# Patient Record
Sex: MALE | Race: WHITE | NOT HISPANIC OR LATINO | Employment: UNEMPLOYED | URBAN - METROPOLITAN AREA
[De-identification: names, ages, dates, MRNs, and addresses within clinical notes are randomized per-mention and may not be internally consistent; named-entity substitution may affect disease eponyms.]

---

## 2017-01-20 ENCOUNTER — ONCOLOGY VISIT (OUTPATIENT)
Dept: TRANSPLANT | Facility: CLINIC | Age: 37
End: 2017-01-20
Attending: INTERNAL MEDICINE

## 2017-01-20 ENCOUNTER — APPOINTMENT (OUTPATIENT)
Dept: LAB | Facility: CLINIC | Age: 37
End: 2017-01-20
Attending: INTERNAL MEDICINE

## 2017-01-20 VITALS
DIASTOLIC BLOOD PRESSURE: 77 MMHG | SYSTOLIC BLOOD PRESSURE: 141 MMHG | OXYGEN SATURATION: 97 % | TEMPERATURE: 98.3 F | HEART RATE: 89 BPM | BODY MASS INDEX: 33.21 KG/M2 | WEIGHT: 212.1 LBS

## 2017-01-20 DIAGNOSIS — N52.9 ERECTILE DYSFUNCTION, UNSPECIFIED ERECTILE DYSFUNCTION TYPE: ICD-10-CM

## 2017-01-20 DIAGNOSIS — R05.9 COUGH: ICD-10-CM

## 2017-01-20 DIAGNOSIS — J43.1 PANLOBULAR EMPHYSEMA (H): ICD-10-CM

## 2017-01-20 DIAGNOSIS — C92.10 CML (CHRONIC MYELOCYTIC LEUKEMIA) (H): ICD-10-CM

## 2017-01-20 DIAGNOSIS — R68.82 DIMINISHED LIBIDO: ICD-10-CM

## 2017-01-20 DIAGNOSIS — D89.813 GRAFT-VERSUS-HOST DISEASE, UNSPECIFIED (H): ICD-10-CM

## 2017-01-20 DIAGNOSIS — F41.9 ANXIETY: ICD-10-CM

## 2017-01-20 DIAGNOSIS — R12 HEARTBURN: ICD-10-CM

## 2017-01-20 DIAGNOSIS — Z94.81 STATUS POST ALLOGENEIC BONE MARROW TRANSPLANT (H): ICD-10-CM

## 2017-01-20 LAB
ALBUMIN SERPL-MCNC: 3.8 G/DL (ref 3.4–5)
ALP SERPL-CCNC: 156 U/L (ref 40–150)
ALT SERPL W P-5'-P-CCNC: 93 U/L (ref 0–70)
ANION GAP SERPL CALCULATED.3IONS-SCNC: 6 MMOL/L (ref 3–14)
AST SERPL W P-5'-P-CCNC: 52 U/L (ref 0–45)
BASOPHILS # BLD AUTO: 0 10E9/L (ref 0–0.2)
BASOPHILS NFR BLD AUTO: 0.5 %
BILIRUB SERPL-MCNC: 0.5 MG/DL (ref 0.2–1.3)
BUN SERPL-MCNC: 12 MG/DL (ref 7–30)
CALCIUM SERPL-MCNC: 8.9 MG/DL (ref 8.5–10.1)
CHLORIDE SERPL-SCNC: 108 MMOL/L (ref 94–109)
CO2 SERPL-SCNC: 25 MMOL/L (ref 20–32)
CREAT SERPL-MCNC: 0.98 MG/DL (ref 0.66–1.25)
DIFFERENTIAL METHOD BLD: NORMAL
EOSINOPHIL # BLD AUTO: 0.1 10E9/L (ref 0–0.7)
EOSINOPHIL NFR BLD AUTO: 1.4 %
ERYTHROCYTE [DISTWIDTH] IN BLOOD BY AUTOMATED COUNT: 12.9 % (ref 10–15)
GFR SERPL CREATININE-BSD FRML MDRD: 86 ML/MIN/1.7M2
GLUCOSE SERPL-MCNC: 109 MG/DL (ref 70–99)
HCT VFR BLD AUTO: 44.5 % (ref 40–53)
HGB BLD-MCNC: 14.9 G/DL (ref 13.3–17.7)
IMM GRANULOCYTES # BLD: 0 10E9/L (ref 0–0.4)
IMM GRANULOCYTES NFR BLD: 0.2 %
LYMPHOCYTES # BLD AUTO: 3.3 10E9/L (ref 0.8–5.3)
LYMPHOCYTES NFR BLD AUTO: 38.2 %
MCH RBC QN AUTO: 30 PG (ref 26.5–33)
MCHC RBC AUTO-ENTMCNC: 33.5 G/DL (ref 31.5–36.5)
MCV RBC AUTO: 90 FL (ref 78–100)
MONOCYTES # BLD AUTO: 0.9 10E9/L (ref 0–1.3)
MONOCYTES NFR BLD AUTO: 10 %
NEUTROPHILS # BLD AUTO: 4.3 10E9/L (ref 1.6–8.3)
NEUTROPHILS NFR BLD AUTO: 49.7 %
NRBC # BLD AUTO: 0 10*3/UL
NRBC BLD AUTO-RTO: 0 /100
PLATELET # BLD AUTO: 188 10E9/L (ref 150–450)
POTASSIUM SERPL-SCNC: 4.3 MMOL/L (ref 3.4–5.3)
PROT SERPL-MCNC: 7.1 G/DL (ref 6.8–8.8)
RBC # BLD AUTO: 4.96 10E12/L (ref 4.4–5.9)
SODIUM SERPL-SCNC: 139 MMOL/L (ref 133–144)
WBC # BLD AUTO: 8.6 10E9/L (ref 4–11)

## 2017-01-20 PROCEDURE — 80053 COMPREHEN METABOLIC PANEL: CPT | Performed by: INTERNAL MEDICINE

## 2017-01-20 PROCEDURE — 36415 COLL VENOUS BLD VENIPUNCTURE: CPT

## 2017-01-20 PROCEDURE — 81206 BCR/ABL1 GENE MAJOR BP: CPT | Performed by: INTERNAL MEDICINE

## 2017-01-20 PROCEDURE — 85025 COMPLETE CBC W/AUTO DIFF WBC: CPT | Performed by: INTERNAL MEDICINE

## 2017-01-20 PROCEDURE — 99212 OFFICE O/P EST SF 10 MIN: CPT | Mod: ZF

## 2017-01-20 NOTE — PROGRESS NOTES
BMT CLINIC PROGRESS NOTE   Caesar Richardson is a 34 year old male with CML status post allo HCT in distant past, and DLI in 6/13/2012.  CC:  Here for scheduled routine f/u.    HPI:  Came with parents but they stayed in waiting area. Doing well, but did not loose weight. Knees had improved with steroid injection but are progressively getting worse again. Unable to afford his medication. Working with  to try to re-establish his disability. Anxiety was well controlled but current legal and financial issues have been taking a toll. His brother and donor is now in a psychiatric institution for treatment or his psychiatric issues. No GI issues. Short of breath on exertion, from COPD and overweight. Still smoking. Unable to use inhalers as he cannot afford. Otherwise, no GI or  issues.   ROS:  10 point ROS negative except as noted above   Physical exam:/77 mmHg  Pulse 89  Temp(Src) 98.3  F (36.8  C) (Oral)  Wt 96.208 kg (212 lb 1.6 oz)  SpO2 97%  Gen:  Pleasant, engages in conversation and answers all questions directly.  A few areas of skin erythema, but no skin plaques of lichenoid changes.  HEENT: OP moist, no upper teeth.  No mucositis or lichenoid changes.  Pulm: lungs with coarse sounds on both bases and wheezing upper fields.   CVS: RRR, no m/r/g   ABD +BS, soft, nontender, obese  EXT: no edema, no rashes, no edema.   SKIN: No rash.  Decorative tattoo on back.new forehead firm lesions.   MSK:  Crepitation on R forearm when moving his thumb.   Neuro/Psych:  Usual affect, conversant, oriented, somewhat anxious     LABS:    Lab Results   Component Value Date    WBC 8.6 01/20/2017    ANEU 4.3 01/20/2017    HGB 14.9 01/20/2017    HCT 44.5 01/20/2017     01/20/2017     01/20/2017    POTASSIUM 4.3 01/20/2017    CHLORIDE 108 01/20/2017    CO2 25 01/20/2017    * 01/20/2017    BUN 12 01/20/2017    CR 0.98 01/20/2017    MAG 2.0 03/12/2014    INR 0.87 03/10/2014   AST       52    1/20/2017  ALT       93   1/20/2017  BILICONJ      0.0   3/10/2014   BILITOTAL      0.5   1/20/2017  ALBUMIN      3.8   1/20/2017  PROTTOTAL      7.1   1/20/2017   ALKPHOS      156   1/20/2017    Bcr-Abl pending    ASSESSMENT AND PLAN:   1. Hematology/Chronic myelogenous leukemia:  Blood counts stable; morphologic remission last Bcr-Abl 3 months ago was undetectable. Today's pending.   - S/P DLI on 6/13/12 for CML relapse. Achieved remission.    2. ID: afebrile.     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 Arthritis maybe GVHD but may do better with local steroid injection to avoid systemic effects, in particular emotional lability. Monitor liver tests each visit.     4. Anxiety:  anxiety partly controlled. Continues to deal with social security and insurance coverage issues. Working on his appeal.    5. FEN:  Still overweight. Reinforced that he needs to loose weight for his breathing, cholesterol, and joint issues.     6. GI: No c/o. Elevated LFTs    7. Pulmonary: shortness of breath on exertion worse since not able to buy medications.  Life-long smoker, and still smoking. Recommended stop smoking again. Encouraged to use inhalers whenever possible.    8. Skin: no rash today. No fibrotic changes.    9. Lipids: Cholesterol mildly elevated but triglycerides were high (400's) last visit. Encouraged to change his diet and minimize sugars and flour (i.e. past). Encouraged to loose weight as above    RTC in  3 months; sooner if needed.  Work on diet to loose weight and exercise.   We would hope he could loose weight 10-15 LBs in next 3 months again.  Stop smoking

## 2017-01-20 NOTE — NURSING NOTE
BMT Heme Malignancy Rooming Note    Caesar Richardson - 1/20/2017 11:30 AM     Chief Complaint   Patient presents with     Blood Draw     vs/labs by cma     RECHECK     Return: CML        /77 mmHg  Pulse 89  Temp(Src) 98.3  F (36.8  C) (Oral)  Wt 96.208 kg (212 lb 1.6 oz)  SpO2 97%     Medications reviewed: Yes    Labs drawn: No    Dressing changed: Not applicable     Medications given: No    Staff time:6    Additional information if applicable: n/a    TOMASZ PADRON CMA

## 2017-01-20 NOTE — NURSING NOTE
Chief Complaint   Patient presents with     Blood Draw     vs/labs by cma   See doc flowsheets for details.  YENNIFER Murrieta,ELISE

## 2017-01-20 NOTE — MR AVS SNAPSHOT
After Visit Summary   1/20/2017    Caesar Richardson    MRN: [id number]           Patient Information     Date Of Birth          1980        Visit Information        Provider Department      1/20/2017 11:30 AM Sebas Loen MD [facility] Blood and Marrow Transplant        Today's Diagnoses     Status post allogeneic bone marrow transplant (H)         CML (chronic myelocytic leukemia) (H)         Cleaf-araedq-nmfb disease, unspecified (H)         Diminished libido         Erectile dysfunction, unspecified erectile dysfunction type         Panlobular emphysema (H)         Anxiety         Cough         Heartburn               [facility] ([facility])  [address] [place] [address]  Phone: [phone]  Clinic Hours:   Monday-Friday:    8am to 4:30pm   Weekends and holidays:    8am to noon (in general)  If your fever is 100.5  or greater,   call the clinic.  After hours call the   hospital at [phone] or   [phone]. Ask for the BMT   fellow for pediatric or adult patients           Care Instructions    4/12/17 @ 10:30a        Follow-ups after your visit        Your next 10 appointments already scheduled     Apr 12, 2017 11:00 AM   Return with Sebas Leon MD   [facility] Blood and Marrow Transplant ([facility])    [address] [place] [id number] [phone]              Future tests that were ordered for you today     Open Standing Orders        Priority Remaining Interval Expires Ordered    Comprehensive metabolic panel Routine 1/1 6/20/2017 1/20/2017    CBC with platelets differential Routine 1/1 6/20/2017 1/20/2017    Lipid panel reflex to direct LDL Routine 1/1 6/20/2017 1/20/2017            Who to contact     If you have questions or need follow up information about today's clinic visit or your schedule please contact [facility] BLOOD AND MARROW TRANSPLANT directly at [phone].  Normal  or non-critical lab and imaging results will be communicated to you by [employer], letter or phone within 4 business days after the clinic has received the results. If you do not hear from us within 7 days, please contact the clinic through [employer] or phone. If you have a critical or abnormal lab result, we will notify you by phone as soon as possible.  Submit refill requests through [employer] or call your pharmacy and they will forward the refill request to us. Please allow 3 business days for your refill to be completed.          Additional Information About Your Visit        [employer] Information     [employer] gives you secure access to your electronic health record. If you see a primary care provider, you can also send messages to your care team and make appointments. If you have questions, please call your primary care clinic.  If you do not have a primary care provider, please call [phone] and they will assist you.        Care EveryWhere ID     This is your Care EveryWhere ID. This could be used by other organizations to access your [place] medical records  [id number]        Your Vitals Were     Pulse Temperature Pulse Oximetry             89 98.3  F (36.8  C) (Oral) 97%          Blood Pressure from Last 3 Encounters:   01/20/17 141/77   06/08/16 111/64   03/09/16 118/72    Weight from Last 3 Encounters:   01/20/17 96.208 kg (212 lb 1.6 oz)   09/14/16 96.163 kg (212 lb)   06/08/16 97.2 kg (214 lb 4.6 oz)              We Performed the Following     BCR ABL1 Major Breakpoint Quant p210     CBC with platelets differential     Comprehensive metabolic panel        Recent Review Flowsheet Data     BMT Recent Results Latest Ref Rng 6/3/2015 9/9/2015 12/9/2015 3/9/2016 6/8/2016 9/14/2016 1/20/2017    WBC 4.0 - 11.0 10e9/L 9.7 7.9 7.4 8.5 9.1 8.8 8.6    Hemoglobin 13.3 - 17.7 g/dL 14.9 13.4 13.2(L) 14.9 14.0 14.5 14.9    Platelet Count 150 - 450 10e9/L 208 229 200 240 219 225 188    Neutrophils (Absolute) 1.6 - 8.3  10e9/L 5.7 4.2 3.9 4.4 4.8 4.7 4.3    Sodium 133 - 144 mmol/L 136 141 143 139 139 140 139    Potassium 3.4 - 5.3 mmol/L 3.9 3.9 3.5 4.4 3.8 4.2 4.3    Chloride 94 - 109 mmol/L 106 109 110(H) 106 109 109 108    Glucose 70 - 99 mg/dL 87 90 76 83 81 88 109(H)    Urea Nitrogen 7 - 30 mg/dL 22 11 14 13 17 15 12    Creatinine 0.66 - 1.25 mg/dL 0.93 0.80 0.93 1.00 1.00 1.13 0.98    Calcium (Total) 8.5 - 10.1 mg/dL 9.0 8.8 8.1(L) 9.0 9.1 9.2 8.9    Protein (Total) 6.8 - 8.8 g/dL 7.5 6.8 6.4(L) 7.5 7.5 7.6 7.1    Albumin 3.4 - 5.0 g/dL 4.0 3.5 3.5 3.8 4.0 4.0 3.8    Alkaline Phosphatase 40 - 150 U/L 196(H) 179(H) 169(H) 174(H) 151(H) 181(H) 156(H)    AST 0 - 45 U/L 97(H) 59(H) 43 82(H) 49(H) 55(H) 52(H)    ALT 0 - 70 U/L 146(H) 129(H) 90(H) 178(H) 94(H) 93(H) 93(H)    MCV 78 - 100 fl 88 89 89 89 89 89 90               Primary Care Provider    None Specified       No primary provider on file.        Thank you!     Thank you for choosing [facility] BLOOD AND MARROW TRANSPLANT  for your care. Our goal is always to provide you with excellent care. Hearing back from our patients is one way we can continue to improve our services. Please take a few minutes to complete the written survey that you may receive in the mail after your visit with us. Thank you!             Your Updated Medication List - Protect others around you: Learn how to safely use, store and throw away your medicines at www.disposemymeds.org.          This list is accurate as of: 1/20/17 12:02 PM.  Always use your most recent med list.                   Brand Name Dispense Instructions for use    diclofenac 1 % Gel topical gel    VOLTAREN    2 Tube    Apply topically 4 times daily       fluticasone-salmeterol 250-50 MCG/DOSE diskus inhaler    ADVAIR    1 Inhaler    Inhale 1 puff into the lungs 2 times daily       LORazepam 1 MG tablet    ATIVAN    60 tablet    Take 1 tablet (1 mg) by mouth every 4 hours as needed for other (nausea, vomiting, anxiety)        omeprazole 40 MG capsule    priLOSEC    30 capsule    Take 1 capsule (40 mg) by mouth daily       tiotropium 18 MCG capsule    SPIRIVA HANDIHALER    30 capsule    Inhale contents of one capsule daily.       traZODone 150 MG tablet    DESYREL    90 tablet    Take 1 tablet (150 mg) by mouth At Bedtime

## 2017-01-23 LAB — COPATH REPORT: NORMAL

## 2017-04-12 ENCOUNTER — APPOINTMENT (OUTPATIENT)
Dept: LAB | Facility: CLINIC | Age: 37
End: 2017-04-12
Attending: INTERNAL MEDICINE

## 2017-04-12 ENCOUNTER — ONCOLOGY VISIT (OUTPATIENT)
Dept: TRANSPLANT | Facility: CLINIC | Age: 37
End: 2017-04-12
Attending: INTERNAL MEDICINE

## 2017-04-12 ENCOUNTER — TELEPHONE (OUTPATIENT)
Dept: TRANSPLANT | Facility: CLINIC | Age: 37
End: 2017-04-12

## 2017-04-12 VITALS
DIASTOLIC BLOOD PRESSURE: 78 MMHG | BODY MASS INDEX: 33.66 KG/M2 | WEIGHT: 214.9 LBS | RESPIRATION RATE: 20 BRPM | TEMPERATURE: 98.7 F | HEART RATE: 94 BPM | SYSTOLIC BLOOD PRESSURE: 130 MMHG

## 2017-04-12 DIAGNOSIS — J43.1 PANLOBULAR EMPHYSEMA (H): ICD-10-CM

## 2017-04-12 DIAGNOSIS — R05.9 COUGH: ICD-10-CM

## 2017-04-12 DIAGNOSIS — R68.82 DIMINISHED LIBIDO: ICD-10-CM

## 2017-04-12 DIAGNOSIS — D89.813 GRAFT-VERSUS-HOST DISEASE, UNSPECIFIED (H): ICD-10-CM

## 2017-04-12 DIAGNOSIS — R12 HEARTBURN: ICD-10-CM

## 2017-04-12 DIAGNOSIS — F41.9 ANXIETY: ICD-10-CM

## 2017-04-12 DIAGNOSIS — N52.9 ERECTILE DYSFUNCTION, UNSPECIFIED ERECTILE DYSFUNCTION TYPE: ICD-10-CM

## 2017-04-12 DIAGNOSIS — C92.10 CML (CHRONIC MYELOCYTIC LEUKEMIA) (H): ICD-10-CM

## 2017-04-12 DIAGNOSIS — Z94.81 STATUS POST ALLOGENEIC BONE MARROW TRANSPLANT (H): ICD-10-CM

## 2017-04-12 LAB
ALBUMIN SERPL-MCNC: 3.9 G/DL (ref 3.4–5)
ALP SERPL-CCNC: 140 U/L (ref 40–150)
ALT SERPL W P-5'-P-CCNC: 104 U/L (ref 0–70)
ANION GAP SERPL CALCULATED.3IONS-SCNC: 6 MMOL/L (ref 3–14)
AST SERPL W P-5'-P-CCNC: 56 U/L (ref 0–45)
BASOPHILS # BLD AUTO: 0 10E9/L (ref 0–0.2)
BASOPHILS NFR BLD AUTO: 0.5 %
BILIRUB SERPL-MCNC: 0.5 MG/DL (ref 0.2–1.3)
BUN SERPL-MCNC: 17 MG/DL (ref 7–30)
CALCIUM SERPL-MCNC: 8.8 MG/DL (ref 8.5–10.1)
CHLORIDE SERPL-SCNC: 108 MMOL/L (ref 94–109)
CHOLEST SERPL-MCNC: 210 MG/DL
CO2 SERPL-SCNC: 24 MMOL/L (ref 20–32)
CREAT SERPL-MCNC: 1.13 MG/DL (ref 0.66–1.25)
DIFFERENTIAL METHOD BLD: NORMAL
EOSINOPHIL # BLD AUTO: 0.1 10E9/L (ref 0–0.7)
EOSINOPHIL NFR BLD AUTO: 1.6 %
ERYTHROCYTE [DISTWIDTH] IN BLOOD BY AUTOMATED COUNT: 13.5 % (ref 10–15)
GFR SERPL CREATININE-BSD FRML MDRD: 73 ML/MIN/1.7M2
GLUCOSE SERPL-MCNC: 82 MG/DL (ref 70–99)
HCT VFR BLD AUTO: 43.2 % (ref 40–53)
HDLC SERPL-MCNC: 35 MG/DL
HGB BLD-MCNC: 14.2 G/DL (ref 13.3–17.7)
IMM GRANULOCYTES # BLD: 0 10E9/L (ref 0–0.4)
IMM GRANULOCYTES NFR BLD: 0.5 %
LDLC SERPL CALC-MCNC: 146 MG/DL
LYMPHOCYTES # BLD AUTO: 3.4 10E9/L (ref 0.8–5.3)
LYMPHOCYTES NFR BLD AUTO: 38.9 %
MCH RBC QN AUTO: 29.5 PG (ref 26.5–33)
MCHC RBC AUTO-ENTMCNC: 32.9 G/DL (ref 31.5–36.5)
MCV RBC AUTO: 90 FL (ref 78–100)
MONOCYTES # BLD AUTO: 0.9 10E9/L (ref 0–1.3)
MONOCYTES NFR BLD AUTO: 10.6 %
NEUTROPHILS # BLD AUTO: 4.2 10E9/L (ref 1.6–8.3)
NEUTROPHILS NFR BLD AUTO: 47.9 %
NONHDLC SERPL-MCNC: 175 MG/DL
NRBC # BLD AUTO: 0 10*3/UL
NRBC BLD AUTO-RTO: 0 /100
PLATELET # BLD AUTO: 209 10E9/L (ref 150–450)
POTASSIUM SERPL-SCNC: 4.3 MMOL/L (ref 3.4–5.3)
PROT SERPL-MCNC: 7.2 G/DL (ref 6.8–8.8)
RBC # BLD AUTO: 4.82 10E12/L (ref 4.4–5.9)
SODIUM SERPL-SCNC: 138 MMOL/L (ref 133–144)
TRIGL SERPL-MCNC: 144 MG/DL
WBC # BLD AUTO: 8.7 10E9/L (ref 4–11)

## 2017-04-12 PROCEDURE — 36415 COLL VENOUS BLD VENIPUNCTURE: CPT

## 2017-04-12 PROCEDURE — 85025 COMPLETE CBC W/AUTO DIFF WBC: CPT | Performed by: INTERNAL MEDICINE

## 2017-04-12 PROCEDURE — 80061 LIPID PANEL: CPT | Performed by: INTERNAL MEDICINE

## 2017-04-12 PROCEDURE — 99212 OFFICE O/P EST SF 10 MIN: CPT | Mod: ZF

## 2017-04-12 PROCEDURE — 80053 COMPREHEN METABOLIC PANEL: CPT | Performed by: INTERNAL MEDICINE

## 2017-04-12 NOTE — PATIENT INSTRUCTIONS
R/NICOLETTE Leon on 7/26/17 with [employer] [facility]

## 2017-04-12 NOTE — TELEPHONE ENCOUNTER
Clinical   Blood and Marrow Transplant Service    Received request from provider to speak with patient about his insurance situation - he currently has no health insurance and has not taken prescribed medications for 1-2 months. LOU also has been screened by [employer] as being functionally able to work and has a hearing in June re: this. He also needs to have an appointment with a GI MD about his liver functions and will require insurance for this appointment. Dr. Leon would like assistance with getting coverage for 3 medications - Cleocin Cream, Advair, and Spiriva. Will investigate these options.    Phone call to number listed for his cell - this is his Aunt's home in [place] (where he lives) and she indicated writer should call the number listed for his father. When LOU is in [place] for MD appointments he stays with his dad. Phone call to father's number and message left.  LOU does not have a cell phone - made this change in Demographics.    Will await return call from patient.     Will continue to follow for supportive counseling and assist with resources.     Neena GREY LICSW  4/12/2017

## 2017-04-12 NOTE — MR AVS SNAPSHOT
After Visit Summary   4/12/2017    Caesar Richardosn    MRN: [id number]           Patient Information     Date Of Birth          1980        Visit Information        Provider Department      4/12/2017 11:00 AM Sebas Leon MD [facility] Blood and Marrow Transplant        Today's Diagnoses     Status post allogeneic bone marrow transplant (H)        CML (chronic myelocytic leukemia) (H)        Axqsq-fshgha-xtgr disease, unspecified (H)              [facility] ([facility])  [address] [place] [address]  Phone: [phone]  Clinic Hours:   Monday-Friday:    8am to 4:30pm   Weekends and holidays:    8am to noon (in general)  If your fever is 100.5  or greater,   call the clinic.  After hours call the   hospital at [phone] or   [phone]. Ask for the BMT   fellow for pediatric or adult patients           Care Instructions    R/NICOLETTE Leon on 7/26/17 with labs @ 11a          Follow-ups after your visit        Future tests that were ordered for you today     Open Future Orders        Priority Expected Expires Ordered    CBC with platelets differential Routine 7/26/2017 7/26/2017 4/12/2017    Comprehensive metabolic panel Routine 7/26/2017 7/26/2017 4/12/2017    Lipid panel reflex to direct LDL Routine 7/26/2017 7/26/2017 4/12/2017    BCR ABL1 Major Breakpoint Quant p210 Routine 7/26/2017 7/26/2017 4/12/2017            Who to contact     If you have questions or need follow up information about today's clinic visit or your schedule please contact [facility] BLOOD AND MARROW TRANSPLANT directly at [phone].  Normal or non-critical lab and imaging results will be communicated to you by MyChart, letter or phone within 4 business days after the clinic has received the results. If you do not hear from us within 7 days, please contact the clinic through MyChart or phone. If you have a critical or abnormal lab result, we will notify you by phone as soon as  possible.  Submit refill requests through [employer] or call your pharmacy and they will forward the refill request to us. Please allow 3 business days for your refill to be completed.          Additional Information About Your Visit        [employer] Information     [employer] gives you secure access to your electronic health record. If you see a primary care provider, you can also send messages to your care team and make appointments. If you have questions, please call your primary care clinic.  If you do not have a primary care provider, please call [phone] and they will assist you.        Care EveryWhere ID     This is your Care EveryWhere ID. This could be used by other organizations to access your [place] medical records  [id number]        Your Vitals Were     Pulse Temperature Respirations BMI (Body Mass Index)          94 98.7  F (37.1  C) (Oral) 20 33.66 kg/m2         Blood Pressure from Last 3 Encounters:   04/12/17 130/78   01/20/17 141/77   06/08/16 111/64    Weight from Last 3 Encounters:   04/12/17 97.5 kg (214 lb 14.4 oz)   01/20/17 96.2 kg (212 lb 1.6 oz)   09/14/16 96.2 kg (212 lb)              We Performed the Following     CBC with platelets differential     Comprehensive metabolic panel     Lipid panel reflex to direct LDL        Recent Review Flowsheet Data     BMT Recent Results Latest Ref Rng & Units 9/9/2015 12/9/2015 3/9/2016 6/8/2016 9/14/2016 1/20/2017 4/12/2017    WBC 4.0 - 11.0 10e9/L 7.9 7.4 8.5 9.1 8.8 8.6 8.7    Hemoglobin 13.3 - 17.7 g/dL 13.4 13.2(L) 14.9 14.0 14.5 14.9 14.2    Platelet Count 150 - 450 10e9/L 229 200 240 219 225 188 209    Neutrophils (Absolute) 1.6 - 8.3 10e9/L 4.2 3.9 4.4 4.8 4.7 4.3 4.2    Sodium 133 - 144 mmol/L 141 143 139 139 140 139 -    Potassium 3.4 - 5.3 mmol/L 3.9 3.5 4.4 3.8 4.2 4.3 -    Chloride 94 - 109 mmol/L 109 110(H) 106 109 109 108 -    Glucose 70 - 99 mg/dL 90 76 83 81 88 109(H) -    Urea Nitrogen 7 - 30 mg/dL 11 14 13 17 15 12 -    Creatinine  0.66 - 1.25 mg/dL 0.80 0.93 1.00 1.00 1.13 0.98 -    Calcium (Total) 8.5 - 10.1 mg/dL 8.8 8.1(L) 9.0 9.1 9.2 8.9 -    Protein (Total) 6.8 - 8.8 g/dL 6.8 6.4(L) 7.5 7.5 7.6 7.1 -    Albumin 3.4 - 5.0 g/dL 3.5 3.5 3.8 4.0 4.0 3.8 -    Alkaline Phosphatase 40 - 150 U/L 179(H) 169(H) 174(H) 151(H) 181(H) 156(H) -    AST 0 - 45 U/L 59(H) 43 82(H) 49(H) 55(H) 52(H) -    ALT 0 - 70 U/L 129(H) 90(H) 178(H) 94(H) 93(H) 93(H) -    MCV 78 - 100 fl 89 89 89 89 89 90 90               Primary Care Provider    None Specified       No primary provider on file.        Thank you!     Thank you for choosing [facility] BLOOD AND MARROW TRANSPLANT  for your care. Our goal is always to provide you with excellent care. Hearing back from our patients is one way we can continue to improve our services. Please take a few minutes to complete the written survey that you may receive in the mail after your visit with us. Thank you!             Your Updated Medication List - Protect others around you: Learn how to safely use, store and throw away your medicines at www.disposemymeds.org.          This list is accurate as of: 4/12/17 11:21 AM.  Always use your most recent med list.                   Brand Name Dispense Instructions for use    diclofenac 1 % Gel topical gel    VOLTAREN    2 Tube    Apply topically 4 times daily       fluticasone-salmeterol 250-50 MCG/DOSE diskus inhaler    ADVAIR    1 Inhaler    Inhale 1 puff into the lungs 2 times daily       LORazepam 1 MG tablet    ATIVAN    60 tablet    Take 1 tablet (1 mg) by mouth every 4 hours as needed for other (nausea, vomiting, anxiety)       omeprazole 40 MG capsule    priLOSEC    30 capsule    Take 1 capsule (40 mg) by mouth daily       tiotropium 18 MCG capsule    SPIRIVA HANDIHALER    30 capsule    Inhale contents of one capsule daily.       traZODone 150 MG tablet    DESYREL    90 tablet    Take 1 tablet (150 mg) by mouth At Bedtime

## 2017-04-12 NOTE — NURSING NOTE
Chief Complaint   Patient presents with     Blood Draw     Pt with CML--here for labs and to see MD Lucie Van, MA

## 2017-04-12 NOTE — PROGRESS NOTES
BMT CLINIC PROGRESS NOTE   Caesar Richardson is a 34 year old male with CML status post allo HCT in distant past, and DLI in 6/13/2012.  CC:  Here for scheduled routine f/u.    HPI:  Came with parents. Very depressed with his psycho-social condition. No suicidal ideas or plan. Knees hurt. Still smokes some. Not taking any meds for over 4 weeks as he has no money. Relatives provide food where he lives. Knees had improved with steroid injection but are progressively getting worse again and he has no money to go back to Ortho. Working with  to try to re-establish his disability but unlikely to succeed. No new GI issues. Acneiform rash on his forehead. Short of breath on exertion, from COPD and overweight. Still smoking. Unable to use inhalers as he cannot afford. Otherwise, no GI or  issues.   ROS:  10 point ROS negative except as noted above   Physical exam:/78 (BP Location: Left arm, Patient Position: Left side, Cuff Size: Adult Regular)  Pulse 94  Temp 98.7  F (37.1  C) (Oral)  Resp 20  Wt 97.5 kg (214 lb 14.4 oz)  BMI 33.66 kg/m2  Gen:  A few areas of skin erythema, but no skin plaques of lichenoid changes. Has acneiform rash on his forehead. Spiders angiomas on his upper chest and back.   HEENT: OP moist, no upper teeth.  No mucositis or lichenoid changes.  Pulm: lungs with coarse sounds on both bases and wheezing upper fields.   CVS: RRR, no m/r/g   ABD +BS, soft, nontender, obese  EXT: no edema, no rashes, no edema.   SKIN: No rash.  Decorative tattoo on back.new forehead firm lesions.   MSK:  Crepitation on R forearm when moving his thumb.   Neuro/Psych:  Depressed affect, conversant, oriented, somewhat anxious     LABS:    Lab Results   Component Value Date    WBC 8.7 04/12/2017    ANEU 4.2 04/12/2017    HGB 14.2 04/12/2017    HCT 43.2 04/12/2017     04/12/2017     04/12/2017    POTASSIUM 4.3 04/12/2017    CHLORIDE 108 04/12/2017    CO2 24 04/12/2017    GLC 82 04/12/2017    BUN  17 04/12/2017    CR 1.13 04/12/2017    MAG 2.0 03/12/2014    INR 0.87 03/10/2014     Lab Results   Component Value Date    AST 56 04/12/2017     Lab Results   Component Value Date     04/12/2017     Lab Results   Component Value Date    BILICONJ 0.0 03/10/2014      Lab Results   Component Value Date    BILITOTAL 0.5 04/12/2017     Lab Results   Component Value Date    ALBUMIN 3.9 04/12/2017     Lab Results   Component Value Date    PROTTOTAL 7.2 04/12/2017      Lab Results   Component Value Date    ALKPHOS 140 04/12/2017       Bcr-Abl next visit    ASSESSMENT AND PLAN:   1. Hematology/Chronic myelogenous leukemia:  Blood counts stable; morphologic remission last Bcr-Abl 3 months ago was undetectable. Repeat in the summer  - S/P DLI on 6/13/12 for CML relapse. Achieved remission.    2. ID: afebrile.     3. 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 Spider angiomas could be liver disease but not enough enzyme change to support. Arthritis maybe GVHD but may do better with local steroid injection to avoid systemic effects, in particular emotional lability.     4. Anxiety/depression:  Worse depression and anxiety due to ongoing psycho-social issues. Continues to deal with social security and insurance coverage issues. Working on his appeal.     5. FEN:  Still overweight. Reinforced that he needs to loose weight for his breathing, cholesterol, and joint issues.     6. GI: No c/o. Mildly elevated LFTs    7. Pulmonary: shortness of breath on exertion worse since not able to buy medications.  Life-long smoker, and still smoking. Recommended stop smoking again. Encouraged to use inhalers whenever possible.    8. Skin: two new rashes today. Unable to afford creon for face rash. No fibrotic changes on huis skin. Spider angiomas on his chest and back (?)    9. Lipids: Cholesterol mildly elevated and triglycerides are now down. Encouraged to change his diet and  minimize sugars and flour (i.e. past). Encouraged to loose weight as above    RTC in  3-4 months; sooner if needed.  Work on diet to loose weight and exercise.   We would hope he could loose weight 10-15 LBs until next visit  Stop smoking

## 2017-04-12 NOTE — NURSING NOTE
Chief Complaint   Patient presents with     Blood Draw     Pt with CML--here for labs and to see MD     RECHECK     Post BMT for CML here for labs and provider       Nisha Cano,CMA April 12, 2017 10:56 AM  Medications and allergies reviewed. Face to face time 5 minutes.

## 2017-04-14 RX ORDER — TIOTROPIUM BROMIDE 18 UG/1
CAPSULE ORAL; RESPIRATORY (INHALATION)
Qty: 30 CAPSULE | Refills: 12 | Status: SHIPPED | OUTPATIENT
Start: 2017-04-14 | End: 2021-02-19

## 2017-04-14 RX ORDER — CLINDAMYCIN PHOSPHATE 10 UG/ML
LOTION TOPICAL 2 TIMES DAILY
Qty: 60 ML | Refills: 12 | Status: SHIPPED | OUTPATIENT
Start: 2017-04-14 | End: 2017-10-25

## 2017-06-13 ENCOUNTER — OFFICE VISIT (OUTPATIENT)
Dept: TRANSPLANT | Facility: CLINIC | Age: 37
End: 2017-06-13

## 2017-06-13 ENCOUNTER — DOCUMENTATION ONLY (OUTPATIENT)
Dept: TRANSPLANT | Facility: CLINIC | Age: 37
End: 2017-06-13

## 2017-06-13 DIAGNOSIS — Z94.81 STATUS POST ALLOGENEIC BONE MARROW TRANSPLANT (H): ICD-10-CM

## 2017-06-13 DIAGNOSIS — C92.10 CML (CHRONIC MYELOCYTIC LEUKEMIA) (H): Primary | ICD-10-CM

## 2017-06-13 DIAGNOSIS — D89.813 GRAFT-VERSUS-HOST DISEASE, UNSPECIFIED (H): ICD-10-CM

## 2017-06-13 NOTE — MR AVS SNAPSHOT
After Visit Summary   6/13/2017    Caesar Richardson    MRN: [id number]           Patient Information     Date Of Birth          1980        Visit Information        Provider Department      6/13/2017 10:43 AM Sebas Leon MD Blood and Marrow Transplant        Today's Diagnoses     CML (chronic myelocytic leukemia) (H)    -  1    Status post allogeneic bone marrow transplant (H)        Hteaz-zjovda-teox disease, unspecified (H)              [facility] ([facility] [place])  [address] [place] [id number]  Phone: [phone]  Clinic Hours:   Monday-Thursday:7am to 7pm   Friday: 7am to 5pm   Weekends and holidays:    8am to noon (in general)  If your fever is 100.5  or greater,   call the clinic.  After hours call the   hospital at [phone] or   [phone]. Ask for the BMT   fellow on-call            Follow-ups after your visit        Your next 10 appointments already scheduled     Jul 26, 2017  2:00 PM CDT   [facility] Draw with  [facility]   [facility] Draw ([facility])    [address] [place] [id number] [phone]            Jul 26, 2017  2:30 PM CDT   Return with Sebas Leon MD   [facility] Blood and Marrow Transplant ([facility])    [address] [place] [id number] [phone]              Who to contact     Please call your clinic at [phone] to:    Ask questions about your health    Make or cancel appointments    Discuss your medicines    Learn about your test results    Speak to your doctor   If you have compliments or concerns about an experience at your clinic, or if you wish to file a complaint, please contact [facility] Patient Relations at [phone] or email us at [email or web address]         Additional Information About Your Visit        MyChart Information     MyChart  gives you secure access to your electronic health record. If you see a primary care provider, you can also send messages to your care team and make appointments. If you have questions, please call your primary care clinic.  If you do not have a primary care provider, please call [phone] and they will assist you.      [employer] is an electronic gateway that provides easy, online access to your medical records. With [employer], you can request a clinic appointment, read your test results, renew a prescription or communicate with your care team.     To access your existing account, please contact your [facility] or call [phone] for assistance.        Care EveryWhere ID     This is your Care EveryWhere ID. This could be used by other organizations to access your [facility] medical records  [id number]         Blood Pressure from Last 3 Encounters:   04/12/17 130/78   01/20/17 141/77   06/08/16 111/64    Weight from Last 3 Encounters:   04/12/17 97.5 kg (214 lb 14.4 oz)   01/20/17 96.2 kg (212 lb 1.6 oz)   09/14/16 96.2 kg (212 lb)              Today, you had the following     No orders found for display       Recent Review Flowsheet Data     BMT Recent Results Latest Ref Rng & Units 9/9/2015 12/9/2015 3/9/2016 6/8/2016 9/14/2016 1/20/2017 4/12/2017    WBC 4.0 - 11.0 10e9/L 7.9 7.4 8.5 9.1 8.8 8.6 8.7    Hemoglobin 13.3 - 17.7 g/dL 13.4 13.2(L) 14.9 14.0 14.5 14.9 14.2    Platelet Count 150 - 450 10e9/L 229 200 240 219 225 188 209    Neutrophils (Absolute) 1.6 - 8.3 10e9/L 4.2 3.9 4.4 4.8 4.7 4.3 4.2    Sodium 133 - 144 mmol/L 141 143 139 139 140 139 138    Potassium 3.4 - 5.3 mmol/L 3.9 3.5 4.4 3.8 4.2 4.3 4.3    Chloride 94 - 109 mmol/L 109 110(H) 106 109 109 108 108    Glucose 70 - 99 mg/dL 90 76 83 81 88 109(H) 82    Urea Nitrogen 7 - 30 mg/dL 11 14 13 17 15 12 17    Creatinine 0.66 - 1.25 mg/dL 0.80 0.93 1.00 1.00 1.13 0.98 1.13    Calcium (Total) 8.5 - 10.1 mg/dL 8.8 8.1(L) 9.0  9.1 9.2 8.9 8.8    Protein (Total) 6.8 - 8.8 g/dL 6.8 6.4(L) 7.5 7.5 7.6 7.1 7.2    Albumin 3.4 - 5.0 g/dL 3.5 3.5 3.8 4.0 4.0 3.8 3.9    Alkaline Phosphatase 40 - 150 U/L 179(H) 169(H) 174(H) 151(H) 181(H) 156(H) 140    AST 0 - 45 U/L 59(H) 43 82(H) 49(H) 55(H) 52(H) 56(H)    ALT 0 - 70 U/L 129(H) 90(H) 178(H) 94(H) 93(H) 93(H) 104(H)    MCV 78 - 100 fl 89 89 89 89 89 90 90               Primary Care Provider    None Specified       No primary provider on file.        Thank you!     Thank you for choosing BLOOD AND MARROW TRANSPLANT  for your care. Our goal is always to provide you with excellent care. Hearing back from our patients is one way we can continue to improve our services. Please take a few minutes to complete the written survey that you may receive in the mail after your visit with us. Thank you!             Your Updated Medication List - Protect others around you: Learn how to safely use, store and throw away your medicines at www.disposemymeds.org.          This list is accurate as of: 6/13/17 11:59 PM.  Always use your most recent med list.                   Brand Name Dispense Instructions for use    clindamycin 1 % lotion    CLEOCIN-T    60 mL    Apply topically 2 times daily To forehead and affected areas       diclofenac 1 % Gel topical gel    VOLTAREN    2 Tube    Apply topically 4 times daily       fluticasone-salmeterol 250-50 MCG/DOSE diskus inhaler    ADVAIR    1 Inhaler    Inhale 1 puff into the lungs 2 times daily       LORazepam 1 MG tablet    ATIVAN    60 tablet    Take 1 tablet (1 mg) by mouth every 4 hours as needed for other (nausea, vomiting, anxiety)       omeprazole 40 MG capsule    priLOSEC    30 capsule    Take 1 capsule (40 mg) by mouth daily       tiotropium 18 MCG capsule    SPIRIVA HANDIHALER    30 capsule    Inhale contents of one capsule daily.       traZODone 150 MG tablet    DESYREL    90 tablet    Take 1 tablet (150 mg) by mouth At Bedtime

## 2017-06-13 NOTE — PROGRESS NOTES
June 13, 2017            RE: Caesar Richardson       To Whom It May Concern:      This is a letter on patient, Caesar Richardson, that was requested by the patient through his  for his Social Security process.  There are 8 questions that I am asked to address.   1.  I have known Mr. Richardson since his original bone marrow transplant in 2006.  At that time, the patient had a myeloablative allogeneic peripheral blood stem cell transplant for his disease.  As complications of that allogeneic transplant, the patient had infections in his blood stream, intestines, and also his lungs.  He also developed chronic rejxt-kafgru-eqpe disease.  Eventually after several months, the patient slowly recovered from all this, and was able to taper off most of his transplant-related medications.  The patient did develop some late effects related to his transplant.  In particular, he had some persistent bone pains and degenerative joint changes in his knees and other joints.  The patient had significant psychosocial issues throughout the process; some of them related to anxiety and depression, and sometimes showing a somewhat aggressive behavior.  However, Mr. Richardson was always respectful towards the medical and support teams.  In 2012, the patient had a relapse of his CML.  At that time he was started on therapy, and was then given donor lymphocyte infusions that cleared his leukemia.  He has remained in remission since.  Despite being in remission, Mr. Richardson has some well-described late effects of allogeneic transplantation, which include joint pains, degenerative bone changes, lung injury with emphysema (this has been contributed by the patient's tobacco use).  The patient continues to have significant psychosocial issues with anxiety.  In particular, during the periods that he require treatment for his rhrhl-frxtom-pqnj disease with steroids, the patient was very irritable.  He also needed medications to calm him down throughout  the process because of the emotional side effects of prednisone.  For a long period of time, the patient required powerful pain medications for the control of his pain, but he was eventually able and very driven to taper off those medications, and today has been using anti-inflammatory and local medications.  The best moment for his joint issues were after some injections that he received in the Sports Medicine Clinic here at [facility].   2.  The patient's medical condition and course of treatment is summarized in the above answer.  The patient's prognosis from a leukemia perspective is good.  Despite his previous relapse, he may continue to be in remission long-term.  He is, of course, dealing with late effects from his allogeneic transplantation. As I previously stated, I am most concerned about his psycho-social condition.  He has been unable to have continued medical coverage, and that has impacted on his ability to afford and take his medications and follow up with, Sports Medicine/ Orthopedics and Psychologist or Psychiatrist.    3.  Mr. Richardson has been largely with the treatments prescribed.  His most recent issues have been related to a lack of insurance coverage in which he is unable to afford his medications; therefore, not able to take them.  Otherwise, he has been always honest with the team.     4.  Regarding the concern as to whether or not the patient is able to work an 8-hour day 5 times a day, it is hard to describe.  It really would depend on what kind of work, and what would be asked of Mr. Richardson.  He clearly has a limited ability for physical work because of his joint problems.  The best assessment of that would actually have to be performed by an appropriate physical therapy or sports medicine professional.  In that type of assessment, he would be able to assess endurance, as well as strength for the patient to better determine his overall physical condition.     5.  Regarding  whether or not the patient would need to take frequent unscheduled breaks of 10-15 minutes per day to relieve his pain and fatigue, I would say that the appropriate evaluation would have to be performed by a physical therapy or sports medicine specialist.  I have been seeing the patient every 4-6 months in our clinic for health maintenance issues related to pain, his breathing issues ands monitoring his leukemia for evidence of relapse.  It is possible, and, depending on the type of work, that because of his knee and joint pains he may require some breaks.  However,  I do not feel that I am the most qualified person to provide quantitation of those needs.   6.  Mr. Richardson is clearly likely to have some bad days with worse joint pain and respiratory problems.  Otherwise, it is not possible to estimate how many days the patient would be absent from work per month.   7.  As pointed out in your question, the complaint of pain is subjective.  I am unaware of objective testing to determine presence or intensity of pain.  Based on imaging and my knowledge of the patient, I do believe that he has chronic pain.  He did have moments of subjective improvement when he was able to take the prescribed treatment and had injections that helped him temporarily for his knees.    8.  I think if the Social Security Administration reviews the extensive medical records on this patient, they will find them to be fairly consistent with all the above.  His medical records will have documentation from multiple other health care professionals, social workers and psychiatrists, that will corroborate the Mr. Richardson's clinical course.  While he is more stable now, his psychosocial situation continues to be very frail, and he has clear difficulties dealing with stress.      Please feel free to contact me with any additional questions at the [facility].

## 2017-07-26 ENCOUNTER — APPOINTMENT (OUTPATIENT)
Dept: LAB | Facility: CLINIC | Age: 37
End: 2017-07-26
Attending: INTERNAL MEDICINE
Payer: MEDICARE

## 2017-07-26 ENCOUNTER — ONCOLOGY VISIT (OUTPATIENT)
Dept: TRANSPLANT | Facility: CLINIC | Age: 37
End: 2017-07-26
Attending: INTERNAL MEDICINE
Payer: MEDICARE

## 2017-07-26 VITALS
BODY MASS INDEX: 32.81 KG/M2 | SYSTOLIC BLOOD PRESSURE: 140 MMHG | RESPIRATION RATE: 16 BRPM | DIASTOLIC BLOOD PRESSURE: 88 MMHG | HEART RATE: 96 BPM | OXYGEN SATURATION: 99 % | WEIGHT: 209.5 LBS | TEMPERATURE: 98.4 F

## 2017-07-26 DIAGNOSIS — Z94.81 STATUS POST ALLOGENEIC BONE MARROW TRANSPLANT (H): ICD-10-CM

## 2017-07-26 DIAGNOSIS — D89.813 GRAFT-VERSUS-HOST DISEASE, UNSPECIFIED (H): ICD-10-CM

## 2017-07-26 DIAGNOSIS — C92.10 CML (CHRONIC MYELOCYTIC LEUKEMIA) (H): ICD-10-CM

## 2017-07-26 LAB
ALBUMIN SERPL-MCNC: 3.8 G/DL (ref 3.4–5)
ALP SERPL-CCNC: 170 U/L (ref 40–150)
ALT SERPL W P-5'-P-CCNC: 114 U/L (ref 0–70)
ANION GAP SERPL CALCULATED.3IONS-SCNC: 6 MMOL/L (ref 3–14)
AST SERPL W P-5'-P-CCNC: 58 U/L (ref 0–45)
BASOPHILS # BLD AUTO: 0 10E9/L (ref 0–0.2)
BASOPHILS NFR BLD AUTO: 0.3 %
BILIRUB SERPL-MCNC: 0.5 MG/DL (ref 0.2–1.3)
BUN SERPL-MCNC: 16 MG/DL (ref 7–30)
CALCIUM SERPL-MCNC: 9.4 MG/DL (ref 8.5–10.1)
CHLORIDE SERPL-SCNC: 107 MMOL/L (ref 94–109)
CHOLEST SERPL-MCNC: 220 MG/DL
CO2 SERPL-SCNC: 26 MMOL/L (ref 20–32)
CREAT SERPL-MCNC: 1.24 MG/DL (ref 0.66–1.25)
DIFFERENTIAL METHOD BLD: NORMAL
EOSINOPHIL # BLD AUTO: 0.1 10E9/L (ref 0–0.7)
EOSINOPHIL NFR BLD AUTO: 1.2 %
ERYTHROCYTE [DISTWIDTH] IN BLOOD BY AUTOMATED COUNT: 13.3 % (ref 10–15)
GFR SERPL CREATININE-BSD FRML MDRD: 66 ML/MIN/1.7M2
GLUCOSE SERPL-MCNC: 83 MG/DL (ref 70–99)
HCT VFR BLD AUTO: 45.6 % (ref 40–53)
HDLC SERPL-MCNC: 28 MG/DL
HGB BLD-MCNC: 14.9 G/DL (ref 13.3–17.7)
IMM GRANULOCYTES # BLD: 0 10E9/L (ref 0–0.4)
IMM GRANULOCYTES NFR BLD: 0.3 %
LDLC SERPL CALC-MCNC: ABNORMAL MG/DL
LDLC SERPL DIRECT ASSAY-MCNC: 154 MG/DL
LYMPHOCYTES # BLD AUTO: 3.1 10E9/L (ref 0.8–5.3)
LYMPHOCYTES NFR BLD AUTO: 33.7 %
MCH RBC QN AUTO: 29.8 PG (ref 26.5–33)
MCHC RBC AUTO-ENTMCNC: 32.7 G/DL (ref 31.5–36.5)
MCV RBC AUTO: 91 FL (ref 78–100)
MONOCYTES # BLD AUTO: 0.8 10E9/L (ref 0–1.3)
MONOCYTES NFR BLD AUTO: 8.7 %
NEUTROPHILS # BLD AUTO: 5.1 10E9/L (ref 1.6–8.3)
NEUTROPHILS NFR BLD AUTO: 55.8 %
NONHDLC SERPL-MCNC: 193 MG/DL
NRBC # BLD AUTO: 0 10*3/UL
NRBC BLD AUTO-RTO: 0 /100
PLATELET # BLD AUTO: 218 10E9/L (ref 150–450)
POTASSIUM SERPL-SCNC: 4.1 MMOL/L (ref 3.4–5.3)
PROT SERPL-MCNC: 7.5 G/DL (ref 6.8–8.8)
RBC # BLD AUTO: 5 10E12/L (ref 4.4–5.9)
SODIUM SERPL-SCNC: 139 MMOL/L (ref 133–144)
TRIGL SERPL-MCNC: 635 MG/DL
WBC # BLD AUTO: 9.1 10E9/L (ref 4–11)

## 2017-07-26 PROCEDURE — 85025 COMPLETE CBC W/AUTO DIFF WBC: CPT | Performed by: INTERNAL MEDICINE

## 2017-07-26 PROCEDURE — 80053 COMPREHEN METABOLIC PANEL: CPT | Performed by: INTERNAL MEDICINE

## 2017-07-26 PROCEDURE — 81206 BCR/ABL1 GENE MAJOR BP: CPT | Performed by: INTERNAL MEDICINE

## 2017-07-26 PROCEDURE — 99212 OFFICE O/P EST SF 10 MIN: CPT

## 2017-07-26 PROCEDURE — 83721 ASSAY OF BLOOD LIPOPROTEIN: CPT | Performed by: INTERNAL MEDICINE

## 2017-07-26 PROCEDURE — 80061 LIPID PANEL: CPT | Performed by: INTERNAL MEDICINE

## 2017-07-26 PROCEDURE — 36415 COLL VENOUS BLD VENIPUNCTURE: CPT

## 2017-07-26 ASSESSMENT — PAIN SCALES - GENERAL: PAINLEVEL: SEVERE PAIN (6)

## 2017-07-26 NOTE — NURSING NOTE
Chief Complaint   Patient presents with     Blood Draw     venipuncture     Vitals done and labs drawn, see flow sheets.  TOMASZ PADRON, CMA

## 2017-07-26 NOTE — PATIENT INSTRUCTIONS
RTC in  10/25 with labs; sooner if needed.  Continue to work on diet to loose weight and exercise.   We would hope he could loose weight 10-15 LBs until next visit  Stop smoking  Fast for next visit to measure lipid   He will call for knee appoitntment

## 2017-07-26 NOTE — NURSING NOTE
"Oncology Rooming Note    July 26, 2017 2:34 PM   Caesar Richardson is a 36 year old male who presents for:    Chief Complaint   Patient presents with     Blood Draw     venipuncture     RECHECK     Pt with CML--here for MD visit     Initial Vitals: /88  Pulse 96  Temp 98.4  F (36.9  C) (Oral)  Resp 16  Wt 95 kg (209 lb 8 oz)  SpO2 99%  BMI 32.81 kg/m2 Estimated body mass index is 32.81 kg/(m^2) as calculated from the following:    Height as of 9/14/16: 1.702 m (5' 7\").    Weight as of this encounter: 95 kg (209 lb 8 oz). Body surface area is 2.12 meters squared.  Severe Pain (6) Comment: bilateral knees   No LMP for male patient.  Allergies reviewed: Yes  Medications reviewed: Yes    Medications: Medication refills not needed today.  Pharmacy name entered into EPIC:    [facility] [place], [place] - [address] [facility]  [facility] [place], [place] - [address]    Clinical concerns: none     6 minutes for nursing intake (face to face time)     Lucie Van MA              "

## 2017-08-01 LAB — COPATH REPORT: NORMAL

## 2017-09-11 DIAGNOSIS — M25.569 ARTHRALGIA OF LOWER LEG, UNSPECIFIED LATERALITY: ICD-10-CM

## 2017-09-11 DIAGNOSIS — Z94.81 STATUS POST ALLOGENEIC BONE MARROW TRANSPLANT (H): ICD-10-CM

## 2017-09-11 DIAGNOSIS — C92.10 CHRONIC MYELOID LEUKEMIA (H): Primary | ICD-10-CM

## 2017-09-14 ENCOUNTER — OFFICE VISIT (OUTPATIENT)
Dept: ORTHOPEDICS | Facility: CLINIC | Age: 37
End: 2017-09-14

## 2017-09-14 VITALS — WEIGHT: 180 LBS | RESPIRATION RATE: 16 BRPM | HEIGHT: 67 IN | BODY MASS INDEX: 28.25 KG/M2

## 2017-09-14 DIAGNOSIS — M17.0 PRIMARY OSTEOARTHRITIS OF BOTH KNEES: Primary | ICD-10-CM

## 2017-09-14 RX ORDER — TRIAMCINOLONE ACETONIDE 40 MG/ML
80 INJECTION, SUSPENSION INTRA-ARTICULAR; INTRAMUSCULAR ONCE
Qty: 2 ML | Refills: 0 | OUTPATIENT
Start: 2017-09-14 | End: 2017-09-14

## 2017-09-14 NOTE — PROGRESS NOTES
" Subjective:   Caesar Richardson is a 37 year old male who is here complaining of bilateral knee pain. He is here requesting injections. Pt requests surgical consult.  Cortisone injections worked in the past.  Gaining some weight, knee pops and is painful.  Might have gotten 8 weeks out of last injection but lost insurance and had to go to court to get coverage back.  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 He also has no past medical history of Atypical fibroxanthoma; Basal cell carcinoma; Cutaneous T-cell lymphoma (H); Malignant melanoma (H); Other specified malignant neoplasm of skin of trunk, except scrotum; or Squamous cell carcinoma (H).  He  has a past surgical history that includes orthopedic surgery and transplant (2006).  His family history includes Allergies in his brother and mother; Arthritis in his father; Asthma in his brother; DIABETES in his father and paternal grandmother; HEART DISEASE in his father; Respiratory in his father and mother.  He reports that he has been smoking Cigarettes.  He has been smoking about 0.50 packs per day. He has never used smokeless tobacco. He reports that he does not drink alcohol or use illicit drugs.      ALLERGIES: He is allergic to no known allergies.    CURRENT MEDICATIONS: He has a current medication list which includes the following prescription(s): tiotropium, fluticasone-salmeterol, diclofenac, lorazepam, trazodone, clindamycin, and omeprazole.     REVIEW OF SYSTEMS: 10 point review of systems is negative except as noted above.     Exam:   Resp 16  Ht 5' 7\" (1.702 m)  Wt " 180 lb (81.6 kg)  BMI 28.19 kg/m2           CONSTITUTIONAL: healthy, alert, no distress and cooperative  HEAD: Normocephalic. No masses, lesions, tenderness or abnormalities  SKIN: no suspicious lesions or rashes  GAIT: antalgic and bowlegged  NEUROLOGIC: Non-focal  PSYCHIATRIC: affect normal/bright and mentation appears normal.    MUSCULOSKELETAL: bilateral knee pain      Inspection:       AP/lateral alignment normal  Tender: medial joint line      Non-tender: patellar facets, MCL, LCL, lateral joint line, IT band, posterior knee   Active Range of Motion: all normal  Strength: quad  5-/5, Hamstrings  5-/5, Gastroc  5/5, Tibialis anterior  5/5, Peroneals  5/5 and core strength  5/5 hip abductors and other core muscles   Special tests: normal Valgus stress test, normal Varus, negative Lachman's test, negative Anita's, no apprehension with lateral stress of the patella.    Procedure: risks and benefits, consent signed, 1 cc 40mg kenalog, 4 cc 1% lidocaine, lateral approach, band aid- procedure done both knees       Assessment/Plan:   Pt is a 36 yo white male with PMHx of RA, CML, Bone marrow transplant- chemotherapy and total body radiation presenting with bilateral knee pain  1. Bilateral knee pain- OA present, bilateral cortisone injections delivered, discussion regarding pool therapy and PT, he's swimming but would benefit from strengthening.  Pt requesting surgery opinion.  Pt in remission CML, underwent BMT with chemotherapy and radiations treatments.  Weight management suggested  RTC 12 weeks    X-RAY INTERPRETATION:   X-Ray of the Right Knee: 3-view, Mccann, lateral, sunrise   ordered and interpreted in the office today was positive for medial OA    X-Ray of the Left Knee: 3-view, Mccann, lateral, sunrise   ordered and interpreted in the office today was positive for medial OA

## 2017-09-14 NOTE — MR AVS SNAPSHOT
After Visit Summary   9/14/2017    Caesar Richardson    MRN: [id number]           Patient Information     Date Of Birth          1980        Visit Information        Provider Department      9/14/2017 11:45 AM Tiny Perez MD [facility] Sports Medicine        Today's Diagnoses     Primary osteoarthritis of both knees    -  1       Follow-ups after your visit        Additional Services     ORTHOPEDICS ADULT REFERRAL       Your provider has referred you to: PREFERRED PROVIDERS: Dr. Jimenez or Yue    Please be aware that coverage of these services is subject to the terms and limitations of your health insurance plan.  Call member services at your health plan with any benefit or coverage questions.      Please bring the following to your appointment:    >>   Any x-rays, CTs or MRIs which have been performed.  Contact the facility where they were done to arrange for  prior to your scheduled appointment.    >>   List of current medications   >>   This referral request   >>   Any documents/labs given to you for this referral                  Follow-up notes from your care team     Return in about 3 months (around 12/14/2017), or if symptoms worsen or fail to improve.      Your next 10 appointments already scheduled     Oct 25, 2017 10:00 AM CDT   [facility] with  [facility]   [facility] ([facility])    [address] [place] [address]  [address] [facility] [address] [phone]            Oct 25, 2017 10:30 AM CDT   Return with Sebas Leon MD   [facility] Blood and Marrow Transplant ([facility])    [address] [place] [address]  [address] [facility] [id number] [phone]            Oct 26, 2017  1:00 PM CDT   (Arrive by 12:45 PM)   NEW KNEE with Pradeep Turner MD   [facility] Orthopaedic Clinic ([facility])    [address] [place] [address]  4th Floor  [facility] [address]  "  [phone]              Who to contact     Please call your clinic at [phone] to:    Ask questions about your health    Make or cancel appointments    Discuss your medicines    Learn about your test results    Speak to your doctor   If you have compliments or concerns about an experience at your clinic, or if you wish to file a complaint, please contact [facility] Patient Relations at [phone] or email us at [email or web address]         Additional Information About Your Visit        FTRAN[employer]ar[employer]velo Information     [employer] gives you secure access to your electronic health record. If you see a primary care provider, you can also send messages to your care team and make appointments. If you have questions, please call your primary care clinic.  If you do not have a primary care provider, please call [phone] and they will assist you.      [employer] is an electronic gateway that provides easy, online access to your medical records. With [employer], you can request a clinic appointment, read your test results, renew a prescription or communicate with your care team.     To access your existing account, please contact your [facility] or call [phone] for assistance.        Care EveryWhere ID     This is your Care EveryWhere ID. This could be used by other organizations to access your [place] medical records  [id number]        Your Vitals Were     Respirations Height BMI (Body Mass Index)             16 5' 7\" (1.702 m) 28.19 kg/m2          Blood Pressure from Last 3 Encounters:   07/26/17 140/88   04/12/17 130/78   01/20/17 141/77    Weight from Last 3 Encounters:   09/14/17 180 lb (81.6 kg)   07/26/17 209 lb 8 oz (95 kg)   04/12/17 214 lb 14.4 oz (97.5 kg)              We Performed the Following     Large Joint/Bursa injection and/or drainage - Bilateral (Shoulder, Knee) [27360] [50 Mod]     ORTHOPEDICS ADULT REFERRAL     TRIAMCINOLONE ACET " INJ NOS          Today's Medication Changes          These changes are accurate as of: 9/14/17 12:22 PM.  If you have any questions, ask your nurse or doctor.               Start taking these medicines.        Dose/Directions    triamcinolone acetonide 40 MG/ML injection   Commonly known as:  KENALOG   Used for:  Primary osteoarthritis of both knees        Dose:  80 mg   2 mLs (80 mg) by INTRA-ARTICULAR route once for 1 dose   Quantity:  2 mL   Refills:  0            Where to get your medicines      Some of these will need a paper prescription and others can be bought over the counter.  Ask your nurse if you have questions.     You don't need a prescription for these medications     triamcinolone acetonide 40 MG/ML injection                Primary Care Provider    None Specified       No primary provider on file.        Equal Access to Services     ADALID CLEMENS : Kait Hui, jonathan nam, kaity powell, rosalee vicente. So [facility] [phone].    ATENCIÓN: Si habla español, tiene a st disposición servicios gratuitos de asistencia lingüística. Llame al [phone].    We comply with applicable federal civil rights laws and [place] laws. We do not discriminate on the basis of race, color, national origin, age, disability sex, sexual orientation or gender identity.            Thank you!     Thank you for choosing [facility]  for your care. Our goal is always to provide you with excellent care. Hearing back from our patients is one way we can continue to improve our services. Please take a few minutes to complete the written survey that you may receive in the mail after your visit with us. Thank you!             Your Updated Medication List - Protect others around you: Learn how to safely use, store and throw away your medicines at www.disposemymeds.org.          This list is accurate as of: 9/14/17 12:22 PM.  Always use your most recent med  list.                   Brand Name Dispense Instructions for use Diagnosis    clindamycin 1 % lotion    CLEOCIN-T    60 mL    Apply topically 2 times daily To forehead and affected areas    Status post allogeneic bone marrow transplant (H), CML (chronic myelocytic leukemia) (H), Bzhgn-slldpu-qacw disease, unspecified (H), Panlobular emphysema (H), Anxiety, Cough, Diminished libido, Erectile dysfunction, unspecified erectile dysfunction type, Heartburn       diclofenac 1 % Gel topical gel    VOLTAREN    2 Tube    Apply topically 4 times daily    CML (chronic myelocytic leukemia) (H), Panlobular emphysema (H), Anxiety, Cough, Status post allogeneic bone marrow transplant (H), Nvhpy-ldmdhw-blzj disease, unspecified (H), Diminished libido, Erectile dysfunction, unspecified erectile dysfunction type, Heartburn       fluticasone-salmeterol 250-50 MCG/DOSE diskus inhaler    ADVAIR    1 Inhaler    Inhale 1 puff into the lungs 2 times daily    Panlobular emphysema (H), CML (chronic myelocytic leukemia) (H), Anxiety, Cough, Status post allogeneic bone marrow transplant (H), Phvqb-qnnjfg-khye disease, unspecified (H), Diminished libido, Erectile dysfunction, unspecified erectile dysfunction type, Heartburn       LORazepam 1 MG tablet    ATIVAN    60 tablet    Take 1 tablet (1 mg) by mouth every 4 hours as needed for other (nausea, vomiting, anxiety)    CML (chronic myelocytic leukemia) (H), Anxiety, Panlobular emphysema (H), Cough, Status post allogeneic bone marrow transplant (H), Zopfh-azyjal-yirb disease, unspecified (H), Diminished libido, Erectile dysfunction, unspecified erectile dysfunction type, Heartburn       omeprazole 40 MG capsule    priLOSEC    30 capsule    Take 1 capsule (40 mg) by mouth daily    Status post allogeneic bone marrow transplant (H), CML (chronic myelocytic leukemia) (H), Ebvmq-cwolre-fybh disease, unspecified (H), Diminished libido, Erectile dysfunction, unspecified erectile dysfunction type,  Panlobular emphysema (H), Anxiety, Cough, Heartburn       tiotropium 18 MCG capsule    SPIRIVA HANDIHALER    30 capsule    Inhale contents of one capsule daily.    Panlobular emphysema (H), CML (chronic myelocytic leukemia) (H), Anxiety, Cough, Status post allogeneic bone marrow transplant (H), Caegz-zxwaxg-jfrj disease, unspecified (H), Diminished libido, Erectile dysfunction, unspecified erectile dysfunction type, Heartburn       traZODone 150 MG tablet    DESYREL    90 tablet    Take 1 tablet (150 mg) by mouth At Bedtime    Anxiety, CML (chronic myelocytic leukemia) (H), Zsegt-zrsvha-fluh disease, unspecified (H), Panlobular emphysema (H), Heartburn, Cough, Erectile dysfunction, unspecified erectile dysfunction type, Status post allogeneic bone marrow transplant (H), Diminished libido       triamcinolone acetonide 40 MG/ML injection    KENALOG    2 mL    2 mLs (80 mg) by INTRA-ARTICULAR route once for 1 dose    Primary osteoarthritis of both knees

## 2017-09-14 NOTE — NURSING NOTE
[address] [address]  Dept: [phone]  ______________________________________________________________________________    Patient: Caesar Richardson   : 1980   MRN: [id number]   2017    INVASIVE PROCEDURE SAFETY CHECKLIST    Date: 2017   Procedure:Bilateral kenalog injection  Patient Name: Caesar Richardson  MRN: [id number]  YOB: 1980    Action: Complete sections as appropriate. Any discrepancy results in a HARD COPY until resolved.     PRE PROCEDURE:  Patient ID verified with 2 identifiers (name and  or MRN): Yes  Procedure and site verified with patient/designee (when able): Yes  Accurate consent documentation in medical record: Yes  H&P (or appropriate assessment) documented in medical record: Yes  H&P must be up to 20 days prior to procedure and updates within 24 hours of procedure as applicable: NA  Relevant diagnostic and radiology test results appropriately labeled and displayed as applicable: Yes  Procedure site(s) marked with provider initials: NA    TIMEOUT:  Time-Out performed immediately prior to starting procedure, including verbal and active participation of all team members addressing the following:Yes  * Correct patient identify  * Confirmed that the correct side and site are marked  * An accurate procedure consent form  * Agreement on the procedure to be done  * Correct patient position  * Relevant images and results are properly labeled and appropriately displayed  * The need to administer antibiotics or fluids for irrigation purposes during the procedure as applicable   * Safety precautions based on patient history or medication use    DURING PROCEDURE: Verification of correct person, site, and procedures any time the responsibility for care of the patient is transferred to another member of the care team.     The following medication was given:     MEDICATION:  Kenalog 40 mg; 4 ml of 1%  lidocaine (in each syringe)  ROUTE: Intra-Articular  SITE: Bilateral knees  DOSE: Kenalog 40 mg; 4 ml of 1% lidocaine (in each syringe)  LOT #: Kenalog: [id number]; Lidocaine: [id number]  :Kenalog: [employer]; Lidocaine: [employer]  EXPIRATION DATE: Ken[place]/19; Lidocaine: 3/21  NDC#: [id number]; Lidocaine: 13897-019-49   Was there drug waste? Yes  Amount of drug waste (mL): 12.  Reason for waste:  As per MD Jo Tanner, ATC  2017

## 2017-09-14 NOTE — LETTER
9/14/2017      RE: Caesar Richardson  [address] [place] [id number]        Subjective:   Caesar Richardson is a 37 year old male who is here complaining of bilateral knee pain. He is here requesting injections. Pt requests surgical consult.  Cortisone injections worked in the past.  Gaining some weight, knee pops and is painful.  Might have gotten 8 weeks out of last injection but lost insurance and had to go to court to get coverage back.  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 He also has no past medical history of Atypical fibroxanthoma; Basal cell carcinoma; Cutaneous T-cell lymphoma (H); Malignant melanoma (H); Other specified malignant neoplasm of skin of trunk, except scrotum; or Squamous cell carcinoma (H).  He  has a past surgical history that includes orthopedic surgery and transplant (2006).  His family history includes Allergies in his brother and mother; Arthritis in his father; Asthma in his brother; DIABETES in his father and paternal grandmother; HEART DISEASE in his father; Respiratory in his father and mother.  He reports that he has been smoking Cigarettes.  He has been smoking about 0.50 packs per day. He has never used smokeless tobacco. He reports that he does not drink alcohol or use illicit drugs.      ALLERGIES: He is allergic to no known allergies.    CURRENT MEDICATIONS: He has a current medication list which includes the following prescription(s): tiotropium, fluticasone-salmeterol, diclofenac, lorazepam, trazodone, clindamycin, and omeprazole.     REVIEW OF SYSTEMS: 10 point review of systems  "is negative except as noted above.     Exam:   Resp 16  Ht 5' 7\" (1.702 m)  Wt 180 lb (81.6 kg)  BMI 28.19 kg/m2           CONSTITUTIONAL: healthy, alert, no distress and cooperative  HEAD: Normocephalic. No masses, lesions, tenderness or abnormalities  SKIN: no suspicious lesions or rashes  GAIT: antalgic and bowlegged  NEUROLOGIC: Non-focal  PSYCHIATRIC: affect normal/bright and mentation appears normal.    MUSCULOSKELETAL: bilateral knee pain      Inspection:       AP/lateral alignment normal  Tender: medial joint line      Non-tender: patellar facets, MCL, LCL, lateral joint line, IT band, posterior knee   Active Range of Motion: all normal  Strength: quad  5-/5, Hamstrings  5-/5, Gastroc  5/5, Tibialis anterior  5/5, Peroneals  5/5 and core strength  5/5 hip abductors and other core muscles   Special tests: normal Valgus stress test, normal Varus, negative Lachman's test, negative Anita's, no apprehension with lateral stress of the patella.    Procedure: risks and benefits, consent signed, 1 cc 40mg kenalog, 4 cc 1% lidocaine, lateral approach, band aid- procedure done both knees       Assessment/Plan:   Pt is a 36 yo white male with PMHx of RA, CML, Bone marrow transplant- chemotherapy and total body radiation presenting with bilateral knee pain  1. Bilateral knee pain- OA present, bilateral cortisone injections delivered, discussion regarding pool therapy and PT, he's swimming but would benefit from strengthening.  Pt requesting surgery opinion.  Pt in remission CML, underwent BMT with chemotherapy and radiations treatments.  Weight management suggested  RTC 12 weeks    X-RAY INTERPRETATION:   X-Ray of the Right Knee: 3-view, Mccann, lateral, sunrise   ordered and interpreted in the office today was positive for medial OA    X-Ray of the Left Knee: 3-view, Mccann, lateral, sunrise   ordered and interpreted in the office today was positive for medial OA    Tiny Perez MD    "

## 2017-10-13 NOTE — TELEPHONE ENCOUNTER
APPT INFORMATION    Date & Time: 10/26/17 1pm   Reason for Appt: Primary Osteoarthritis Bilat Knee   Referring Name/Clinic: Dr. Perez    Yes / No COMMENT / NOTES DATE & ACTION   Patient Contacted? no Internal referral        RECORDS CLINIC NAME  (use N/A if no records ) DATE & ACTION RECEIVED RECS & IMG? Y/N   (may include other helpful notes)   External Clinics: n/a           Internal Clinics: [facility]  Records & imaging in [facility]/pacs    [facility]  Records in [facility] with Dr. Leon

## 2017-10-17 DIAGNOSIS — M17.0 PRIMARY OSTEOARTHRITIS OF BOTH KNEES: Primary | ICD-10-CM

## 2017-10-25 ENCOUNTER — ONCOLOGY VISIT (OUTPATIENT)
Dept: TRANSPLANT | Facility: CLINIC | Age: 37
End: 2017-10-25
Attending: INTERNAL MEDICINE
Payer: MEDICARE

## 2017-10-25 ENCOUNTER — APPOINTMENT (OUTPATIENT)
Dept: LAB | Facility: CLINIC | Age: 37
End: 2017-10-25
Attending: INTERNAL MEDICINE
Payer: MEDICARE

## 2017-10-25 VITALS
BODY MASS INDEX: 31.96 KG/M2 | WEIGHT: 203.6 LBS | HEART RATE: 70 BPM | OXYGEN SATURATION: 98 % | DIASTOLIC BLOOD PRESSURE: 70 MMHG | RESPIRATION RATE: 18 BRPM | TEMPERATURE: 97.9 F | SYSTOLIC BLOOD PRESSURE: 113 MMHG | HEIGHT: 67 IN

## 2017-10-25 DIAGNOSIS — Z94.81 STATUS POST ALLOGENEIC BONE MARROW TRANSPLANT (H): ICD-10-CM

## 2017-10-25 DIAGNOSIS — E78.00 HYPERCHOLESTEREMIA: ICD-10-CM

## 2017-10-25 DIAGNOSIS — C92.10 CHRONIC MYELOID LEUKEMIA (H): Primary | ICD-10-CM

## 2017-10-25 LAB
ALBUMIN SERPL-MCNC: 3.7 G/DL (ref 3.4–5)
ALP SERPL-CCNC: 158 U/L (ref 40–150)
ALT SERPL W P-5'-P-CCNC: 68 U/L (ref 0–70)
ANION GAP SERPL CALCULATED.3IONS-SCNC: 6 MMOL/L (ref 3–14)
AST SERPL W P-5'-P-CCNC: 35 U/L (ref 0–45)
BASOPHILS # BLD AUTO: 0.1 10E9/L (ref 0–0.2)
BASOPHILS NFR BLD AUTO: 1 %
BILIRUB SERPL-MCNC: 0.4 MG/DL (ref 0.2–1.3)
BUN SERPL-MCNC: 16 MG/DL (ref 7–30)
CALCIUM SERPL-MCNC: 8.7 MG/DL (ref 8.5–10.1)
CHLORIDE SERPL-SCNC: 104 MMOL/L (ref 94–109)
CHOLEST SERPL-MCNC: 176 MG/DL
CO2 SERPL-SCNC: 26 MMOL/L (ref 20–32)
CREAT SERPL-MCNC: 1.04 MG/DL (ref 0.66–1.25)
DIFFERENTIAL METHOD BLD: ABNORMAL
EOSINOPHIL # BLD AUTO: 0.1 10E9/L (ref 0–0.7)
EOSINOPHIL NFR BLD AUTO: 1 %
ERYTHROCYTE [DISTWIDTH] IN BLOOD BY AUTOMATED COUNT: 13.4 % (ref 10–15)
GFR SERPL CREATININE-BSD FRML MDRD: 80 ML/MIN/1.7M2
GLUCOSE SERPL-MCNC: 73 MG/DL (ref 70–99)
HCT VFR BLD AUTO: 44.3 % (ref 40–53)
HDLC SERPL-MCNC: 33 MG/DL
HGB BLD-MCNC: 13.9 G/DL (ref 13.3–17.7)
LDLC SERPL CALC-MCNC: 109 MG/DL
LYMPHOCYTES # BLD AUTO: 2.7 10E9/L (ref 0.8–5.3)
LYMPHOCYTES NFR BLD AUTO: 23 %
MCH RBC QN AUTO: 28.9 PG (ref 26.5–33)
MCHC RBC AUTO-ENTMCNC: 31.4 G/DL (ref 31.5–36.5)
MCV RBC AUTO: 92 FL (ref 78–100)
MONOCYTES # BLD AUTO: 1 10E9/L (ref 0–1.3)
MONOCYTES NFR BLD AUTO: 8 %
NEUTROPHILS # BLD AUTO: 8 10E9/L (ref 1.6–8.3)
NEUTROPHILS NFR BLD AUTO: 67 %
NONHDLC SERPL-MCNC: 143 MG/DL
PLATELET # BLD AUTO: 298 10E9/L (ref 150–450)
POTASSIUM SERPL-SCNC: 4.1 MMOL/L (ref 3.4–5.3)
PROT SERPL-MCNC: 7.6 G/DL (ref 6.8–8.8)
RBC # BLD AUTO: 4.81 10E12/L (ref 4.4–5.9)
SODIUM SERPL-SCNC: 136 MMOL/L (ref 133–144)
TRIGL SERPL-MCNC: 172 MG/DL
TSH SERPL DL<=0.005 MIU/L-ACNC: 1.77 MU/L (ref 0.4–4)
WBC # BLD AUTO: 11.9 10E9/L (ref 4–11)

## 2017-10-25 PROCEDURE — 99211 OFF/OP EST MAY X REQ PHY/QHP: CPT | Mod: ZF

## 2017-10-25 PROCEDURE — 80061 LIPID PANEL: CPT | Performed by: INTERNAL MEDICINE

## 2017-10-25 PROCEDURE — 84478 ASSAY OF TRIGLYCERIDES: CPT | Performed by: INTERNAL MEDICINE

## 2017-10-25 PROCEDURE — 80053 COMPREHEN METABOLIC PANEL: CPT | Performed by: INTERNAL MEDICINE

## 2017-10-25 PROCEDURE — 84443 ASSAY THYROID STIM HORMONE: CPT | Performed by: INTERNAL MEDICINE

## 2017-10-25 PROCEDURE — 99212 OFFICE O/P EST SF 10 MIN: CPT

## 2017-10-25 PROCEDURE — 85025 COMPLETE CBC W/AUTO DIFF WBC: CPT | Performed by: INTERNAL MEDICINE

## 2017-10-25 ASSESSMENT — PAIN SCALES - GENERAL: PAINLEVEL: MODERATE PAIN (5)

## 2017-10-25 NOTE — MR AVS SNAPSHOT
After Visit Summary   10/25/2017    Caesar Richardson    MRN: [id number]           Patient Information     Date Of Birth          1980        Visit Information        Provider Department      10/25/2017 10:30 AM Sebas Leon MD [facility] Blood and Marrow Transplant        Today's Diagnoses     Chronic myeloid leukemia (H)    -  1    Status post allogeneic bone marrow transplant (H)        Hypercholesteremia              [facility] [place] [place]  [address] [place] [address]  Phone: [phone]  Clinic Hours:   Monday-Thursday:7am to 7pm   Friday: 7am to 5pm   Weekends and holidays:    8am to noon (in general)  If your fever is 100.5  or greater,   call the clinic.  After hours call the   hospital at [phone] or   [phone]. Ask for the BMT   fellow on-call           Care Instructions    RTC in  4/11/18 with labs; sooner if needed. 1030am arrival   Continue to work on diet to loose weight and exercise.   We would hope he could loose weight 10 LBs until next visit  Stop smoking  Fast for next visit to measure lipid   He will call for knee appoitntment          Follow-ups after your visit        Your next 10 appointments already scheduled     Oct 26, 2017 12:40 PM CDT   XR SIX FOOT STANDING EXTREMITIES with UCXR4   [facility] Imaging Center Xray ([facility])    [address] [place] [address] [phone]           Please bring a list of your current medicines to your exam. (Include vitamins, minerals and over-thecounter medicines.) Leave your valuables at home.  Tell your doctor if there is a chance you may be pregnant.  You do not need to do anything special for this exam.            Oct 26, 2017 12:50 PM CDT   XR KNEE BILATERAL 1/2 VIEWS with UCORTHXR2   [facility] Orthopaedics XRay ([facility])    [address] [place] [address]  4th [facility] [address] [phone]            Please bring a list of your current medicines to your exam. (Include vitamins, minerals and over-thecounter medicines.) Leave your valuables at home.  Tell your doctor if there is a chance you may be pregnant.  You do not need to do anything special for this exam.            Oct 26, 2017  1:00 PM CDT   (Arrive by 12:45 PM)   NEW KNEE with Pradeep Turner MD   [facility] ([facility])    [address] [place] [address]  4th Floor  [facility] [address] [phone]            Apr 11, 2018 10:30 AM CDT   Masonic Lab Draw with  MASONIC LAB DRAW   [facility] Masonic Lab Draw ([facility])    [address] [facility]  2nd Floor  [facility] [id number] [phone]            Apr 11, 2018 11:00 AM CDT   Return with Sebas Leon MD   [facility] Blood and Marrow Transplant ([facility])    [address] [place] [address]  2nd [facility] [id number] [phone]              Future tests that were ordered for you today     Open Future Orders        Priority Expected Expires Ordered    CBC with platelets differential Routine  4/23/2018 10/25/2017    Comprehensive metabolic panel Routine  4/23/2018 10/25/2017            Who to contact     If you have questions or need follow up information about today's clinic visit or your schedule please contact [facility]ARROW [facility]PL[facility] directly at [phone].  Normal or non-critical lab and imaging results will be communicated to you by MyChart, letter or phone within 4 business days after the clinic has received the results. If you do not hear from us within 7 days, please contact the clinic through MyChart or phone. If you have a critical or abnormal lab result, we will notify you by phone as soon as possible.  Submit refill requests through [employer] or call your pharmacy and they will forward the refill request to us. Please allow 3 business days for your refill to be  "completed.          Additional Information About Your Visit        [employer] Information     [employer] gives you secure access to your electronic health record. If you see a primary care provider, you can also send messages to your care team and make appointments. If you have questions, please call your primary care clinic.  If you do not have a primary care provider, please call [phone] and they will assist you.        Care EveryWhere ID     This is your Care EveryWhere ID. This could be used by other organizations to access your [place] medical records  [id number]        Your Vitals Were     Pulse Temperature Respirations Height Pulse Oximetry BMI (Body Mass Index)    70 97.9  F (36.6  C) (Oral) 18 1.702 m (5' 7.01\") 98% 31.88 kg/m2       Blood Pressure from Last 3 Encounters:   10/25/17 113/70   07/26/17 140/88   04/12/17 130/78    Weight from Last 3 Encounters:   10/25/17 92.4 kg (203 lb 9.6 oz)   09/14/17 81.6 kg (180 lb)   07/26/17 95 kg (209 lb 8 oz)              We Performed the Following     Lipid panel reflex to direct LDL Fasting     TSH with free T4 reflex          Today's Medication Changes          These changes are accurate as of: 10/25/17 11:10 AM.  If you have any questions, ask your nurse or doctor.               Stop taking these medicines if you haven't already. Please contact your care team if you have questions.     clindamycin 1 % lotion   Commonly known as:  CLEOCIN-T   Stopped by:  Sebas Leon MD           LORazepam 1 MG tablet   Commonly known as:  ATIVAN   Stopped by:  Sebas Leon MD                    Recent Review Flowsheet Data     BMT Recent Results Latest Ref Rng & Units 12/9/2015 3/9/2016 6/8/2016 9/14/2016 1/20/2017 4/12/2017 7/26/2017    WBC 4.0 - 11.0 10e9/L 7.4 8.5 9.1 8.8 8.6 8.7 9.1    Hemoglobin 13.3 - 17.7 g/dL 13.2(L) 14.9 14.0 14.5 14.9 14.2 14.9    Platelet Count 150 - 450 10e9/L 200 240 219 225 188 209 218    Neutrophils (Absolute) 1.6 - " 8.3 10e9/L 3.9 4.4 4.8 4.7 4.3 4.2 5.1    INR 0.86 - 1.14 - - - - - - -    Sodium 133 - 144 mmol/L 143 139 139 140 139 138 139    Potassium 3.4 - 5.3 mmol/L 3.5 4.4 3.8 4.2 4.3 4.3 4.1    Chloride 94 - 109 mmol/L 110(H) 106 109 109 108 108 107    Glucose 70 - 99 mg/dL 76 83 81 88 109(H) 82 83    Urea Nitrogen 7 - 30 mg/dL 14 13 17 15 12 17 16    Creatinine 0.66 - 1.25 mg/dL 0.93 1.00 1.00 1.13 0.98 1.13 1.24    Calcium (Total) 8.5 - 10.1 mg/dL 8.1(L) 9.0 9.1 9.2 8.9 8.8 9.4    Protein (Total) 6.8 - 8.8 g/dL 6.4(L) 7.5 7.5 7.6 7.1 7.2 7.5    Albumin 3.4 - 5.0 g/dL 3.5 3.8 4.0 4.0 3.8 3.9 3.8    Alkaline Phosphatase 40 - 150 U/L 169(H) 174(H) 151(H) 181(H) 156(H) 140 170(H)    AST 0 - 45 U/L 43 82(H) 49(H) 55(H) 52(H) 56(H) 58(H)    ALT 0 - 70 U/L 90(H) 178(H) 94(H) 93(H) 93(H) 104(H) 114(H)    MCV 78 - 100 fl 89 89 89 89 90 90 91               Primary Care Provider Office Phone # Fax #    Sebas Leon [phone]       [address] [place] [address]        Equal Access to Services     ADALID CLEMENS AH: Kait Hui, waaxda luqadaha, qaybta kaalmada andre, rosalee gutierrez [facility] [phone].    ATENCIÓN: Si habla monse, tiene a st disposición servicios gratuitos de asistencia lingüística. Zakia al [phone].    We comply with applicable federal civil rights laws and [place] laws. We do not discriminate on the basis of race, color, national origin, age, disability, sex, sexual orientation, or gender identity.            Thank you!     Thank you for choosing [facility]ARROW [facility]PL[facility]  for your care. Our goal is always to provide you with excellent care. Hearing back from our patients is one way we can continue to improve our services. Please take a few minutes to complete the written survey that you may receive in the mail after your visit with us. Thank you!             Your Updated Medication List - Protect  others around you: Learn how to safely use, store and throw away your medicines at www.disposemymeds.org.          This list is accurate as of: 10/25/17 11:10 AM.  Always use your most recent med list.                   Brand Name Dispense Instructions for use Diagnosis    diclofenac 1 % Gel topical gel    VOLTAREN    2 Tube    Apply topically 4 times daily    CML (chronic myelocytic leukemia) (H), Panlobular emphysema (H), Anxiety, Cough, Status post allogeneic bone marrow transplant (H), Jqyer-kcuxkj-dwhi disease, unspecified, Diminished libido, Erectile dysfunction, unspecified erectile dysfunction type, Heartburn       fluticasone-salmeterol 250-50 MCG/DOSE diskus inhaler    ADVAIR    1 Inhaler    Inhale 1 puff into the lungs 2 times daily    Panlobular emphysema (H), CML (chronic myelocytic leukemia) (H), Anxiety, Cough, Status post allogeneic bone marrow transplant (H), Msbzn-hfbvsa-mezx disease, unspecified, Diminished libido, Erectile dysfunction, unspecified erectile dysfunction type, Heartburn       omeprazole 40 MG capsule    priLOSEC    30 capsule    Take 1 capsule (40 mg) by mouth daily    Status post allogeneic bone marrow transplant (H), CML (chronic myelocytic leukemia) (H), Tlqhs-qtcpxa-bjuu disease, unspecified, Diminished libido, Erectile dysfunction, unspecified erectile dysfunction type, Panlobular emphysema (H), Anxiety, Cough, Heartburn       tiotropium 18 MCG capsule    SPIRIVA HANDIHALER    30 capsule    Inhale contents of one capsule daily.    Panlobular emphysema (H), CML (chronic myelocytic leukemia) (H), Anxiety, Cough, Status post allogeneic bone marrow transplant (H), Guubx-zvjkbv-grgd disease, unspecified, Diminished libido, Erectile dysfunction, unspecified erectile dysfunction type, Heartburn       traZODone 150 MG tablet    DESYREL    90 tablet    Take 1 tablet (150 mg) by mouth At Bedtime    Anxiety, CML (chronic myelocytic leukemia) (H), Jfylz-cbbxxn-mgvf disease, unspecified,  Panlobular emphysema (H), Heartburn, Cough, Erectile dysfunction, unspecified erectile dysfunction type, Status post allogeneic bone marrow transplant (H), Diminished libido

## 2017-10-25 NOTE — NURSING NOTE
"Oncology Rooming Note    October 25, 2017 10:36 AM   Caesar Richardson is a 37 year old male who presents for:    Chief Complaint   Patient presents with     Blood Draw     No lab orders. VS taken.     RECHECK     CML (chronic myelocytic leukemia)      Initial Vitals: /70 (BP Location: Right arm, Patient Position: Sitting, Cuff Size: Adult Regular)  Pulse 70  Temp 97.9  F (36.6  C) (Oral)  Resp 18  Ht 1.702 m (5' 7.01\")  Wt 92.4 kg (203 lb 9.6 oz)  SpO2 98%  BMI 31.88 kg/m2 Estimated body mass index is 31.88 kg/(m^2) as calculated from the following:    Height as of this encounter: 1.702 m (5' 7.01\").    Weight as of this encounter: 92.4 kg (203 lb 9.6 oz). Body surface area is 2.09 meters squared.  Moderate Pain (5) Comment: knees   No LMP for male patient.  Allergies reviewed: Yes  Medications reviewed: Yes    Medications: Medication refills not needed today.  Pharmacy name entered into EPIC:    [facility]  [place] [facility] [place], [place] - [address] [facility] [phone]    Clinical concerns:  No new concerns  Provider was notified.    5 minutes for nursing intake (face to face time)     Sandy Newman MA              "

## 2017-10-25 NOTE — PROGRESS NOTES
"BMT CLINIC PROGRESS NOTE   Caesar Richardson is a 34 year old male with CML status post allo HCT in distant past, and DLI in 6/13/2012.  CC:  Here for scheduled routine f/u.    HPI:  Mr. Richardson comes today feeling reasonably well. He had a few days off but got sick to her history. His knees started hurting and he could barely walk. His respiratory symptoms are now back to his baseline. He continues to smoke but less. He stopped drinking sodas and has lost 14 pounds according to all records. He has no new rashes. He is using his inhalers as directed. Came fasting as required for us to measure his lipid panel and triglycerides. Otherwise, no GI or  issues.   ROS:  10 point ROS negative except as noted above   Physical exam:/70 (BP Location: Right arm, Patient Position: Sitting, Cuff Size: Adult Regular)  Pulse 70  Temp 97.9  F (36.6  C) (Oral)  Resp 18  Ht 1.702 m (5' 7.01\")  Wt 92.4 kg (203 lb 9.6 oz)  SpO2 98%  BMI 31.88 kg/m2  Gen:  A few areas of skin erythema, but no skin plaques of lichenoid changes. Has acneiform rash on his forehead. Spiders angiomas on his upper chest and back.   HEENT: OP moist, no upper teeth.  No mucositis or lichenoid changes.  Pulm: lungs with NO coarse sounds today.   CVS: RRR, no m/r/g   ABD +BS, soft, nontender, obese  EXT: no edema, no rashes, no edema.   SKIN: No rash.  Decorative tattoo on back.new forehead firm lesions.   MSK:  Crepitation on R forearm when moving his thumb.   Neuro/Psych:  Depressed affect, conversant, oriented, somewhat anxious     LABS:  Lab Results   Component Value Date    WBC 11.9 (H) 10/25/2017    ANEU 8.0 10/25/2017    HGB 13.9 10/25/2017    HCT 44.3 10/25/2017     10/25/2017     10/25/2017    POTASSIUM 4.1 10/25/2017    CHLORIDE 104 10/25/2017    CO2 26 10/25/2017    GLC 73 10/25/2017    BUN 16 10/25/2017    CR 1.04 10/25/2017    MAG 2.0 03/12/2014    INR 0.87 03/10/2014    BILITOTAL 0.4 10/25/2017    AST 35 10/25/2017    ALT 68 " 10/25/2017    ALKPHOS 158 (H) 10/25/2017    PROTTOTAL 7.6 10/25/2017    ALBUMIN 3.7 10/25/2017       Recent Labs   Lab Test  07/26/17   1355  04/12/17   1044   CHOL  220*  210*   HDL  28*  35*   LDL  Cannot estimate LDL when triglyceride exceeds 400 mg/dL  154*  146*   TRIG  635*  144     ASSESSMENT AND PLAN:   1. Hematology/Chronic myelogenous leukemia:  Blood counts stable;   - S/P DLI on 6/13/12 for CML relapse. Achieved remission.    2. ID: afebrile. No active infection.    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 Spider angiomas could be liver disease but not enough enzyme change to support. Arthritis maybe GVHD but may do better with local steroid injection to avoid systemic effects, in particular emotional lability.     4. Anxiety/depression:  His emotional symtoms are well controlled today. his psycho-social issues are much improved now that he has support from social security disability.    5. FEN:  lost 14 lbs. I praised him for that and encouraged to lose another 10 lbs.     6. GI: No c/o. Mildly elevated LFTs    7. Pulmonary: shortness of breath under control now that he has coverage to use his inhalers. Recommended stop smoking again. he now has insurance coverage again and encouraged to use inhalers.     8. Skin: no rashes today. No fibrotic changes on huis skin. Spider angiomas on his chest and back (?)    9. Lipids: improved lipid profile with weight loss. Encouraged to lose more weight. Repeating cholesterol and triglycerides next visit in 6 months.       RTC in  4/11/18 with labs; sooner if needed.  Continue to work on diet to loose weight and exercise.   We would hope he could loose weight 10 LBs until next visit  Stop smoking  Fast for next visit to measure lipid   He will see knee doctor tomorrow

## 2017-10-25 NOTE — NURSING NOTE
Chief Complaint   Patient presents with     Blood Draw     No lab orders. VS taken.     Madie Woodson RN

## 2017-10-25 NOTE — PATIENT INSTRUCTIONS
RTC in  4/11/18 with labs; sooner if needed. 1030am arrival  (scheduled w/pt)  Continue to work on diet to loose weight and exercise.   We would hope he could loose weight 10 LBs until next visit  Stop smoking  Fast for next visit to measure lipid   He will call for knee appoitntment

## 2017-10-26 ENCOUNTER — PRE VISIT (OUTPATIENT)
Dept: ORTHOPEDICS | Facility: CLINIC | Age: 37
End: 2017-10-26

## 2017-10-26 ENCOUNTER — OFFICE VISIT (OUTPATIENT)
Dept: ORTHOPEDICS | Facility: CLINIC | Age: 37
End: 2017-10-26

## 2017-10-26 DIAGNOSIS — M17.2 POST-TRAUMATIC OSTEOARTHRITIS OF BOTH KNEES: Primary | ICD-10-CM

## 2017-10-26 DIAGNOSIS — M17.4 OTHER SECONDARY OSTEOARTHRITIS OF BOTH KNEES: ICD-10-CM

## 2017-10-26 ASSESSMENT — ENCOUNTER SYMPTOMS
SMELL DISTURBANCE: 0
MYALGIAS: 1
TACHYCARDIA: 0
HALLUCINATIONS: 0
HYPOTENSION: 0
ALTERED TEMPERATURE REGULATION: 1
EXERCISE INTOLERANCE: 1
LEG PAIN: 1
DIZZINESS: 0
SLEEP DISTURBANCES DUE TO BREATHING: 0
MEMORY LOSS: 0
SHORTNESS OF BREATH: 1
DISTURBANCES IN COORDINATION: 0
MUSCLE WEAKNESS: 0
NECK MASS: 0
HYPERTENSION: 0
POSTURAL DYSPNEA: 0
TROUBLE SWALLOWING: 0
DECREASED APPETITE: 0
TREMORS: 0
TINGLING: 0
HOARSE VOICE: 0
JOINT SWELLING: 1
BACK PAIN: 0
PARALYSIS: 0
NECK PAIN: 0
LOSS OF CONSCIOUSNESS: 0
DYSURIA: 0
LIGHT-HEADEDNESS: 0
POLYDIPSIA: 0
RESPIRATORY PAIN: 0
CLAUDICATION: 0
CHILLS: 0
POLYPHAGIA: 0
STIFFNESS: 1
MUSCLE CRAMPS: 1
FEVER: 0
DYSPNEA ON EXERTION: 1
SNORES LOUDLY: 0
NUMBNESS: 0
NIGHT SWEATS: 1
SPEECH CHANGE: 0
DIFFICULTY URINATING: 0
SORE THROAT: 0
PALPITATIONS: 0
WEIGHT LOSS: 1
WHEEZING: 1
FLANK PAIN: 0
WEAKNESS: 1
TASTE DISTURBANCE: 0
HEADACHES: 0
ORTHOPNEA: 0
LEG SWELLING: 1
WEIGHT GAIN: 0
SYNCOPE: 0
SINUS CONGESTION: 1
ARTHRALGIAS: 1
HEMATURIA: 0
INCREASED ENERGY: 0
SEIZURES: 0
FATIGUE: 1
SPUTUM PRODUCTION: 1
COUGH DISTURBING SLEEP: 0
COUGH: 1
SINUS PAIN: 1
HEMOPTYSIS: 0

## 2017-10-26 ASSESSMENT — KOOS JR
HOW SEVERE IS YOUR KNEE STIFFNESS AFTER FIRST WAKING IN MORNING: 1
HOW SEVERE IS YOUR KNEE STIFFNESS AFTER FIRST WAKING IN MORNING: SEVERE

## 2017-10-26 ASSESSMENT — ACTIVITIES OF DAILY LIVING (ADL): FUNCTION,_DAILY_LIVING_SCORE: 11.76

## 2017-10-26 NOTE — MR AVS SNAPSHOT
After Visit Summary   10/26/2017    Caesar Richardson    MRN: [id number]           Patient Information     Date Of Birth          1980        Visit Information        Provider Department      10/26/2017 1:00 PM Pradeep Turner MD [facility]         Follow-ups after your visit        Your next 10 appointments already scheduled     Vamsi 15, 2018 10:45 AM CST   (Arrive by 10:30 AM)   RETURN KNEE with Pradeep Turner MD   [facility]opa[facility] ([facility])    [address] [place] [address]  4th Floor  [facility] [id number] [phone]            Apr 11, 2018 10:30 AM CDT   Masonic Lab Draw with  MASONIC LAB DRAW   [facility] Masonic Lab Draw ([facility])    [address] [place] [address]  2nd [facility] [id number] [phone]            Apr 11, 2018 11:00 AM CDT   Return with Sebas Leon MD   [facility] Blood and Marrow Transplant ([facility])    [address] [place] [address]  2nd [facility] [id number] [phone]              Future tests that were ordered for you today     Open Future Orders        Priority Expected Expires Ordered    CBC with platelets differential Routine  4/23/2018 10/25/2017    Comprehensive metabolic panel Routine  4/23/2018 10/25/2017            Who to contact     Please call your clinic at [phone] to:    Ask questions about your health    Make or cancel appointments    Discuss your medicines    Learn about your test results    Speak to your doctor   If you have compliments or concerns about an experience at your clinic, or if you wish to file a complaint, please contact [facility] Patient Relations at [phone] or email us at [email or web address]         Additional Information About Your Visit        MyChart Information     [employer] gives you secure access to your electronic health record. If you see a primary care  provider, you can also send messages to your care team and make appointments. If you have questions, please call your primary care clinic.  If you do not have a primary care provider, please call [phone] and they will assist you.      [email or web address] is an electronic gateway that provides easy, online access to your medical records. With [email or web address], you can request a clinic appointment, read your test results, renew a prescription or communicate with your care team.     To access your existing account, please contact your [facility] or call [phone] for assistance.        Care EveryWhere ID     This is your Care EveryWhere ID. This could be used by other organizations to access your [place] medical records  [id number]         Blood Pressure from Last 3 Encounters:   10/25/17 113/70   07/26/17 140/88   04/12/17 130/78    Weight from Last 3 Encounters:   10/25/17 92.4 kg (203 lb 9.6 oz)   09/14/17 81.6 kg (180 lb)   07/26/17 95 kg (209 lb 8 oz)              Today, you had the following     No orders found for display       Primary Care Provider Office Phone # Fax #    Sebas Leon [phone]       [address] [facility] [address]  [facility] [address]        Equal Access to Services     ADALID CLEMENS : Hadii aad ku hadasho Soomaali, waaxda luqadaha, qaybta kaalmada adeegyada, waxay dorothy vicente. So [facility] [phone].    ATENCIÓN: Si habla español, tiene a st disposición servicios gratuitos de asistencia lingüística. Llame al [phone].    We comply with applicable federal civil rights laws and [place] laws. We do not discriminate on the basis of race, color, national origin, age, disability, sex, sexual orientation, or gender identity.            Thank you!     Thank you for choosing [facility]  for your care. Our goal is always to provide you with excellent care. Hearing back from our patients is one way we can continue  to improve our services. Please take a few minutes to complete the written survey that you may receive in the mail after your visit with us. Thank you!             Your Updated Medication List - Protect others around you: Learn how to safely use, store and throw away your medicines at www.disposemymeds.org.          This list is accurate as of: 10/26/17  2:40 PM.  Always use your most recent med list.                   Brand Name Dispense Instructions for use Diagnosis    diclofenac 1 % Gel topical gel    VOLTAREN    2 Tube    Apply topically 4 times daily    CML (chronic myelocytic leukemia) (H), Panlobular emphysema (H), Anxiety, Cough, Status post allogeneic bone marrow transplant (H), Wszuf-lnqawk-dnlc disease, unspecified, Diminished libido, Erectile dysfunction, unspecified erectile dysfunction type, Heartburn       fluticasone-salmeterol 250-50 MCG/DOSE diskus inhaler    ADVAIR    1 Inhaler    Inhale 1 puff into the lungs 2 times daily    Panlobular emphysema (H), CML (chronic myelocytic leukemia) (H), Anxiety, Cough, Status post allogeneic bone marrow transplant (H), Wfmbv-ykytyp-yhed disease, unspecified, Diminished libido, Erectile dysfunction, unspecified erectile dysfunction type, Heartburn       omeprazole 40 MG capsule    priLOSEC    30 capsule    Take 1 capsule (40 mg) by mouth daily    Status post allogeneic bone marrow transplant (H), CML (chronic myelocytic leukemia) (H), Yrlub-rifojn-wutc disease, unspecified, Diminished libido, Erectile dysfunction, unspecified erectile dysfunction type, Panlobular emphysema (H), Anxiety, Cough, Heartburn       tiotropium 18 MCG capsule    SPIRIVA HANDIHALER    30 capsule    Inhale contents of one capsule daily.    Panlobular emphysema (H), CML (chronic myelocytic leukemia) (H), Anxiety, Cough, Status post allogeneic bone marrow transplant (H), Nraxj-vryqqu-yvsg disease, unspecified, Diminished libido, Erectile dysfunction, unspecified erectile dysfunction type,  Heartburn       traZODone 150 MG tablet    DESYREL    90 tablet    Take 1 tablet (150 mg) by mouth At Bedtime    Anxiety, CML (chronic myelocytic leukemia) (H), Bbwve-bjdiwf-ihdq disease, unspecified, Panlobular emphysema (H), Heartburn, Cough, Erectile dysfunction, unspecified erectile dysfunction type, Status post allogeneic bone marrow transplant (H), Diminished libido

## 2017-10-26 NOTE — NURSING NOTE
Caesar is planning bilateral uni knee replacements.  Dr Turner asked that he stop smoking and take care of his dental issues before surgery.  He has hx of RA and cancer treatments.   He lives in [place] about [place] hours from [facility], underwent steroid injections both knees in September, he lives with his aunt and cousin and states they would be able to help out after surgery.  He will plan to return to clinic in February to see Dr Turner and have PAC visit, with surgery in early March.  We will cover the preop teaching at his next visit.

## 2017-10-26 NOTE — PROGRESS NOTES
"CC: bilateral knee pain L>R    HPI: LOU is seen in consultation from Dr. Perez for knee pain. 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 He localizes his pain as \"almost all on the inside\".  Denies mechanical symptoms but does have activity related swelling. Has night pain on a regular basis. Has done injections of synvisc and cortisone \"but they don't work well anymore\". Rates his daily pain a 6 or 7. Has tried bracing and therapy.    PMH: + CML, smoking    ROS: Reviewed    FH/SH: Reviewed    PE: Pleasant and cooperative male alert and oriented x3. Walks with bilateral varus deformity. Tenderness to palpate medial joint line. Nontender to lateral compartment. Minimal patellafemoral crepitus without hesitation or apprehension. Skin intact. L knee ROM -. Strength symmetrical. No neuro deficits. No joint swelling. No erythema. R knee ROM -6-110. Ligaments are stable on exam without laxity. Negative lachmans.     Xrays show bilateral varus deformity L/R -8/-6 with medial end stage bone on bone arthritis. Significant osteophytes noted. Lateral and PF compartments appear well preserved with minimal arthritic changes.    Dx:  1. Remission CML  2. 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 Smoking habituation    Plan:  1. Natural progress and plan of care was discussed. At this time patient has exhausted all conversative treatment options. Will recommend bilateral knee medial uniarthroplasties when he has stopped smoking for 3 months and regains his insurance.  Will do a follow up nicotine check prior to surgery.  Answers for HPI/ROS submitted by the patient on 10/26/2017   General Symptoms: Yes  Skin Symptoms: No  HENT Symptoms: Yes  EYE SYMPTOMS: No  HEART SYMPTOMS: Yes  LUNG SYMPTOMS: Yes  INTESTINAL SYMPTOMS: No  URINARY SYMPTOMS: Yes  REPRODUCTIVE SYMPTOMS: No  SKELETAL SYMPTOMS: Yes  BLOOD SYMPTOMS: No  NERVOUS SYSTEM SYMPTOMS: Yes  MENTAL " HEALTH SYMPTOMS: No  Fever: No  Loss of appetite: No  Weight loss: Yes  Weight gain: No  Fatigue: Yes  Night sweats: Yes  Chills: No  Increased stress: No  Excessive hunger: No  Excessive thirst: No  Feeling hot or cold when others believe the temperature is normal: Yes  Loss of height: No  Post-operative complications: No  Surgical site pain: No  Hallucinations: No  Change in or Loss of Energy: No  Hyperactivity: No  Confusion: No  Ear pain: No  Ear discharge: No  Hearing loss: No  Tinnitus: No  Nosebleeds: No  Congestion: Yes  Sinus pain: Yes  Trouble swallowing: No   Voice hoarseness: No  Mouth sores: No  Sore throat: No  Tooth pain: No  Gum tenderness: No  Bleeding gums: No  Change in taste: No  Change in sense of smell: No  Dry mouth: Yes  Hearing aid used: No  Neck lump: No  Cough: Yes  Sputum or phlegm: Yes  Coughing up blood: No  Difficulty breating or shortness of breath: Yes  Snoring: No  Wheezing: Yes  Difficulty breathing on exertion: Yes  Respiratory pain: No  Nighttime Cough: No  Difficulty breathing when lying flat: No  Chest pain or pressure: No  Fast or irregular heartbeat: No  Pain in legs with walking: Yes  Swelling in feet or ankles: Yes  Trouble breathing while lying down: No  Fingers or Toes appear blue: No  High blood pressure: No  Low blood pressure: No  Fainting: No  Murmurs: No  Chest pain on exertion: No  Chest pain at rest: No  Cramping pain in leg during exercise: No  Pacemaker: No  Varicose veins: No  Edema or swelling: No  Fast heart beat: No  Wake up at night with shortness of breath: No  Heart flutters: No  Light-headedness: No  Exercise intolerance: Yes  Trouble holding urine or incontinence: No  Pain or burning: No  Trouble starting or stopping: No  Increased frequency of urination: No  Blood in urine: No  Decreased frequency of urination: No  Frequent nighttime urination: No  Flank pain: No  Difficulty emptying bladder: No  Back pain: No  Muscle aches: Yes  Neck pain: No  Swollen  joints: Yes  Joint pain: Yes  Bone pain: Yes  Muscle cramps: Yes  Muscle weakness: No  Joint stiffness: Yes  Bone fracture: No  Trouble with coordination: No  Dizziness or trouble with balance: No  Fainting or black-out spells: No  Memory loss: No  Headache: No  Seizures: No  Speech problems: No  Tingling: No  Tremor: No  Weakness: Yes  Difficulty walking: Yes  Paralysis: No  Numbness: No    I, Dr. Pradeep Turner, have seen and examined this patient with JASON Rees, CNP.

## 2017-10-26 NOTE — LETTER
"10/26/2017       RE: Caesar Richardson  [address] [place] [id number]     Dear Colleague,    Thank you for referring your patient, Caesar Richardson, to the [facility] at [facility]. Please see a copy of my visit note below.    CC: bilateral knee pain L>R    HPI: LOU is seen in consultation from Dr. Perez for knee pain. 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 He localizes his pain as \"almost all on the inside\".  Denies mechanical symptoms but does have activity related swelling. Has night pain on a regular basis. Has done injections of synvisc and cortisone \"but they don't work well anymore\". Rates his daily pain a 6 or 7. Has tried bracing and therapy.    PMH: + CML, smoking    ROS: Reviewed    FH/SH: Reviewed    PE: Pleasant and cooperative male alert and oriented x3. Walks with bilateral varus deformity. Tenderness to palpate medial joint line. Nontender to lateral compartment. Minimal patellafemoral crepitus without hesitation or apprehension. Skin intact. L knee ROM -. Strength symmetrical. No neuro deficits. No joint swelling. No erythema. R knee ROM -6-110. Ligaments are stable on exam without laxity. Negative lachmans.     Xrays show bilateral varus deformity L/R -8/-6 with medial end stage bone on bone arthritis. Significant osteophytes noted. Lateral and PF compartments appear well preserved with minimal arthritic changes.    Dx:  1. Remission CML  2. 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 Smoking habituation    Plan:  1. Natural progress and plan of care was discussed. At this time patient has exhausted all conversative treatment options. Will recommend bilateral knee medial uniarthroplasties when he has stopped smoking for 3 months and regains his insurance.  Will do a follow up nicotine check prior to surgery.  Answers for HPI/ROS submitted by the patient on " 10/26/2017   General Symptoms: Yes  Skin Symptoms: No  HENT Symptoms: Yes  EYE SYMPTOMS: No  HEART SYMPTOMS: Yes  LUNG SYMPTOMS: Yes  INTESTINAL SYMPTOMS: No  URINARY SYMPTOMS: Yes  REPRODUCTIVE SYMPTOMS: No  SKELETAL SYMPTOMS: Yes  BLOOD SYMPTOMS: No  NERVOUS SYSTEM SYMPTOMS: Yes  MENTAL HEALTH SYMPTOMS: No  Fever: No  Loss of appetite: No  Weight loss: Yes  Weight gain: No  Fatigue: Yes  Night sweats: Yes  Chills: No  Increased stress: No  Excessive hunger: No  Excessive thirst: No  Feeling hot or cold when others believe the temperature is normal: Yes  Loss of height: No  Post-operative complications: No  Surgical site pain: No  Hallucinations: No  Change in or Loss of Energy: No  Hyperactivity: No  Confusion: No  Ear pain: No  Ear discharge: No  Hearing loss: No  Tinnitus: No  Nosebleeds: No  Congestion: Yes  Sinus pain: Yes  Trouble swallowing: No   Voice hoarseness: No  Mouth sores: No  Sore throat: No  Tooth pain: No  Gum tenderness: No  Bleeding gums: No  Change in taste: No  Change in sense of smell: No  Dry mouth: Yes  Hearing aid used: No  Neck lump: No  Cough: Yes  Sputum or phlegm: Yes  Coughing up blood: No  Difficulty breating or shortness of breath: Yes  Snoring: No  Wheezing: Yes  Difficulty breathing on exertion: Yes  Respiratory pain: No  Nighttime Cough: No  Difficulty breathing when lying flat: No  Chest pain or pressure: No  Fast or irregular heartbeat: No  Pain in legs with walking: Yes  Swelling in feet or ankles: Yes  Trouble breathing while lying down: No  Fingers or Toes appear blue: No  High blood pressure: No  Low blood pressure: No  Fainting: No  Murmurs: No  Chest pain on exertion: No  Chest pain at rest: No  Cramping pain in leg during exercise: No  Pacemaker: No  Varicose veins: No  Edema or swelling: No  Fast heart beat: No  Wake up at night with shortness of breath: No  Heart flutters: No  Light-headedness: No  Exercise intolerance: Yes  Trouble holding urine or incontinence:  No  Pain or burning: No  Trouble starting or stopping: No  Increased frequency of urination: No  Blood in urine: No  Decreased frequency of urination: No  Frequent nighttime urination: No  Flank pain: No  Difficulty emptying bladder: No  Back pain: No  Muscle aches: Yes  Neck pain: No  Swollen joints: Yes  Joint pain: Yes  Bone pain: Yes  Muscle cramps: Yes  Muscle weakness: No  Joint stiffness: Yes  Bone fracture: No  Trouble with coordination: No  Dizziness or trouble with balance: No  Fainting or black-out spells: No  Memory loss: No  Headache: No  Seizures: No  Speech problems: No  Tingling: No  Tremor: No  Weakness: Yes  Difficulty walking: Yes  Paralysis: No  Numbness: No    I, Dr. Pradeep Turner, have seen and examined this patient with JASON Rees, CNP.      Again, thank you for allowing me to participate in the care of your patient.      Sincerely,    Pradeep Turner MD

## 2017-10-27 ENCOUNTER — TELEPHONE (OUTPATIENT)
Dept: ORTHOPEDICS | Facility: CLINIC | Age: 37
End: 2017-10-27

## 2017-10-27 NOTE — TELEPHONE ENCOUNTER
Called patient to coordinate his return to clinic visit with Dr. Turner and PAC appt in preparation for surgery. Anticipated surgery date is March 1, 2018, with return visits on February 5th. Patient reiterated that he understands he must take care of his dental work and work on smoking cessation prior to these appointments.

## 2018-02-05 ENCOUNTER — RADIANT APPOINTMENT (OUTPATIENT)
Dept: GENERAL RADIOLOGY | Facility: CLINIC | Age: 38
End: 2018-02-05
Attending: NURSE PRACTITIONER
Payer: MEDICARE

## 2018-02-05 ENCOUNTER — ALLIED HEALTH/NURSE VISIT (OUTPATIENT)
Dept: SURGERY | Facility: CLINIC | Age: 38
End: 2018-02-05
Payer: MEDICARE

## 2018-02-05 ENCOUNTER — OFFICE VISIT (OUTPATIENT)
Dept: SURGERY | Facility: CLINIC | Age: 38
End: 2018-02-05
Payer: MEDICARE

## 2018-02-05 ENCOUNTER — ANESTHESIA EVENT (OUTPATIENT)
Dept: SURGERY | Facility: CLINIC | Age: 38
End: 2018-02-05

## 2018-02-05 ENCOUNTER — OFFICE VISIT (OUTPATIENT)
Dept: ORTHOPEDICS | Facility: CLINIC | Age: 38
End: 2018-02-05
Payer: MEDICARE

## 2018-02-05 ENCOUNTER — APPOINTMENT (OUTPATIENT)
Dept: SURGERY | Facility: CLINIC | Age: 38
End: 2018-02-05
Payer: MEDICARE

## 2018-02-05 VITALS
TEMPERATURE: 97.9 F | HEART RATE: 76 BPM | RESPIRATION RATE: 20 BRPM | SYSTOLIC BLOOD PRESSURE: 133 MMHG | HEIGHT: 67 IN | BODY MASS INDEX: 32.51 KG/M2 | OXYGEN SATURATION: 98 % | WEIGHT: 207.1 LBS | DIASTOLIC BLOOD PRESSURE: 87 MMHG

## 2018-02-05 DIAGNOSIS — M17.0 PRIMARY OSTEOARTHRITIS OF BOTH KNEES: ICD-10-CM

## 2018-02-05 DIAGNOSIS — Z01.818 PREOP EXAMINATION: Primary | ICD-10-CM

## 2018-02-05 DIAGNOSIS — M17.0 PRIMARY OSTEOARTHRITIS OF BOTH KNEES: Primary | ICD-10-CM

## 2018-02-05 DIAGNOSIS — M17.2 BILATERAL POST-TRAUMATIC OSTEOARTHRITIS OF KNEE: ICD-10-CM

## 2018-02-05 ASSESSMENT — ENCOUNTER SYMPTOMS
VOMITING: 0
NIGHT SWEATS: 0
JAUNDICE: 0
MEMORY LOSS: 0
POLYPHAGIA: 0
DECREASED APPETITE: 0
COUGH: 0
MUSCLE WEAKNESS: 0
SPUTUM PRODUCTION: 0
CONSTIPATION: 0
ARTHRALGIAS: 1
ABDOMINAL PAIN: 0
SEIZURES: 0
SNORES LOUDLY: 0
DIARRHEA: 0
JOINT SWELLING: 0
BLOATING: 0
POLYDIPSIA: 0
HEARTBURN: 1
DISTURBANCES IN COORDINATION: 0
STIFFNESS: 1
PARALYSIS: 0
BACK PAIN: 0
WEIGHT GAIN: 1
HALLUCINATIONS: 0
SPEECH CHANGE: 0
TREMORS: 0
HEADACHES: 0
DIZZINESS: 0
MYALGIAS: 1
NAUSEA: 0
WEAKNESS: 1
INCREASED ENERGY: 0
POSTURAL DYSPNEA: 0
TINGLING: 1
DYSPNEA ON EXERTION: 1
SHORTNESS OF BREATH: 1
BOWEL INCONTINENCE: 0
FEVER: 0
BLOOD IN STOOL: 0
WEIGHT LOSS: 0
NUMBNESS: 1
HEMOPTYSIS: 0
RECTAL PAIN: 0
MUSCLE CRAMPS: 1
FATIGUE: 1
COUGH DISTURBING SLEEP: 0
WHEEZING: 0
ALTERED TEMPERATURE REGULATION: 0
NECK PAIN: 0
CHILLS: 0
LOSS OF CONSCIOUSNESS: 0

## 2018-02-05 ASSESSMENT — COPD QUESTIONNAIRES: COPD: 1

## 2018-02-05 ASSESSMENT — LIFESTYLE VARIABLES: TOBACCO_USE: 1

## 2018-02-05 NOTE — PROGRESS NOTES
"Cc: bilateral knee pain    HPI:  LOU is seen in follow up for repeat consultation for his upcoming bilateral medial uniarthroplasties. He reports he has stopped smoking and has \"finished all his dental procedures needed and was given the ok from his dentist.\". Continues to report his pain as medial to both knees with \"every step\". Has night pain. Pain with stairs. Taking advil daily. Feels instability that seems to be associated \"with the pain\".  Daily pain is an \"8\".    PMH: Reviewed    ROS: Reviewed    PE: Unchaged from visit 10/26/2017    Xrays reviewed and stress views taken today show minimal collapse or change to bilateral lateral compartments.    Dx: bilateral medial uniarthroplasties    Plan:  1. Ok to proceed with bilateral medial uniarthroplasties. Risks, benefits, prognosis and post op complications were discussed including blood clots, stiffness, nerve palsy, infection and continued pain. Informed consent obtained.  Answers for HPI/ROS submitted by the patient on 2/5/2018   General Symptoms: Yes  Skin Symptoms: No  HENT Symptoms: No  EYE SYMPTOMS: No  HEART SYMPTOMS: No  LUNG SYMPTOMS: Yes  INTESTINAL SYMPTOMS: Yes  URINARY SYMPTOMS: No  REPRODUCTIVE SYMPTOMS: No  SKELETAL SYMPTOMS: Yes  BLOOD SYMPTOMS: No  NERVOUS SYSTEM SYMPTOMS: Yes  MENTAL HEALTH SYMPTOMS: No  Fever: No  Loss of appetite: No  Weight loss: No  Weight gain: Yes  Fatigue: Yes  Night sweats: No  Chills: No  Increased stress: No  Excessive hunger: No  Excessive thirst: No  Feeling hot or cold when others believe the temperature is normal: No  Loss of height: No  Post-operative complications: No  Surgical site pain: No  Hallucinations: No  Change in or Loss of Energy: No  Hyperactivity: No  Confusion: No  Cough: No  Sputum or phlegm: No  Coughing up blood: No  Difficulty breating or shortness of breath: Yes  Snoring: No  Wheezing: No  Difficulty breathing on exertion: Yes  Nighttime Cough: No  Difficulty breathing when lying flat: No  Heart " burn or indigestion: Yes  Nausea: No  Vomiting: No  Abdominal pain: No  Bloating: No  Constipation: No  Diarrhea: No  Blood in stool: No  Black stools: No  Rectal or Anal pain: No  Fecal incontinence: No  Yellowing of skin or eyes: No  Vomit with blood: No  Change in stools: No  Back pain: No  Muscle aches: Yes  Neck pain: No  Swollen joints: No  Joint pain: Yes  Bone pain: Yes  Muscle cramps: Yes  Muscle weakness: No  Joint stiffness: Yes  Bone fracture: No  Trouble with coordination: No  Dizziness or trouble with balance: No  Fainting or black-out spells: No  Memory loss: No  Headache: No  Seizures: No  Speech problems: No  Tingling: Yes  Tremor: No  Weakness: Yes  Difficulty walking: Yes  Paralysis: No  Numbness: Yes    I, Dr. Pradeep Turner, have seen and examined this patient with JASON Rees, CNP.

## 2018-02-05 NOTE — LETTER
"2/5/2018       RE: Caesar Richardson  [address] [place] [id number]     Dear Colleague,    Thank you for referring your patient, Caesar Richardson, to the [facility] at [facility]. Please see a copy of my visit note below.    Cc: bilateral knee pain    HPI:  MJ is seen in follow up for repeat consultation for his upcoming bilateral medial uniarthroplasties. He reports he has stopped smoking and has \"finished all his dental procedures needed and was given the ok from his dentist.\". Continues to report his pain as medial to both knees with \"every step\". Has night pain. Pain with stairs. Taking advil daily. Feels instability that seems to be associated \"with the pain\".  Daily pain is an \"8\".    PMH: Reviewed    ROS: Reviewed    PE: Unchaged from visit 10/26/2017    Xrays reviewed and stress views taken today show minimal collapse or change to bilateral lateral compartments.    Dx: bilateral medial uniarthroplasties    Plan:  1. Ok to proceed with bilateral medial uniarthroplasties. Risks, benefits, prognosis and post op complications were discussed including blood clots, stiffness, nerve palsy, infection and continued pain. Informed consent obtained.    I, Dr. Pradeep Turner, have seen and examined this patient with JASON Rees, CNP.      Again, thank you for allowing me to participate in the care of your patient.      Sincerely,    Pradeep Turner MD      "

## 2018-02-05 NOTE — MR AVS SNAPSHOT
After Visit Summary   2/5/2018    Caesar Richardson    MRN: [id number]           Patient Information     Date Of Birth          1980        Visit Information        Provider Department      2/5/2018 10:30 AM Pradeep Turner MD [facility]        Today's Diagnoses     Primary osteoarthritis of both knees    -  1       Follow-ups after your visit        Your next 10 appointments already scheduled     Feb 05, 2018  1:00 PM CST   (Arrive by 12:45 PM)   PAC Anesthesia Consult with  Pac Anesthesiologist   [facility] ([facility])    [address] [place] [id number]   [phone]            Feb 05, 2018  1:15 PM CST   LAB with  LAB   [facility] ([facility])    [address] [place] [id number] [phone]           Please do not eat 10-12 hours before your appointment if you are coming in fasting for labs on lipids, cholesterol, or glucose (sugar). This does not apply to pregnant women. Water, hot tea and black coffee (with nothing added) are okay. Do not drink other fluids, diet soda or chew gum.            Mar 01, 2018   Procedure with Pradeep Turner MD   [facility] [place], Same Day Surgery (--)    [address]  [facility] [place] [address]   [phone]            Mar 15, 2018 10:15 AM CDT   (Arrive by 10:00 AM)   Post-Op with JASON Swanson CNP   [facility] ([facility])    [address] [place] [id number] [phone]            Apr 09, 2018 11:15 AM CDT   (Arrive by 11:00 AM)   RETURN KNEE with Pradeep Turner MD   [facility] ([facility])    [address] [place] [id number] [phone]            Apr 11, 2018 10:30 AM CDT   [facility] Draw with  [facility] DRAW   [facility] ([facility]  [facility] [place])    [address]  [address]  [facility] [id number] [phone]            Apr 11, 2018 11:00 AM CDT   Return with  BMT DOM   [facility] ([facility])    [address]  [address]  [facility] [id number] [phone]              Who to contact     Please call your clinic at [phone] to:    Ask questions about your health    Make or cancel appointments    Discuss your medicines    Learn about your test results    Speak to your doctor   If you have compliments or concerns about an experience at your clinic, or if you wish to file a complaint, please contact [facility] Patient Relations at [phone] or email us at [email or web address]         Additional Information About Your Visit        MyChart Information     [employer] gives you secure access to your electronic health record. If you see a primary care provider, you can also send messages to your care team and make appointments. If you have questions, please call your primary care clinic.  If you do not have a primary care provider, please call [phone] and they will assist you.      [employer] is an electronic gateway that provides easy, online access to your medical records. With [employer], you can request a clinic appointment, read your test results, renew a prescription or communicate with your care team.     To access your existing account, please contact your [facility] Clinic or call [phone] for assistance.        Care EveryWhere ID     This is your Care EveryWhere ID. This could be used by other organizations to access your [facility] medical records  [id number]         Blood Pressure from Last 3 Encounters:   02/05/18 133/87   10/25/17 113/70   07/26/17 140/88    Weight from Last 3 Encounters:   02/05/18 93.9 kg (207 lb 1.6 oz)   10/25/17 92.4 kg (203 lb 9.6 oz)   09/14/17 81.6 kg (180  lb)                 Today's Medication Changes          These changes are accurate as of 2/5/18 12:58 PM.  If you have any questions, ask your nurse or doctor.               These medicines have changed or have updated prescriptions.        Dose/Directions    traZODone 150 MG tablet   Commonly known as:  DESYREL   This may have changed:    - how much to take  - when to take this   Used for:  Anxiety, CML (chronic myelocytic leukemia) (H), Hjyhu-uufiyg-paac disease, unspecified, Panlobular emphysema (H), Heartburn, Cough, Erectile dysfunction, unspecified erectile dysfunction type, Status post allogeneic bone marrow transplant (H), Diminished libido        Dose:  150 mg   Take 1 tablet (150 mg) by mouth At Bedtime   Quantity:  90 tablet   Refills:  6                Primary Care Provider Office Phone # Fax #    Sebas Leon [phone]       [address] [place] [address]        Equal Access to Services     [facility] CampusGEOVANNY : Hadii alison smith Soneyda, waaxda luqadaha, qaybta kaalmada ademalvinyaanoop, rosalee luque . So [facility] [phone].    ATENCIÓN: Si habla español, tiene a st disposición servicios gratuitos de asistencia lingüística. Zakia al [phone].    We comply with applicable federal civil rights laws and [place] laws. We do not discriminate on the basis of race, color, national origin, age, disability, sex, sexual orientation, or gender identity.            Thank you!     Thank you for choosing [facility]  for your care. Our goal is always to provide you with excellent care. Hearing back from our patients is one way we can continue to improve our services. Please take a few minutes to complete the written survey that you may receive in the mail after your visit with us. Thank you!             Your Updated Medication List - Protect others around you: Learn how to safely use, store and throw away your medicines at  www.disposemymeds.org.          This list is accurate as of 2/5/18 12:58 PM.  Always use your most recent med list.                   Brand Name Dispense Instructions for use Diagnosis    diclofenac 1 % Gel topical gel    VOLTAREN    2 Tube    Apply topically 4 times daily    CML (chronic myelocytic leukemia) (H), Panlobular emphysema (H), Anxiety, Cough, Status post allogeneic bone marrow transplant (H), Hipct-qouvwc-lcjg disease, unspecified, Diminished libido, Erectile dysfunction, unspecified erectile dysfunction type, Heartburn       fluticasone-salmeterol 250-50 MCG/DOSE diskus inhaler    ADVAIR    1 Inhaler    Inhale 1 puff into the lungs 2 times daily    Panlobular emphysema (H), CML (chronic myelocytic leukemia) (H), Anxiety, Cough, Status post allogeneic bone marrow transplant (H), Yxeth-tgbfye-ipnx disease, unspecified, Diminished libido, Erectile dysfunction, unspecified erectile dysfunction type, Heartburn       tiotropium 18 MCG capsule    SPIRIVA HANDIHALER    30 capsule    Inhale contents of one capsule daily.    Panlobular emphysema (H), CML (chronic myelocytic leukemia) (H), Anxiety, Cough, Status post allogeneic bone marrow transplant (H), Calvw-tbsbeb-dtvy disease, unspecified, Diminished libido, Erectile dysfunction, unspecified erectile dysfunction type, Heartburn       traZODone 150 MG tablet    DESYREL    90 tablet    Take 1 tablet (150 mg) by mouth At Bedtime    Anxiety, CML (chronic myelocytic leukemia) (H), Kkwfy-ypszpu-dksk disease, unspecified, Panlobular emphysema (H), Heartburn, Cough, Erectile dysfunction, unspecified erectile dysfunction type, Status post allogeneic bone marrow transplant (H), Diminished libido

## 2018-02-05 NOTE — PATIENT INSTRUCTIONS
Preparing for Your Surgery      Name:  Caesar Richardson   MRN:  [id number]   :  1980   Today's Date:  2018     Arriving for surgery:  Bilateral Knee Arthroplasty   Surgery date: 2018   Arrival time:  8:30 am  Please come to:     [facility] [address]  [address] [place]  [address]    - parking is available in front of [facility] from 5:15AM to 8:00PM. If you prefer, park your car in the Green Lot.    -Proceed to the 3rd floor, check in at the Adult Surgery Waiting Lounge. [phone]    If an escort is needed stop at the Information Desk in the lobby. Inform the information person that you are here for surgery. An escort to the Adult Surgery Waiting Lounge will be provided.        What can I eat or drink?  -  You may have solid food or milk products until 8 hours prior to your surgery.  Nothing after 3 am   -  You may have water, apple juice or 7up/Sprite until 2 hours prior to your surgery.  Until 8:30 am arrival time     Which medicines can I take?  -  Do NOT take these medications in the morning, the day of surgery:      Hold aspirin, vitamins and supplements for 1 week before surgery      Hold Ibuprofen for 24 hours before surgery       -  Please take these medications the day of surgery:       Inhalers as usual and bring to hospital       How do I prepare myself?  -  Take two showers: one the night before surgery; and one the morning of surgery.         Use Scrubcare or Hibiclens to wash from neck down.  You may use your own shampoo and conditioner. No other hair products.   -  Do NOT use lotion, powder, deodorant, or antiperspirant the day of your surgery.  -  Do NOT wear any makeup, fingernail polish or jewelry.  -  Begin using Incentive Spirometer 1 week prior to surgery.  Use 4 times per day, up to 5 breaths each time.  Bring Incentive Spirometer to hospital.  -Do not bring your own medications to the hospital, except for  inhalers and eye drops.  -  Bring your ID and insurance card.    Questions or Concerns:  If you have questions or concerns, please call the  Preoperative Assessment Center, Monday-Friday 7AM-7PM:  [phone]    Hospital Visiting Restrictions:   Due to the current measles outbreak, visitor restrictions are in place in the hospitals. No visitors under 5 are allowed to visit. Also, visitors who are unvaccinated for measles and those who are currently ill are also asked to refrain from visiting.  AFTER YOUR SURGERY  Breathing exercises   Breathing exercises help you recover faster. Take deep breaths and let the air out slowly. This will:     Help you wake up after surgery.    Help prevent complications like pneumonia.  Preventing complications will help you go home sooner.   We may give you a breathing device (incentive spirometer) to encourage you to breathe deeply.   Nausea and vomiting   You may feel sick to your stomach after surgery; if so, let your nurse know.    Pain control:  After surgery, you may have pain. Our goal is to help you manage your pain. Pain medicine will help you feel comfortable enough to do activities that will help you heal.  These activities may include breathing exercises, walking and physical therapy.   To help your health care team treat your pain we will ask: 1) If you have pain  2) where it is located 3) describe your pain in your words  Methods of pain control include medications given by mouth, vein or by nerve block for some surgeries.  We may give you a pain control pump that will:  1) Deliver the medicine through a tube placed in your vein  2) Control the amount of medicine you receive  3) Allow you to push a button to deliver a dose of pain medicine  Sequential Compression Device (SCD) or Pneumo Boots:  You may need to wear SCD S on your legs or feet. These are wraps connected to a machine that pumps in air and releases it. The repeated pumping helps prevent blood clots from  forming   Enhanced Recovery After Surgery     This is a team effort, including you, to get you back on your feet, eating and drinking normally and out of the hospital as quickly as possible.  The goals are:   1) NO INFECTIONS and   2) RETURN TO NORMAL DIET    How can we achieve these goals?  1) STAY ACTIVE: Walk every day before your surgery; try to increase the amount every day.  Walk after surgery as much as you can-the nurses will help you.  Walking speeds healing and gets you home quicker, you heal better at home and have less risk of infection.     2) INCENTIVE SPIROMETER: Practice your incentive spirometer 4 times per day with 5-10 repetitions each time.  Using the incentive spirometer can strengthen your muscles between your ribs and help you have a strong cough after surgery.  A more effective cough can help prevent problems with your lungs.    3) STAY HYDRATED: Drink clear liquids up until 2 hours before your surgery. We would like you to purchase a drink such as Gatorade or Ensure Clear (not the milkshake type).  Drink this before bedtime and on the way into the hospital, drink between 8-12 ounces or until you feel hydrated.  Keeping well hydrated leads to your veins being plump, you wake up faster, and you are less likely to be nauseated. Start drinking water as soon as you can after surgery and advance to clear liquids and food as tolerated.  IV fluids contain salt, drinking fluids will minimize the amount of IV fluids you need and decrease the amount of salt you get.    The most common reason for the patient to be readmitted is dehydration. Staying hydrated after you go home from the hospital is very important.  Ensure or Ensure Clear are good options to keep you hydrated.     4) PAIN MANAGEMENT: If we minimize the amount of opioids and narcotics, and use regional blocks (which numb the area where your surgery is) along with oral pain medications; you will have less side effects of nausea and  constipation. Narcotics can slow down your bowels and cause you to stay in the hospital longer.     Our goal is to keep you comfortable; eating and drinking normally and back home safely.   Using an Incentive Spirometer    An incentive spirometer is a device that helps you do deep breathing exercises. These exercises expand your lungs, aid in circulation, and help prevent pneumonia. Deep breathing exercises also help you breathe better and improve the function of your lungs by:    Keeping your lungs clear    Strengthening your breathing muscles    Helping prevent respiratory complications or problems  The incentive spirometer gives you a way to take an active part in your care. A nurse or therapist will teach you breathing exercises. To do these exercises, you will breathe in through your mouth and not your nose. The incentive spirometer only works correctly if you breathe in through your mouth.    Steps to clear lungs  Step 1. Exhale normally. Then, inhale normally.    Relax and breathe out.  Step 2. Place your lips tightly around the mouthpiece.    Make sure the device is upright and not tilted.  Step 3. Inhale as much air as you can through the mouthpiece (don't breath through your nose).    Inhale slowly and deeply.    Hold your breath long enough to keep the balls or disk raised for at least 3 to 5 seconds, or as instructed by your healthcare provider.  Step 4. Repeat the exercise regularly.  Begin using the Incentive Spirometer one week prior to your surgery, 4 times per day-5 times each.

## 2018-02-05 NOTE — H&P
Pre-Operative H & P     CC:  Preoperative exam to assess for increased cardiopulmonary risk while undergoing surgery and anesthesia.    Date of Encounter: February 5, 2018   Primary Care Physician:  Sebas Leon   Reason for Visit/Surgery:  Bilateral post-traumatic osteoarthritis of knee [M17.2]      HPI  Casear Richardson is a 37 year old male who presents for pre-operative H & P in preparation for a Bilateral Knee uni compartmental arthroplasty on3/1/18  with Dr. Turner for Post-traumatic osteoarthritis of both knees at the [facility].      Caesar Richardson   has a past medical history of Arthritis; BMT (bone marrow transplant) (2006); CML (chronic myelocytic leukemia) (H); COPD (chronic obstructive pulmonary disease) (H) (1/9/2013); GERD (gastroesophageal reflux disease); Laxtn-zauyzg-yydy disease (H) (7/21/2011); and Rheumatoid arthritis (H) (7/21/2011).      History is obtained from the patient and medical record including Care Everywhere.        Past Surgical History  Past Surgical History:   Procedure Laterality Date     BMT CELL PRODUCT INFUSION  2012     ORTHOPEDIC SURGERY  2007     TRANSPLANT  2006    BMT       Hx of Blood transfusions/reactions: No transfusion history     Personal or FH with difficulty with Anesthesia:  None    Prior to Admission Medications  Current Outpatient Prescriptions   Medication Sig Dispense Refill     tiotropium (SPIRIVA HANDIHALER) 18 MCG capsule Inhale contents of one capsule daily. 30 capsule 12     diclofenac (VOLTAREN) 1 % GEL Apply topically 4 times daily 2 Tube 6     traZODone (DESYREL) 150 MG tablet Take 1 tablet (150 mg) by mouth At Bedtime (Patient taking differently: Take 75 mg by mouth every other day ) 90 tablet 6     fluticasone-salmeterol (ADVAIR) 250-50 MCG/DOSE diskus inhaler Inhale 1 puff into the lungs 2 times daily 1 Inhaler 12         Allergies  No known allergies     Social History  Social History      Social History     Marital status:      Spouse name: N/A     Number of children: 0     Years of education: 12     Occupational History     lawn care       [employer]     Social History Main Topics     Smoking status: Current Every Day Smoker     Packs/day: 1.00     Years: 25.00     Types: Cigarettes     Smokeless tobacco: Never Used      Comment: down to few cigs per week     Alcohol use No     Drug use: No     Sexual activity: Not on file     Other Topics Concern      Service No     Blood Transfusions No     Caffeine Concern No     Occupational Exposure No     Hobby Hazards No     Sleep Concern No     Stress Concern Yes     Weight Concern No     Special Diet No     Back Care No     Exercise Yes     Bike Helmet No     Seat Belt Yes     Self-Exams No     Social History Narrative          Family History  Family History   Problem Relation Age of Onset     DIABETES Father      Arthritis Father      HEART DISEASE Father      Respiratory Father      Allergies Mother      Respiratory Mother      Asthma Brother      Allergies Brother      DIABETES Paternal Grandmother         ROS   The complete review of systems is negative other than noted in the HPI or here.   Constitutional: Denies  fevers/chills.    EENT: Denies difficulty swallowing.  Cardiovascular: Denies chest pain, MARQUES or orthopnea, palpitations or syncope.  Respiratory: Denies shortness of breath or significant cough.    GI: Denies nausea/vomiting     : Denies dysuria or urinary frequency  Musculoskeletal: Bilateral knee pain; Denies joint swelling.    Skin: Denies rashes or wounds.    Hematologic: Denies anemia or blood clot history  Neurologic: Intermittent arm numbness; Denies history of stroke, TIA, migraines, seizures, dizziness  Psychiatric: Denies changes in mood or affect.      Cardiology Tests: (personally reviewed):   Review Results Below in A/P    Labs: (personally reviewed):  Lab Results   Component Value Date    WBC 11.9  "(H) 10/25/2017    HGB 13.9 10/25/2017    HCT 44.3 10/25/2017     10/25/2017    INR 0.87 03/10/2014    PTT 25 03/07/2014     10/25/2017    POTASSIUM 4.1 10/25/2017    LAKE 8.7 10/25/2017    GLC 73 10/25/2017    CR 1.04 10/25/2017    BUN 16 10/25/2017    CO2 26 10/25/2017    ALT 68 10/25/2017    AST 35 10/25/2017    BILITOTAL 0.4 10/25/2017    ALKPHOS 158 (H) 10/25/2017           Physical Exam:  No LMP for male patient.   Vital signs:  /87  Pulse 76  Temp 97.9  F (36.6  C) (Oral)  Resp 20  Ht 1.702 m (5' 7\")  Wt 93.9 kg (207 lb 1.6 oz)  SpO2 98%  BMI 32.44 kg/m2    Constitutional: Awake, alert, cooperative, no apparent distress, and appears stated age.  Eyes: Pupils equal, round and reactive to light, sclera clear, conjunctiva normal.  HENT: Normocephalic, oral pharynx with moist mucus membranes. No goiter appreciated.   Respiratory: Clear to auscultation bilaterally, no crackles or wheezing.  Cardiovascular: Regular rate and rhythm, normal S1 and S2, and no overt murmur noted.  No carotid bruits auscultated No edema. Palpable pulses to radial  DP and PT arteries.   GI: Normal bowel sounds, soft, non-distended, non-tender, no masses palpated  Skin: Warm and dry.  No rashes at anticipated surgical site.   Musculoskeletal: Full ROM of neck. There is no redness, warmth, or swelling of the exposed joints. Gross motor strength is normal.    Neurologic: Awake, alert, oriented to name, place and time.  Gait is normal.   Neuropsychiatric: Calm, cooperative. Normal affect.     Assessment/Plan  Caesar Richardson is a 37 year old male who presents for pre-operative H & P in preparation for a Bilateral Knee uni compartmental arthroplasty on3/1/18  with Dr. Turner for Post-traumatic osteoarthritis of both knees at the [facility].   PAC referral for risk assessment and optimization for anesthesia with comorbid conditions of:    Pre-operative considerations:  1.  " Cardiac:  Functional status METS >4   Risk of Major Adverse Cardiac event: 0.4%  2.  Pulm:   LISA risk:  Low  -COPD mild-Mod   using Spiriva qd   , fluticasone-salmeterol qd   -Still smoking occasional cig, 25 pack year history  3.  Heme:  -CML s/p BMT 2006 and Cell Product Infusion 2012 in remission since this time  4.  GI:  Risk of PONV score =1 .  If > 2, anti-emetic intervention recommended.    Patient is optimized and is an acceptable candidate for the proposed procedure.  No further diagnostic evaluation is needed.      AVS given to patient regarding medication instructions,  surgery time/arrival time and NPO status.  Rin Boyd MS PA-C   Preoperative Assessment Center  [facility]  Cl[facility] and [facility] Center  Phone: [phone]  Fax: [phone]

## 2018-02-05 NOTE — ANESTHESIA PREPROCEDURE EVALUATION
Anesthesia Evaluation     . Pt has had prior anesthetic. Type: MAC    No history of anesthetic complications          ROS/MED HX    ENT/Pulmonary:     (+)tobacco use, Current use 25 pack year history, still a few cigs at times packs/day  COPD, , . .    Neurologic:     (+)neuropathy - intermittent numbness in both arms,     Cardiovascular:     (+) ----. : . . . :. . Previous cardiac testing date:results:date: results:ECG reviewed date:2013 results:Sinus Bradycardia HR 54 date: results:          METS/Exercise Tolerance:  >4 METS   Hematologic:  - neg hematologic  ROS       Musculoskeletal:   (+) , , other musculoskeletal- Chronic knee pain bilat      GI/Hepatic:     (+) GERD Asymptomatic on medication,       Renal/Genitourinary:  - ROS Renal section negative       Endo:  - neg endo ROS       Psychiatric:  - neg psychiatric ROS       Infectious Disease:  - neg infectious disease ROS       Malignancy:   (+) Malignancy History of Lymphoma/Leukemia  Lymph CA Remission status post Surgery,         Other:    (+) H/O Chronic Pain,                   Physical Exam  Normal systems: cardiovascular and pulmonary    Airway   Mallampati: I  TM distance: >3 FB  Neck ROM: full    Dental   (+) upper dentures  Comment: Removable partial on bottom    Cardiovascular   Rhythm and rate: regular and normal      Pulmonary    breath sounds clear to auscultation               PAC Discussion and Assessment    ASA Classification: 3  Case is suitable [place] [facility] [place]  Anesthetic techniques and relevant risks discussed: Regional  Invasive monitoring and risk discussed:   Types:   Possibility and Risk of blood transfusion discussed:   NPO instructions given:   Additional anesthetic preparation and risks discussed:   Needs early admission to pre-op area:   Other:     PAC Resident/NP Anesthesia Assessment:  Caesar Richardson is a 37 year old male who presents for pre-operative H & P in preparation for a Bilateral Knee uni compartmental arthroplasty  on3/1/18  with Dr. Turner for Post-traumatic osteoarthritis of both knees at the [facility].   PAC referral for risk assessment and optimization for anesthesia with comorbid conditions of:    Pre-operative considerations:  1.  Cardiac:  Functional status METS >4   Risk of Major Adverse Cardiac event: 0.4%  2.  Pulm:   LISA risk:  Low  -COPD mild-Mod   using Spiriva qd   , fluticasone-salmeterol qd   -Still smoking occasional cig, 25 pack year history  3.  Heme:  -CML s/p BMT 2006 and Cell Product Infusion 2012 in remission since this time  4.  GI:  Risk of PONV score =1 .  If > 2, anti-emetic intervention recommended.    Patient is optimized and is an acceptable candidate for the proposed procedure.  No further diagnostic evaluation is needed.    Patient discussed with Dr. Garibay.      Rin Boyd MS, PA-C  02/05/18 12:44 PM        Mid-Level Provider/Resident:   Date:   Time:     Attending Anesthesiologist Anesthesia Assessment:        Anesthesiologist:   Date:   Time:   Pass/Fail:   Disposition:     PAC Pharmacist Assessment:        Pharmacist:   Date:   Time:                           .

## 2018-02-05 NOTE — MR AVS SNAPSHOT
After Visit Summary   2018    Caesar Richardson    MRN: [id number]           Patient Information     Date Of Birth          1980        Visit Information        Provider Department      2018 12:30 PM Rn, [facility]        Care Instructions    Preparing for Your Surgery      Name:  Caesar Richardson   MRN:  [id number]   :  1980   Today's Date:  2018     Arriving for surgery:  Bilateral Knee Arthroplasty   Surgery date: 2018   Arrival time:  8:30 am  Please come to:     [facility] [address]  [address] [place], [place] [address]. [place]  [address]    - parking is available in front of [facility] from 5:15AM to 8:00PM. If you prefer, park your car in the Green Lot.    -Proceed to the 3rd floor, check in at the Adult Surgery Waiting Lounge. [phone]    If an escort is needed stop at the Information Desk in the lobby. Inform the information person that you are here for surgery. An escort to the Adult Surgery Waiting Lounge will be provided.        What can I eat or drink?  -  You may have solid food or milk products until 8 hours prior to your surgery.  Nothing after 3 am   -  You may have water, apple juice or 7up/Sprite until 2 hours prior to your surgery.  Until 8:30 am arrival time     Which medicines can I take?  -  Do NOT take these medications in the morning, the day of surgery:      Hold aspirin, vitamins and supplements for 1 week before surgery      Hold Ibuprofen for 24 hours before surgery       -  Please take these medications the day of surgery:       Inhalers as usual and bring to hospital       How do I prepare myself?  -  Take two showers: one the night before surgery; and one the morning of surgery.         Use Scrubcare or Hibiclens to wash from neck down.  You may use your own shampoo and conditioner. No other hair products.   -  Do NOT use lotion, powder, deodorant, or antiperspirant  the day of your surgery.  -  Do NOT wear any makeup, fingernail polish or jewelry.  -  Begin using Incentive Spirometer 1 week prior to surgery.  Use 4 times per day, up to 5 breaths each time.  Bring Incentive Spirometer to hospital.  -Do not bring your own medications to the hospital, except for inhalers and eye drops.  -  Bring your ID and insurance card.    Questions or Concerns:  If you have questions or concerns, please call the  Preoperative Assessment Center, Monday-Friday 7AM-7PM:  [phone]    Hospital Visiting Restrictions:   Due to the current measles outbreak, visitor restrictions are in place in the hospitals. No visitors under 5 are allowed to visit. Also, visitors who are unvaccinated for measles and those who are currently ill are also asked to refrain from visiting.  AFTER YOUR SURGERY  Breathing exercises   Breathing exercises help you recover faster. Take deep breaths and let the air out slowly. This will:     Help you wake up after surgery.    Help prevent complications like pneumonia.  Preventing complications will help you go home sooner.   We may give you a breathing device (incentive spirometer) to encourage you to breathe deeply.   Nausea and vomiting   You may feel sick to your stomach after surgery; if so, let your nurse know.    Pain control:  After surgery, you may have pain. Our goal is to help you manage your pain. Pain medicine will help you feel comfortable enough to do activities that will help you heal.  These activities may include breathing exercises, walking and physical therapy.   To help your health care team treat your pain we will ask: 1) If you have pain  2) where it is located 3) describe your pain in your words  Methods of pain control include medications given by mouth, vein or by nerve block for some surgeries.  We may give you a pain control pump that will:  1) Deliver the medicine through a tube placed in your vein  2) Control the amount of medicine you receive   3) Allow you to push a button to deliver a dose of pain medicine  Sequential Compression Device (SCD) or Pneumo Boots:  You may need to wear SCD S on your legs or feet. These are wraps connected to a machine that pumps in air and releases it. The repeated pumping helps prevent blood clots from forming   Enhanced Recovery After Surgery     This is a team effort, including you, to get you back on your feet, eating and drinking normally and out of the hospital as quickly as possible.  The goals are:   1) NO INFECTIONS and   2) RETURN TO NORMAL DIET    How can we achieve these goals?  1) STAY ACTIVE: Walk every day before your surgery; try to increase the amount every day.  Walk after surgery as much as you can-the nurses will help you.  Walking speeds healing and gets you home quicker, you heal better at home and have less risk of infection.     2) INCENTIVE SPIROMETER: Practice your incentive spirometer 4 times per day with 5-10 repetitions each time.  Using the incentive spirometer can strengthen your muscles between your ribs and help you have a strong cough after surgery.  A more effective cough can help prevent problems with your lungs.    3) STAY HYDRATED: Drink clear liquids up until 2 hours before your surgery. We would like you to purchase a drink such as Gatorade or Ensure Clear (not the milkshake type).  Drink this before bedtime and on the way into the hospital, drink between 8-12 ounces or until you feel hydrated.  Keeping well hydrated leads to your veins being plump, you wake up faster, and you are less likely to be nauseated. Start drinking water as soon as you can after surgery and advance to clear liquids and food as tolerated.  IV fluids contain salt, drinking fluids will minimize the amount of IV fluids you need and decrease the amount of salt you get.    The most common reason for the patient to be readmitted is dehydration. Staying hydrated after you go home from the hospital is very important.   Ensure or Ensure Clear are good options to keep you hydrated.     4) PAIN MANAGEMENT: If we minimize the amount of opioids and narcotics, and use regional blocks (which numb the area where your surgery is) along with oral pain medications; you will have less side effects of nausea and constipation. Narcotics can slow down your bowels and cause you to stay in the hospital longer.     Our goal is to keep you comfortable; eating and drinking normally and back home safely.   Using an Incentive Spirometer    An incentive spirometer is a device that helps you do deep breathing exercises. These exercises expand your lungs, aid in circulation, and help prevent pneumonia. Deep breathing exercises also help you breathe better and improve the function of your lungs by:    Keeping your lungs clear    Strengthening your breathing muscles    Helping prevent respiratory complications or problems  The incentive spirometer gives you a way to take an active part in your care. A nurse or therapist will teach you breathing exercises. To do these exercises, you will breathe in through your mouth and not your nose. The incentive spirometer only works correctly if you breathe in through your mouth.    Steps to clear lungs  Step 1. Exhale normally. Then, inhale normally.    Relax and breathe out.  Step 2. Place your lips tightly around the mouthpiece.    Make sure the device is upright and not tilted.  Step 3. Inhale as much air as you can through the mouthpiece (don't breath through your nose).    Inhale slowly and deeply.    Hold your breath long enough to keep the balls or disk raised for at least 3 to 5 seconds, or as instructed by your healthcare provider.  Step 4. Repeat the exercise regularly.  Begin using the Incentive Spirometer one week prior to your surgery, 4 times per day-5 times each.          Follow-ups after your visit        Your next 10 appointments already scheduled     Feb 05, 2018  1:00 PM CST   (Arrive by 12:45 PM)    PAC Anesthesia Consult with  Pac Anesthesiologist   [facility] ([facility])    [address]  4th Floor  [facility] [address]   [phone]            Feb 05, 2018  1:15 PM CST   LAB with  LAB   [facility] ([facility])    [address]  1st Floor  [facility] [address]   [phone]           Please do not eat 10-12 hours before your appointment if you are coming in fasting for labs on lipids, cholesterol, or glucose (sugar). This does not apply to pregnant women. Water, hot tea and black coffee (with nothing added) are okay. Do not drink other fluids, diet soda or chew gum.            Mar 01, 2018   Procedure with Pradeep Turner MD   [facility], [place], Same Day Surgery (--)    [address] [address]   [phone]            Apr 09, 2018 11:15 AM CDT   (Arrive by 11:00 AM)   RETURN KNEE with Pradeep Turner MD   [facility] ([facility])    [address]  4th [facility] [address]   [phone]            Apr 11, 2018 10:30 AM CDT   [facility] Lab Draw with  [facility] LAB DRAW   [facility] ([facility])    [address] [place] [address]  [address]  [facility] [address] [phone]            Apr 11, 2018 11:00 AM CDT   Return with  BMT DOM   [facility] ([facility])    [address] [place] [address]  [address]  [facility] [address] [phone]              Who to contact     Please call your clinic at [phone] to:    Ask questions about your health    Make or cancel appointments    Discuss your medicines    Learn about your test results    Speak to your doctor   If you have compliments or concerns about an experience at your clinic, or if you wish to file a complaint, please contact [facility] Patient Relations at  [phone] or email us at [email or web address]         Additional Information About Your Visit        [email or web address] Information     [employer] gives you secure access to your electronic health record. If you see a primary care provider, you can also send messages to your care team and make appointments. If you have questions, please call your primary care clinic.  If you do not have a primary care provider, please call [phone] and they will assist you.      [employer] is an electronic gateway that provides easy, online access to your medical records. With [employer], you can request a clinic appointment, read your test results, renew a prescription or communicate with your care team.     To access your existing account, please contact your [facility] or call [phone] for assistance.        Care EveryWhere ID     This is your Care EveryWhere ID. This could be used by other organizations to access your Center medical records  [id number]         Blood Pressure from Last 3 Encounters:   02/05/18 133/87   10/25/17 113/70   07/26/17 140/88    Weight from Last 3 Encounters:   02/05/18 93.9 kg (207 lb 1.6 oz)   10/25/17 92.4 kg (203 lb 9.6 oz)   09/14/17 81.6 kg (180 lb)              Today, you had the following     No orders found for display         Today's Medication Changes          These changes are accurate as of 2/5/18 12:30 PM.  If you have any questions, ask your nurse or doctor.               These medicines have changed or have updated prescriptions.        Dose/Directions    traZODone 150 MG tablet   Commonly known as:  DESYREL   This may have changed:    - how much to take  - when to take this   Used for:  Anxiety, CML (chronic myelocytic leukemia) (H), Logay-rduktd-irfk disease, unspecified, Panlobular emphysema (H), Heartburn, Cough, Erectile dysfunction, unspecified erectile dysfunction type, Status post allogeneic bone marrow transplant (H), Diminished libido         Dose:  150 mg   Take 1 tablet (150 mg) by mouth At Bedtime   Quantity:  90 tablet   Refills:  6                Primary Care Provider Office Phone # Fax #    Sebas Leon [phone]       [address] [place] [address]        Equal Access to Services     ADALID CLEMENS : Hadnorma tyler hadariannao Soomaali, waaxda luqadaha, qaybta kaalmada adeegyada, waxiris de la cruz brianedwardo streetgonzalesdelmy vicente. So [facility] [phone].    ATENCIÓN: Si habla español, tiene a st disposición servicios gratuitos de asistencia lingüística. [facility] [phone].    We comply with applicable federal civil rights laws and [place] laws. We do not discriminate on the basis of race, color, national origin, age, disability, sex, sexual orientation, or gender identity.            Thank you!     Thank you for choosing [facility]OPERATIVE [facility]MENT [place]  for your care. Our goal is always to provide you with excellent care. Hearing back from our patients is one way we can continue to improve our services. Please take a few minutes to complete the written survey that you may receive in the mail after your visit with us. Thank you!             Your Updated Medication List - Protect others around you: Learn how to safely use, store and throw away your medicines at www.disposemymeds.org.          This list is accurate as of 2/5/18 12:30 PM.  Always use your most recent med list.                   Brand Name Dispense Instructions for use Diagnosis    diclofenac 1 % Gel topical gel    VOLTAREN    2 Tube    Apply topically 4 times daily    CML (chronic myelocytic leukemia) (H), Panlobular emphysema (H), Anxiety, Cough, Status post allogeneic bone marrow transplant (H), Ltqwh-sahnbj-lihe disease, unspecified, Diminished libido, Erectile dysfunction, unspecified erectile dysfunction type, Heartburn       fluticasone-salmeterol 250-50 MCG/DOSE diskus inhaler    ADVAIR    1 Inhaler    Inhale 1 puff into the lungs 2  times daily    Panlobular emphysema (H), CML (chronic myelocytic leukemia) (H), Anxiety, Cough, Status post allogeneic bone marrow transplant (H), Jlczh-jlmyba-yepc disease, unspecified, Diminished libido, Erectile dysfunction, unspecified erectile dysfunction type, Heartburn       tiotropium 18 MCG capsule    SPIRIVA HANDIHALER    30 capsule    Inhale contents of one capsule daily.    Panlobular emphysema (H), CML (chronic myelocytic leukemia) (H), Anxiety, Cough, Status post allogeneic bone marrow transplant (H), Nytdb-uuxdoh-tvee disease, unspecified, Diminished libido, Erectile dysfunction, unspecified erectile dysfunction type, Heartburn       traZODone 150 MG tablet    DESYREL    90 tablet    Take 1 tablet (150 mg) by mouth At Bedtime    Anxiety, CML (chronic myelocytic leukemia) (H), Jnpxp-kvayqm-oqdj disease, unspecified, Panlobular emphysema (H), Heartburn, Cough, Erectile dysfunction, unspecified erectile dysfunction type, Status post allogeneic bone marrow transplant (H), Diminished libido

## 2018-02-28 ENCOUNTER — ANESTHESIA EVENT (OUTPATIENT)
Dept: SURGERY | Facility: CLINIC | Age: 38
DRG: 462 | End: 2018-02-28
Payer: MEDICARE

## 2018-03-01 ENCOUNTER — HOSPITAL ENCOUNTER (INPATIENT)
Facility: CLINIC | Age: 38
LOS: 1 days | Discharge: HOME | DRG: 462 | End: 2018-03-03
Attending: ORTHOPAEDIC SURGERY | Admitting: ORTHOPAEDIC SURGERY
Payer: MEDICARE

## 2018-03-01 ENCOUNTER — APPOINTMENT (OUTPATIENT)
Dept: GENERAL RADIOLOGY | Facility: CLINIC | Age: 38
DRG: 462 | End: 2018-03-01
Attending: ORTHOPAEDIC SURGERY
Payer: MEDICARE

## 2018-03-01 ENCOUNTER — ANESTHESIA (OUTPATIENT)
Dept: SURGERY | Facility: CLINIC | Age: 38
DRG: 462 | End: 2018-03-01
Payer: MEDICARE

## 2018-03-01 DIAGNOSIS — R52 PAIN: Primary | ICD-10-CM

## 2018-03-01 DIAGNOSIS — Z96.653 S/P BILATERAL UNICOMPARTMENTAL KNEE REPLACEMENT: ICD-10-CM

## 2018-03-01 DIAGNOSIS — Z96.653 STATUS POST TOTAL BILATERAL KNEE REPLACEMENT: ICD-10-CM

## 2018-03-01 PROBLEM — Z96.659 S/P TOTAL KNEE ARTHROPLASTY: Status: ACTIVE | Noted: 2018-03-01

## 2018-03-01 LAB
ALBUMIN UR-MCNC: NEGATIVE MG/DL
APPEARANCE UR: CLEAR
BILIRUB UR QL STRIP: NEGATIVE
COLOR UR AUTO: YELLOW
GLUCOSE BLDC GLUCOMTR-MCNC: 139 MG/DL (ref 70–99)
GLUCOSE UR STRIP-MCNC: NEGATIVE MG/DL
HGB UR QL STRIP: ABNORMAL
KETONES UR STRIP-MCNC: NEGATIVE MG/DL
LEUKOCYTE ESTERASE UR QL STRIP: NEGATIVE
MUCOUS THREADS #/AREA URNS LPF: PRESENT /LPF
NITRATE UR QL: NEGATIVE
PH UR STRIP: 5.5 PH (ref 5–7)
RBC #/AREA URNS AUTO: 1 /HPF (ref 0–2)
SOURCE: ABNORMAL
SP GR UR STRIP: 1.02 (ref 1–1.03)
SQUAMOUS #/AREA URNS AUTO: <1 /HPF (ref 0–1)
UROBILINOGEN UR STRIP-MCNC: NORMAL MG/DL (ref 0–2)
WBC #/AREA URNS AUTO: 1 /HPF (ref 0–5)

## 2018-03-01 PROCEDURE — 25000128 H RX IP 250 OP 636: Performed by: ORTHOPAEDIC SURGERY

## 2018-03-01 PROCEDURE — 99207 ZZC CONSULT E&M CHANGED TO INITIAL LEVEL: CPT | Performed by: PHYSICIAN ASSISTANT

## 2018-03-01 PROCEDURE — 36000062 ZZH SURGERY LEVEL 4 1ST 30 MIN - UMMC: Performed by: ORTHOPAEDIC SURGERY

## 2018-03-01 PROCEDURE — 37000008 ZZH ANESTHESIA TECHNICAL FEE, 1ST 30 MIN: Performed by: ORTHOPAEDIC SURGERY

## 2018-03-01 PROCEDURE — 37000009 ZZH ANESTHESIA TECHNICAL FEE, EACH ADDTL 15 MIN: Performed by: ORTHOPAEDIC SURGERY

## 2018-03-01 PROCEDURE — 71000014 ZZH RECOVERY PHASE 1 LEVEL 2 FIRST HR: Performed by: ORTHOPAEDIC SURGERY

## 2018-03-01 PROCEDURE — C9290 INJ, BUPIVACAINE LIPOSOME: HCPCS | Performed by: ANESTHESIOLOGY

## 2018-03-01 PROCEDURE — 12000001 ZZH R&B MED SURG/OB UMMC

## 2018-03-01 PROCEDURE — 99221 1ST HOSP IP/OBS SF/LOW 40: CPT | Performed by: PHYSICIAN ASSISTANT

## 2018-03-01 PROCEDURE — 0SRD0L9 REPLACEMENT OF LEFT KNEE JOINT WITH MEDIAL UNICONDYLAR SYNTHETIC SUBSTITUTE, CEMENTED, OPEN APPROACH: ICD-10-PCS | Performed by: ORTHOPAEDIC SURGERY

## 2018-03-01 PROCEDURE — 40000171 ZZH STATISTIC PRE-PROCEDURE ASSESSMENT III: Performed by: ORTHOPAEDIC SURGERY

## 2018-03-01 PROCEDURE — C1776 JOINT DEVICE (IMPLANTABLE): HCPCS | Performed by: ORTHOPAEDIC SURGERY

## 2018-03-01 PROCEDURE — 25000132 ZZH RX MED GY IP 250 OP 250 PS 637: Mod: GY | Performed by: PHYSICIAN ASSISTANT

## 2018-03-01 PROCEDURE — 25800025 ZZH RX 258: Performed by: ORTHOPAEDIC SURGERY

## 2018-03-01 PROCEDURE — 25000125 ZZHC RX 250: Performed by: ANESTHESIOLOGY

## 2018-03-01 PROCEDURE — 25000125 ZZHC RX 250: Performed by: ORTHOPAEDIC SURGERY

## 2018-03-01 PROCEDURE — 81001 URINALYSIS AUTO W/SCOPE: CPT | Performed by: ORTHOPAEDIC SURGERY

## 2018-03-01 PROCEDURE — A9270 NON-COVERED ITEM OR SERVICE: HCPCS | Mod: GY | Performed by: ORTHOPAEDIC SURGERY

## 2018-03-01 PROCEDURE — 27810169 ZZH OR IMPLANT GENERAL: Performed by: ORTHOPAEDIC SURGERY

## 2018-03-01 PROCEDURE — 40000986: Mod: 50

## 2018-03-01 PROCEDURE — C1713 ANCHOR/SCREW BN/BN,TIS/BN: HCPCS | Performed by: ORTHOPAEDIC SURGERY

## 2018-03-01 PROCEDURE — 25000128 H RX IP 250 OP 636: Performed by: ANESTHESIOLOGY

## 2018-03-01 PROCEDURE — 36000064 ZZH SURGERY LEVEL 4 EA 15 ADDTL MIN - UMMC: Performed by: ORTHOPAEDIC SURGERY

## 2018-03-01 PROCEDURE — 00000146 ZZHCL STATISTIC GLUCOSE BY METER IP

## 2018-03-01 PROCEDURE — 25000128 H RX IP 250 OP 636: Performed by: NURSE ANESTHETIST, CERTIFIED REGISTERED

## 2018-03-01 PROCEDURE — 0SRC0L9 REPLACEMENT OF RIGHT KNEE JOINT WITH MEDIAL UNICONDYLAR SYNTHETIC SUBSTITUTE, CEMENTED, OPEN APPROACH: ICD-10-PCS | Performed by: ORTHOPAEDIC SURGERY

## 2018-03-01 PROCEDURE — A9270 NON-COVERED ITEM OR SERVICE: HCPCS | Mod: GY | Performed by: PHYSICIAN ASSISTANT

## 2018-03-01 PROCEDURE — 25000132 ZZH RX MED GY IP 250 OP 250 PS 637: Mod: GY | Performed by: ORTHOPAEDIC SURGERY

## 2018-03-01 PROCEDURE — 27210995 ZZH RX 272: Performed by: ORTHOPAEDIC SURGERY

## 2018-03-01 PROCEDURE — 25000125 ZZHC RX 250: Performed by: NURSE ANESTHETIST, CERTIFIED REGISTERED

## 2018-03-01 PROCEDURE — 27210794 ZZH OR GENERAL SUPPLY STERILE: Performed by: ORTHOPAEDIC SURGERY

## 2018-03-01 DEVICE — IMPLANTABLE DEVICE: Type: IMPLANTABLE DEVICE | Site: KNEE | Status: FUNCTIONAL

## 2018-03-01 DEVICE — BONE CEMENT SIMPLEX W/TOBRAMYCIN 6197-9-001: Type: IMPLANTABLE DEVICE | Site: KNEE | Status: FUNCTIONAL

## 2018-03-01 RX ORDER — CEFAZOLIN SODIUM 2 G/100ML
2 INJECTION, SOLUTION INTRAVENOUS
Status: COMPLETED | OUTPATIENT
Start: 2018-03-01 | End: 2018-03-01

## 2018-03-01 RX ORDER — BUPIVACAINE HYDROCHLORIDE AND EPINEPHRINE 2.5; 5 MG/ML; UG/ML
INJECTION, SOLUTION INFILTRATION; PERINEURAL PRN
Status: DISCONTINUED | OUTPATIENT
Start: 2018-03-01 | End: 2018-03-01 | Stop reason: HOSPADM

## 2018-03-01 RX ORDER — ONDANSETRON 2 MG/ML
INJECTION INTRAMUSCULAR; INTRAVENOUS PRN
Status: DISCONTINUED | OUTPATIENT
Start: 2018-03-01 | End: 2018-03-01

## 2018-03-01 RX ORDER — MAGNESIUM HYDROXIDE 1200 MG/15ML
LIQUID ORAL PRN
Status: DISCONTINUED | OUTPATIENT
Start: 2018-03-01 | End: 2018-03-01 | Stop reason: HOSPADM

## 2018-03-01 RX ORDER — KETOROLAC TROMETHAMINE 30 MG/ML
INJECTION, SOLUTION INTRAMUSCULAR; INTRAVENOUS PRN
Status: DISCONTINUED | OUTPATIENT
Start: 2018-03-01 | End: 2018-03-01

## 2018-03-01 RX ORDER — CEFAZOLIN SODIUM 2 G/100ML
2 INJECTION, SOLUTION INTRAVENOUS EVERY 8 HOURS
Status: COMPLETED | OUTPATIENT
Start: 2018-03-01 | End: 2018-03-02

## 2018-03-01 RX ORDER — AMOXICILLIN 250 MG
2 CAPSULE ORAL 2 TIMES DAILY PRN
Status: DISCONTINUED | OUTPATIENT
Start: 2018-03-01 | End: 2018-03-03 | Stop reason: HOSPADM

## 2018-03-01 RX ORDER — NALOXONE HYDROCHLORIDE 0.4 MG/ML
.1-.4 INJECTION, SOLUTION INTRAMUSCULAR; INTRAVENOUS; SUBCUTANEOUS
Status: DISCONTINUED | OUTPATIENT
Start: 2018-03-01 | End: 2018-03-01

## 2018-03-01 RX ORDER — HYDROXYZINE HYDROCHLORIDE 25 MG/1
25 TABLET, FILM COATED ORAL EVERY 6 HOURS PRN
Status: DISCONTINUED | OUTPATIENT
Start: 2018-03-01 | End: 2018-03-03 | Stop reason: HOSPADM

## 2018-03-01 RX ORDER — SODIUM CHLORIDE, SODIUM LACTATE, POTASSIUM CHLORIDE, CALCIUM CHLORIDE 600; 310; 30; 20 MG/100ML; MG/100ML; MG/100ML; MG/100ML
INJECTION, SOLUTION INTRAVENOUS CONTINUOUS
Status: DISCONTINUED | OUTPATIENT
Start: 2018-03-01 | End: 2018-03-01 | Stop reason: HOSPADM

## 2018-03-01 RX ORDER — OXYCODONE HYDROCHLORIDE 5 MG/1
5-10 TABLET ORAL
Status: DISCONTINUED | OUTPATIENT
Start: 2018-03-01 | End: 2018-03-03 | Stop reason: HOSPADM

## 2018-03-01 RX ORDER — ONDANSETRON 2 MG/ML
4 INJECTION INTRAMUSCULAR; INTRAVENOUS EVERY 6 HOURS PRN
Status: DISCONTINUED | OUTPATIENT
Start: 2018-03-01 | End: 2018-03-03 | Stop reason: HOSPADM

## 2018-03-01 RX ORDER — ACETAMINOPHEN 325 MG/1
975 TABLET ORAL ONCE
Status: COMPLETED | OUTPATIENT
Start: 2018-03-01 | End: 2018-03-01

## 2018-03-01 RX ORDER — LIDOCAINE HYDROCHLORIDE 20 MG/ML
INJECTION, SOLUTION INFILTRATION; PERINEURAL PRN
Status: DISCONTINUED | OUTPATIENT
Start: 2018-03-01 | End: 2018-03-01

## 2018-03-01 RX ORDER — LIDOCAINE 40 MG/G
CREAM TOPICAL
Status: DISCONTINUED | OUTPATIENT
Start: 2018-03-01 | End: 2018-03-03 | Stop reason: HOSPADM

## 2018-03-01 RX ORDER — CEFAZOLIN SODIUM 1 G/3ML
1 INJECTION, POWDER, FOR SOLUTION INTRAMUSCULAR; INTRAVENOUS SEE ADMIN INSTRUCTIONS
Status: DISCONTINUED | OUTPATIENT
Start: 2018-03-01 | End: 2018-03-01 | Stop reason: HOSPADM

## 2018-03-01 RX ORDER — BUPIVACAINE HYDROCHLORIDE 7.5 MG/ML
INJECTION, SOLUTION INTRASPINAL PRN
Status: DISCONTINUED | OUTPATIENT
Start: 2018-03-01 | End: 2018-03-01

## 2018-03-01 RX ORDER — ONDANSETRON 2 MG/ML
4 INJECTION INTRAMUSCULAR; INTRAVENOUS EVERY 30 MIN PRN
Status: DISCONTINUED | OUTPATIENT
Start: 2018-03-01 | End: 2018-03-01 | Stop reason: HOSPADM

## 2018-03-01 RX ORDER — HYDROMORPHONE HYDROCHLORIDE 1 MG/ML
.3-.5 INJECTION, SOLUTION INTRAMUSCULAR; INTRAVENOUS; SUBCUTANEOUS
Status: DISCONTINUED | OUTPATIENT
Start: 2018-03-01 | End: 2018-03-03 | Stop reason: HOSPADM

## 2018-03-01 RX ORDER — FENTANYL CITRATE 50 UG/ML
INJECTION, SOLUTION INTRAMUSCULAR; INTRAVENOUS PRN
Status: DISCONTINUED | OUTPATIENT
Start: 2018-03-01 | End: 2018-03-01

## 2018-03-01 RX ORDER — PROPOFOL 10 MG/ML
INJECTION, EMULSION INTRAVENOUS CONTINUOUS PRN
Status: DISCONTINUED | OUTPATIENT
Start: 2018-03-01 | End: 2018-03-01

## 2018-03-01 RX ORDER — FENTANYL CITRATE 50 UG/ML
25-50 INJECTION, SOLUTION INTRAMUSCULAR; INTRAVENOUS
Status: DISCONTINUED | OUTPATIENT
Start: 2018-03-01 | End: 2018-03-01

## 2018-03-01 RX ORDER — FLUMAZENIL 0.1 MG/ML
0.2 INJECTION, SOLUTION INTRAVENOUS
Status: DISCONTINUED | OUTPATIENT
Start: 2018-03-01 | End: 2018-03-01 | Stop reason: HOSPADM

## 2018-03-01 RX ORDER — SODIUM CHLORIDE, SODIUM LACTATE, POTASSIUM CHLORIDE, CALCIUM CHLORIDE 600; 310; 30; 20 MG/100ML; MG/100ML; MG/100ML; MG/100ML
INJECTION, SOLUTION INTRAVENOUS CONTINUOUS
Status: DISCONTINUED | OUTPATIENT
Start: 2018-03-01 | End: 2018-03-01

## 2018-03-01 RX ORDER — EPHEDRINE SULFATE 50 MG/ML
INJECTION, SOLUTION INTRAVENOUS PRN
Status: DISCONTINUED | OUTPATIENT
Start: 2018-03-01 | End: 2018-03-01

## 2018-03-01 RX ORDER — ONDANSETRON 4 MG/1
4 TABLET, ORALLY DISINTEGRATING ORAL EVERY 30 MIN PRN
Status: DISCONTINUED | OUTPATIENT
Start: 2018-03-01 | End: 2018-03-01 | Stop reason: HOSPADM

## 2018-03-01 RX ORDER — ACETAMINOPHEN 325 MG/1
650 TABLET ORAL EVERY 4 HOURS PRN
Status: DISCONTINUED | OUTPATIENT
Start: 2018-03-04 | End: 2018-03-03 | Stop reason: HOSPADM

## 2018-03-01 RX ORDER — NALOXONE HYDROCHLORIDE 0.4 MG/ML
.1-.4 INJECTION, SOLUTION INTRAMUSCULAR; INTRAVENOUS; SUBCUTANEOUS
Status: DISCONTINUED | OUTPATIENT
Start: 2018-03-01 | End: 2018-03-01 | Stop reason: HOSPADM

## 2018-03-01 RX ORDER — AMOXICILLIN 250 MG
1 CAPSULE ORAL 2 TIMES DAILY PRN
Status: DISCONTINUED | OUTPATIENT
Start: 2018-03-01 | End: 2018-03-03 | Stop reason: HOSPADM

## 2018-03-01 RX ORDER — DEXAMETHASONE SODIUM PHOSPHATE 4 MG/ML
INJECTION, SOLUTION INTRA-ARTICULAR; INTRALESIONAL; INTRAMUSCULAR; INTRAVENOUS; SOFT TISSUE PRN
Status: DISCONTINUED | OUTPATIENT
Start: 2018-03-01 | End: 2018-03-01

## 2018-03-01 RX ORDER — BISACODYL 10 MG
10 SUPPOSITORY, RECTAL RECTAL DAILY
Status: DISCONTINUED | OUTPATIENT
Start: 2018-03-02 | End: 2018-03-03 | Stop reason: HOSPADM

## 2018-03-01 RX ORDER — MEPERIDINE HYDROCHLORIDE 25 MG/ML
12.5 INJECTION INTRAMUSCULAR; INTRAVENOUS; SUBCUTANEOUS EVERY 5 MIN PRN
Status: DISCONTINUED | OUTPATIENT
Start: 2018-03-01 | End: 2018-03-01 | Stop reason: HOSPADM

## 2018-03-01 RX ORDER — NALOXONE HYDROCHLORIDE 0.4 MG/ML
.1-.4 INJECTION, SOLUTION INTRAMUSCULAR; INTRAVENOUS; SUBCUTANEOUS
Status: DISCONTINUED | OUTPATIENT
Start: 2018-03-01 | End: 2018-03-03 | Stop reason: HOSPADM

## 2018-03-01 RX ORDER — ASPIRIN 325 MG/1
325 TABLET, FILM COATED ORAL DAILY
Status: DISCONTINUED | OUTPATIENT
Start: 2018-03-01 | End: 2018-03-01 | Stop reason: HOSPADM

## 2018-03-01 RX ORDER — ONDANSETRON 4 MG/1
4 TABLET, ORALLY DISINTEGRATING ORAL EVERY 6 HOURS PRN
Status: DISCONTINUED | OUTPATIENT
Start: 2018-03-01 | End: 2018-03-03 | Stop reason: HOSPADM

## 2018-03-01 RX ORDER — SODIUM CHLORIDE, SODIUM LACTATE, POTASSIUM CHLORIDE, CALCIUM CHLORIDE 600; 310; 30; 20 MG/100ML; MG/100ML; MG/100ML; MG/100ML
INJECTION, SOLUTION INTRAVENOUS CONTINUOUS PRN
Status: DISCONTINUED | OUTPATIENT
Start: 2018-03-01 | End: 2018-03-01

## 2018-03-01 RX ORDER — FENTANYL CITRATE 50 UG/ML
25-50 INJECTION, SOLUTION INTRAMUSCULAR; INTRAVENOUS
Status: DISCONTINUED | OUTPATIENT
Start: 2018-03-01 | End: 2018-03-01 | Stop reason: HOSPADM

## 2018-03-01 RX ORDER — PROPOFOL 10 MG/ML
INJECTION, EMULSION INTRAVENOUS PRN
Status: DISCONTINUED | OUTPATIENT
Start: 2018-03-01 | End: 2018-03-01

## 2018-03-01 RX ORDER — NICOTINE 21 MG/24HR
1 PATCH, TRANSDERMAL 24 HOURS TRANSDERMAL DAILY
Status: DISCONTINUED | OUTPATIENT
Start: 2018-03-01 | End: 2018-03-03 | Stop reason: HOSPADM

## 2018-03-01 RX ORDER — GABAPENTIN 100 MG/1
300 CAPSULE ORAL
Status: COMPLETED | OUTPATIENT
Start: 2018-03-01 | End: 2018-03-01

## 2018-03-01 RX ADMIN — OXYCODONE HYDROCHLORIDE 5 MG: 5 TABLET ORAL at 22:20

## 2018-03-01 RX ADMIN — FENTANYL CITRATE 50 MCG: 50 INJECTION, SOLUTION INTRAMUSCULAR; INTRAVENOUS at 10:35

## 2018-03-01 RX ADMIN — BUPIVACAINE 20 ML: 13.3 INJECTION, SUSPENSION, LIPOSOMAL INFILTRATION at 10:19

## 2018-03-01 RX ADMIN — NICOTINE 1 PATCH: 14 PATCH, EXTENDED RELEASE TRANSDERMAL at 16:40

## 2018-03-01 RX ADMIN — MIDAZOLAM 2 MG: 1 INJECTION INTRAMUSCULAR; INTRAVENOUS at 10:28

## 2018-03-01 RX ADMIN — PROPOFOL 20 MG: 10 INJECTION, EMULSION INTRAVENOUS at 12:19

## 2018-03-01 RX ADMIN — SODIUM CHLORIDE 1 G: 9 INJECTION, SOLUTION INTRAVENOUS at 10:45

## 2018-03-01 RX ADMIN — OXYCODONE HYDROCHLORIDE 5 MG: 5 TABLET ORAL at 15:56

## 2018-03-01 RX ADMIN — CEFAZOLIN SODIUM 2 G: 2 INJECTION, SOLUTION INTRAVENOUS at 18:57

## 2018-03-01 RX ADMIN — PHENYLEPHRINE HYDROCHLORIDE 100 MCG: 10 INJECTION, SOLUTION INTRAMUSCULAR; INTRAVENOUS; SUBCUTANEOUS at 11:22

## 2018-03-01 RX ADMIN — SODIUM CHLORIDE, POTASSIUM CHLORIDE, SODIUM LACTATE AND CALCIUM CHLORIDE: 600; 310; 30; 20 INJECTION, SOLUTION INTRAVENOUS at 10:28

## 2018-03-01 RX ADMIN — PROPOFOL 30 MG: 10 INJECTION, EMULSION INTRAVENOUS at 10:41

## 2018-03-01 RX ADMIN — BUPIVACAINE HYDROCHLORIDE IN DEXTROSE 2 ML: 7.5 INJECTION, SOLUTION SUBARACHNOID at 10:45

## 2018-03-01 RX ADMIN — LIDOCAINE HYDROCHLORIDE 60 MG: 20 INJECTION, SOLUTION INFILTRATION; PERINEURAL at 10:41

## 2018-03-01 RX ADMIN — HYDROXYZINE HYDROCHLORIDE 25 MG: 25 TABLET ORAL at 19:15

## 2018-03-01 RX ADMIN — PROPOFOL 75 MCG/KG/MIN: 10 INJECTION, EMULSION INTRAVENOUS at 10:41

## 2018-03-01 RX ADMIN — ONDANSETRON 4 MG: 2 INJECTION INTRAMUSCULAR; INTRAVENOUS at 12:33

## 2018-03-01 RX ADMIN — CEFAZOLIN SODIUM 2 G: 2 INJECTION, SOLUTION INTRAVENOUS at 10:52

## 2018-03-01 RX ADMIN — PHENYLEPHRINE HYDROCHLORIDE 100 MCG: 10 INJECTION, SOLUTION INTRAMUSCULAR; INTRAVENOUS; SUBCUTANEOUS at 11:48

## 2018-03-01 RX ADMIN — GABAPENTIN 300 MG: 300 CAPSULE ORAL at 09:27

## 2018-03-01 RX ADMIN — DEXAMETHASONE SODIUM PHOSPHATE 8 MG: 4 INJECTION, SOLUTION INTRAMUSCULAR; INTRAVENOUS at 10:41

## 2018-03-01 RX ADMIN — OXYCODONE HYDROCHLORIDE 5 MG: 5 TABLET ORAL at 19:15

## 2018-03-01 RX ADMIN — KETOROLAC TROMETHAMINE 15 MG: 30 INJECTION, SOLUTION INTRAMUSCULAR at 12:36

## 2018-03-01 RX ADMIN — ACETAMINOPHEN 975 MG: 325 TABLET, FILM COATED ORAL at 09:26

## 2018-03-01 RX ADMIN — EPHEDRINE SULFATE 5 MG: 50 INJECTION, SOLUTION INTRAVENOUS at 11:00

## 2018-03-01 RX ADMIN — PROPOFOL 20 MG: 10 INJECTION, EMULSION INTRAVENOUS at 12:24

## 2018-03-01 ASSESSMENT — LIFESTYLE VARIABLES: TOBACCO_USE: 1

## 2018-03-01 ASSESSMENT — COPD QUESTIONNAIRES
COPD: 1
CAT_SEVERITY: MILD

## 2018-03-01 NOTE — OR NURSING
PACU to Inpatient Nursing Handoff    Patient Caesar Richardson is a 37 year old male who speaks English.   Procedure Procedure(s):  Bilateral Knee Uni Compartmental Arthroplasty - Wound Class: I-Clean   Surgeon(s) Primary: Pradeep Turner MD  Assisting: Ronaldo Morse PA-C     Allergies   Allergen Reactions     No Known Allergies        Isolation      Past Medical History   has a past medical history of Arthritis; BMT (bone marrow transplant) (2006); CML (chronic myelocytic leukemia) (H); COPD (chronic obstructive pulmonary disease) (H) (1/9/2013); GERD (gastroesophageal reflux disease); Bciep-woniew-kztq disease (H) (7/21/2011); and Rheumatoid arthritis (H) (7/21/2011). He also has no past medical history of Atypical fibroxanthoma; Basal cell carcinoma; Cutaneous T-cell lymphoma (H); Malignant melanoma (H); Other specified malignant neoplasm of skin of trunk, except scrotum; or Squamous cell carcinoma.    Anesthesia Spinal   Dermatome Level     Preop Meds acetaminophen (Tylenol) - time given: 975, gabapentin   Nerve block Adductor canal.  Location:bilateral. Med:Exparel (liposomal bupivacaine). Time given: 1021   Intraop Meds dexamethasone (Decadron)  fentanyl (Sublimaze): 50 mcg total  ketorolac (Toradol): last given at 1236  ondansetron (Zofran): last given at 1233   Local Meds Yes   Antibiotics cefazolin (Ancef) - last given at 1052     Pain Patient Currently in Pain: denies  Comfort: tolerable with discomfort  Pain Control: fully effective   PACU meds  Not applicable   PCA / epidural No   Capnography     Telemetry ECG Rhythm: Normal sinus rhythm      Labs Glucose Lab Results   Component Value Date    GLC 73 10/25/2017       Hgb Lab Results   Component Value Date    HGB 13.9 10/25/2017       INR Lab Results   Component Value Date    INR 0.87 03/10/2014      PACU Imaging Not applicable     Wound/Incision Incision/Surgical Site 03/01/18 Bilateral;Midline Knee (Active)   Incision Assessment UTV 3/1/2018   1:05 PM   Closure Adhesive strip(s) 3/1/2018 12:51 PM   Dressing Intervention Clean, dry, intact 3/1/2018  1:05 PM   Number of days:0      CMS Peripheral Neurovascular WDL:  WDL except (03/01/18 1345)  LLE Temperature: warm (03/01/18 1345)  LLE Color: no discoloration (03/01/18 1345)  LLE Sensation: numbness present (03/01/18 1345)  RLE Temperature: warm (03/01/18 1345)  RLE Color: no discoloration (03/01/18 1345)  RLE Sensation: numbness present (03/01/18 1345)   Equipment ice pack   Other LDA       IV Access Peripheral IV 03/01/18 Left Hand (Active)   Site Assessment WDL 3/1/2018  2:00 PM   Line Status Infusing 3/1/2018  2:00 PM   Phlebitis Scale 0-->no symptoms 3/1/2018  2:00 PM   Infiltration Scale 0 3/1/2018  2:00 PM   Infiltration Site Treatment Method  None 3/1/2018 10:27 AM   Extravasation? No 3/1/2018 10:27 AM   Dressing Intervention New dressing  3/1/2018 10:27 AM   Number of days:0      Blood Products Not applicable EBL 25 mL   Intake/Output  150ml   Drains / Lutz  none   Time of void PreOp Void Prior to Procedure: (P) 0939 (03/01/18 0939)    PostOp     Bladder Scan     PO  240 tolerating sips and crackers     Vitals    B/P: 112/59  T: 98.2  F (36.8  C)    Temp src: Oral  P:       Heart Rate: 77 (03/01/18 1400)     R: 9  O2:  SpO2: 97 %    O2 Device: None (Room air) (03/01/18 1315)    Oxygen Delivery: 5 LPM (03/01/18 1305)         Family/support present yes   Patient belongings Patient Belongings: clothing;shoes  Disposition of Belongings: Locker   Patient transported on cart   DC meds/scripts (obs/outpt) Not applicable     Special needs/considerations None   Tasks needing completion would like to have a nicotine patch       Brandie Mckeon RN  [facility] 11887

## 2018-03-01 NOTE — ANESTHESIA PROCEDURE NOTES
Spinal/LP Procedure Note    Spinal Block  Staff:     Anesthesiologist:  SANDRO GALLEGOS  Location: OB  Procedure Start/Stop Times:     patient identified, IV checked, risks and benefits discussed, informed consent, monitors and equipment checked and pre-op evaluation      Correct Patient: Yes      Correct Position: Yes      Correct Site: Yes      Correct Procedure: Yes      Correct Laterality:  N/A    Site Marked:  N/A  Procedure:     Procedure:  Intrathecal    ASA:  2    Position:  Sitting    Sterile Prep: chloraprep      Insertion site:  L3-4    Approach:  Midline    Needle Type:  Jamison    Needle gauge (G):  25    Local Skin Infiltration:  1% lidocaine    amount (ml):  1    Needle Length (in):  3.5    Introducer used: Yes      Introducer gauge:  20 G    Attempts:  1    Redirects:  0    CSF:  Clear    Paresthesias:  No

## 2018-03-01 NOTE — IP AVS SNAPSHOT
[facility]    [address]    [facility] [place] [address]    Phone:  [phone]                                       After Visit Summary   3/1/2018    Caesar Richardson    MRN: [id number]           After Visit Summary Signature Page     I have received my discharge instructions, and my questions have been answered. I have discussed any challenges I see with this plan with the nurse or doctor.    ..........................................................................................................................................  Patient/Patient Representative Signature      ..........................................................................................................................................  Patient Representative Print Name and Relationship to Patient    ..................................................               ................................................  Date                                            Time    ..........................................................................................................................................  Reviewed by Signature/Title    ...................................................              ..............................................  Date                                                            Time

## 2018-03-01 NOTE — IP AVS SNAPSHOT
MRN:[id number]                      After Visit Summary   3/1/2018    Caesar Richardson    MRN: [id number]           Thank you!     Thank you for choosing [facility] for your care. Our goal is always to provide you with excellent care. Hearing back from our patients is one way we can continue to improve our services. Please take a few minutes to complete the written survey that you may receive in the mail after you visit with us. Thank you!        Patient Information     Date Of Birth          1980        Designated Caregiver       Most Recent Value    Caregiver    Will someone help with your care after discharge? yes    Name of designated caregiver Amarilys Richardson - mother    Phone number of caregiver [phone]    Caregiver address [address] [place] [address]      About your hospital stay     You were admitted on:  March 1, 2018 You last received care in the:  [facility] 8A    You were discharged on:  March 3, 2018        Reason for your hospital stay       You had bilateral knee surgery                  Who to Call     For medical emergencies, please call 911.  For non-urgent questions about your medical care, please call your primary care provider or clinic, [phone]  For questions related to your surgery, please call your surgery clinic        Attending Provider     Provider Specialty    Pradeep Turner MD Orthopedics       Primary Care Provider Office Phone # Fax #    Sebas Leon [phone]       When to contact your care team       CALL YOUR PHYSICIAN IF:  1.  Your pain begins to worsen and is unable to be controlled with your medications.  2.  Excessive redness or drainage of cloudy or bloody material from the wounds (Clear red tinted fluid and some mild drainage should be expected). Drainage of any kind 5 days after surgery should be reported to the doctor.  3.  You have a temperature elevation greater than 101.5    4.  You have  pain, swelling or redness in your calf. You have numbness or weakness in your leg or foot.    During regular business hours call the [facility] at [phone] and ask for the triage nurse.  After hours or weekends call the hospital  at [phone] and ask for the ortho resident on call.                  After Care Instructions     Activity       Your activity upon discharge: as tolerated. Do the exercises as instructed by therapy during your hospital stay            Diet       Follow this diet upon discharge: Regular            Discharge Instructions       If you had your whole knee replaced or revised and were given a CPM, you should continue to use your CPM every day for 2 weeks or until at 90 degrees comfortably. Use for 4-6 hours per day.            Wound care and dressings       Instructions to care for your wound at home: Change your dressing daily.  After 5 days. you may shower with your wound un covered as long as it is clean and dry. Do not scrub your wound. Pat the wound dry and replace your dressing. Keep a dressing on until your two week follow up. . Cover the wound to shower if it is not clean and dry. Call the clinic if you notice any drainage.  If you have staples on your incision, then keep you wound covered for showers until removed at the two week post op visit.                  Follow-up Appointments     Follow Up and recommended labs and tests       Follow up with Milagros Valencia on 3/15/18 at 1015am at:    [facility]  Floor [address]  [address] [place] [address]    Appointments: [phone]                  Your next 10 appointments already scheduled     Mar 15, 2018 10:15 AM CDT   (Arrive by 10:00 AM)   Post-Op with JASON Swanson [facility] ([facility] [place])    [address] [place] [address]  4th Floor  [facility] [address]   [phone]            Apr 09, 2018 11:15 AM CDT   (Arrive by 11:00 AM)   RETURN  "KNEE with Pradeep Turner MD   [facility] ([facility])    [address]  4th Floor  [facility] [id number] [phone]            Apr 11, 2018 10:30 AM CDT   [facility] with  [facility]   [facility] ([facility])    [address]  [address]  [facility] [id number] [phone]            Apr 11, 2018 11:00 AM CDT   Return with  BMT DOM   [facility] ([facility])    [address]  [address]  [facility] [address] [phone]              Future tests that were ordered for you     Assign Questionnaire Series to Patient                 Pending Results     No orders found from 2/27/2018 to 3/2/2018.            Statement of Approval     Ordered          03/03/18 0832  I have reviewed and agree with all the recommendations and orders detailed in this document.  EFFECTIVE NOW     Approved and electronically signed by:  Pradeep Turner MD             Admission Information     Date & Time Provider Department Dept. Phone    3/1/2018 Pradeep Turner MD  [phone]      Your Vitals Were     Blood Pressure Temperature Respirations Height Weight Pulse Oximetry    110/60 (BP Location: Right arm) 97.4  F (36.3  C) (Oral) 16 1.727 m (5' 8\") 90.9 kg (200 lb 6.4 oz) 97%    BMI (Body Mass Index)                   30.47 kg/m2           MyChart Information     "[employer] [facility]" gives you secure access to your electronic health record. If you see a primary care provider, you can also send messages to your care team and make appointments. If you have questions, please call your primary care clinic.  If you do not have a primary care provider, please call [phone] and they will assist you.        Care EveryWhere ID     This is your Care EveryWhere ID. This could be used by other organizations to access your [facility] medical records  [id number]   "      Equal Access to Services     [facility]: Hadii aad ku hadariannakodak Soneyda, waaxda luqadaha, qaybta kachetananoop powell, rosalee vicente. So [facility] [phone].    ATENCIÓN: Si ramseyla monse, tiene a st disposición servicios gratuitos de asistencia lingüística. Silverioame al [phone].    We comply with applicable federal civil rights laws and [place] laws. We do not discriminate on the basis of race, color, national origin, age, disability, sex, sexual orientation, or gender identity.               Review of your medicines      START taking        Dose / Directions    acetaminophen 325 MG tablet   Commonly known as:  TYLENOL   Used for:  Pain        Take 1-2 tablets every 4 hours as needed for pain.   Quantity:  100 tablet   Refills:  1       aspirin  MG EC tablet   Used for:  Pain        Dose:  325 mg   Take 1 tablet (325 mg) by mouth daily   Quantity:  30 tablet   Refills:  0       hydrOXYzine 25 MG tablet   Commonly known as:  ATARAX        Dose:  25 mg   Take 1 tablet (25 mg) by mouth every 6 hours as needed for itching   Quantity:  60 tablet   Refills:  0       order for DME        cold packs for home use   Quantity:  2 Units   Refills:  0       oxyCODONE IR 5 MG tablet   Commonly known as:  ROXICODONE   Used for:  Pain        Dose:  5-10 mg   Take 1-2 tablets (5-10 mg) by mouth every 4 hours as needed for other (pain control or improvement in physical function. Hold dose for analgesic side effects.)   Quantity:  80 tablet   Refills:  0       senna-docusate 8.6-50 MG per tablet   Commonly known as:  SENOKOT-S;PERICOLACE   Used for:  Pain        Dose:  1 tablet   Take 1 tablet by mouth 2 times daily as needed for constipation   Quantity:  100 tablet   Refills:  1         CONTINUE these medicines which may have CHANGED, or have new prescriptions. If we are uncertain of the size of tablets/capsules you have at home, strength may be listed as something that might have changed.         Dose / Directions    traZODone 150 MG tablet   Commonly known as:  DESYREL   This may have changed:    - how much to take  - when to take this   Used for:  Anxiety, CML (chronic myelocytic leukemia) (H), Krfns-gpgxhs-bcyx disease, unspecified, Panlobular emphysema (H), Heartburn, Cough, Erectile dysfunction, unspecified erectile dysfunction type, Status post allogeneic bone marrow transplant (H), Diminished libido        Dose:  150 mg   Take 1 tablet (150 mg) by mouth At Bedtime   Quantity:  90 tablet   Refills:  6         CONTINUE these medicines which have NOT CHANGED        Dose / Directions    diclofenac 1 % Gel topical gel   Commonly known as:  VOLTAREN   Used for:  CML (chronic myelocytic leukemia) (H), Panlobular emphysema (H), Anxiety, Cough, Status post allogeneic bone marrow transplant (H), Ohcyt-pbncbr-uqom disease, unspecified, Diminished libido, Erectile dysfunction, unspecified erectile dysfunction type, Heartburn        Apply topically 4 times daily   Quantity:  2 Tube   Refills:  6       fluticasone-salmeterol 250-50 MCG/DOSE diskus inhaler   Commonly known as:  ADVAIR   Used for:  Panlobular emphysema (H), CML (chronic myelocytic leukemia) (H), Anxiety, Cough, Status post allogeneic bone marrow transplant (H), Jbfhy-inkqrm-pddm disease, unspecified, Diminished libido, Erectile dysfunction, unspecified erectile dysfunction type, Heartburn        Dose:  1 puff   Inhale 1 puff into the lungs 2 times daily   Quantity:  1 Inhaler   Refills:  12       tiotropium 18 MCG capsule   Commonly known as:  SPIRIVA HANDIHALER   Used for:  Panlobular emphysema (H), CML (chronic myelocytic leukemia) (H), Anxiety, Cough, Status post allogeneic bone marrow transplant (H), Pahtv-tthycs-pikk disease, unspecified, Diminished libido, Erectile dysfunction, unspecified erectile dysfunction type, Heartburn        Inhale contents of one capsule daily.   Quantity:  30 capsule   Refills:  12            Where to get your  medicines      These medications were sent to [place], [place] - [address]  [address], [facility] [address]     Phone:  [phone]     acetaminophen 325 MG tablet    aspirin  MG EC tablet    hydrOXYzine 25 MG tablet    senna-docusate 8.6-50 MG per tablet         Some of these will need a paper prescription and others can be bought over the counter. Ask your nurse if you have questions.     Bring a paper prescription for each of these medications     order for DME    oxyCODONE IR 5 MG tablet                Protect others around you: Learn how to safely use, store and throw away your medicines at www.disposemymeds.org.        Information about OPIOIDS     PRESCRIPTION OPIOIDS: WHAT YOU NEED TO KNOW    Prescription opioids can be used to help relieve moderate to severe pain and are often prescribed following a surgery or injury, or for certain health conditions. These medications can be an important part of treatment but also come with serious risks. It is important to work with your health care provider to make sure you are getting the safest, most effective care.    WHAT ARE THE RISKS AND SIDE EFFECTS OF OPIOID USE?  Prescription opioids carry serious risks of addiction and overdose, especially with prolonged use. An opioid overdose, often marked by slowed breathing can cause sudden death. The use of prescription opioids can have a number of side effects as well, even when taken as directed:      Tolerance - meaning you might need to take more of a medication for the same pain relief    Physical dependence - meaning you have symptoms of withdrawal when a medication is stopped    Increased sensitivity to pain    Constipation    Nausea, vomiting, and dry mouth    Sleepiness and dizziness    Confusion    Depression    Low levels of testosterone that can result in lower sex drive, energy, and strength    Itching and sweating    RISKS ARE GREATER WITH:    History of  drug misuse, substance use disorder, or overdose    Mental health conditions (such as depression or anxiety)    Sleep apnea    Older age (65 years or older)    Pregnancy    Avoid alcohol while taking prescription opioids.   Also, unless specifically advised by your health care provider, medications to avoid include:    Benzodiazepines (such as Xanax or Valium)    Muscle relaxants (such as Soma or Flexeril)    Hypnotics (such as Ambien or Lunesta)    Other prescription opioids    KNOW YOUR OPTIONS:  Talk to your health care provider about ways to manage your pain that do not involve prescription opioids. Some of these options may actually work better and have fewer risks and side effects:    Pain relievers such as acetaminophen, ibuprofen, and naproxen    Some medications that are also used for depression or seizures    Physical therapy and exercise    Cognitive behavioral therapy, a psychological, goal-directed approach, in which patients learn how to modify physical, behavioral, and emotional triggers of pain and stress    IF YOU ARE PRESCRIBED OPIOIDS FOR PAIN:    Never take opioids in greater amounts or more often than prescribed    Follow up with your primary health care provider and work together to create a plan on how to manage your pain.    Talk about ways to help manage your pain that do not involve prescription opioids    Talk about all concerns and side effects    Help prevent misuse and abuse    Never sell or share prescription opioids    Never use another person's prescription opioids    Store prescription opioids in a secure place and out of reach of others (this may include visitors, children, friends, and family)    Visit www.cdc.gov/drugoverdose to learn about risks of opioid abuse and overdose    If you believe you may be struggling with addiction, tell your health care provider and ask for guidance or call [facility]'s National Helpline at 8-192-583-HELP    LEARN MORE /  www.cdc.gov/drugoverdose/prescribing/guideline.html    Safely dispose of unused prescription opioids: Find your local drug take-back programs and more information about the importance of safe disposal at www.doseofreality.[place].gov             Medication List: This is a list of all your medications and when to take them. Check marks below indicate your daily home schedule. Keep this list as a reference.      Medications           Morning Afternoon Evening Bedtime As Needed    acetaminophen 325 MG tablet   Commonly known as:  TYLENOL   Take 1-2 tablets every 4 hours as needed for pain.   Last time this was given:  975 mg on 3/1/2018  9:26 AM                                aspirin  MG EC tablet   Take 1 tablet (325 mg) by mouth daily                                diclofenac 1 % Gel topical gel   Commonly known as:  VOLTAREN   Apply topically 4 times daily                                fluticasone-salmeterol 250-50 MCG/DOSE diskus inhaler   Commonly known as:  ADVAIR   Inhale 1 puff into the lungs 2 times daily                                hydrOXYzine 25 MG tablet   Commonly known as:  ATARAX   Take 1 tablet (25 mg) by mouth every 6 hours as needed for itching   Last time this was given:  25 mg on 3/2/2018  2:33 PM                                order for DME   cold packs for home use                                oxyCODONE IR 5 MG tablet   Commonly known as:  ROXICODONE   Take 1-2 tablets (5-10 mg) by mouth every 4 hours as needed for other (pain control or improvement in physical function. Hold dose for analgesic side effects.)   Last time this was given:  5 mg on 3/3/2018 10:41 AM                                senna-docusate 8.6-50 MG per tablet   Commonly known as:  SENOKOT-S;PERICOLACE   Take 1 tablet by mouth 2 times daily as needed for constipation   Last time this was given:  1 tablet on 3/2/2018 10:59 PM                                tiotropium 18 MCG capsule   Commonly known as:  SPIRIVA HANDIHALER    Inhale contents of one capsule daily.                                traZODone 150 MG tablet   Commonly known as:  DESYREL   Take 1 tablet (150 mg) by mouth At Bedtime

## 2018-03-01 NOTE — ANESTHESIA POSTPROCEDURE EVALUATION
Patient: Caesar Richardson    Procedure(s):  Bilateral Knee Uni Compartmental Arthroplasty - Wound Class: I-Clean    Diagnosis:Osteoarthritis  Diagnosis Additional Information: No value filed.    Anesthesia Type:  No value filed.    Note:  Anesthesia Post Evaluation    Patient location during evaluation: PACU  Patient participation: Able to fully participate in evaluation  Level of consciousness: awake and alert  Pain management: adequate  Airway patency: patent  Cardiovascular status: hemodynamically stable  Respiratory status: acceptable  Hydration status: acceptable  PONV: none     Anesthetic complications: None          Last vitals:  Vitals:    03/01/18 1345 03/01/18 1400 03/01/18 1415   BP: 112/59 108/62    Resp: 30 9 16   Temp:      SpO2: 95% 97% 96%         Electronically Signed By: Flaco Moulton MD, MD  March 1, 2018  2:54 PM

## 2018-03-01 NOTE — OP NOTE
Procedure Date: 03/01/2018      PREOPERATIVE AND POSTOPERATIVE DIAGNOSIS:  Bilateral medial compartment osteoarthritis of the knees.      PROCEDURES PERFORMED:  Bilateral medial compartment knee arthroplasty.      SURGEON:  Pradeep Turner MD      FIRST ASSISTANT:  Ronaldo Morse PA-C.  It was medically necessary for a physician assistant to assist in the surgical care of this patient.  There was no resident physician available for assistance.      INDICATION FOR SURGERY:  Caesar has had CML and treatment is now in remission.  He has severe medial osteoarthritis of each knee, unacceptably severe and unable to function.  Risks, goals and options were described.  He understood, and he wished to proceed.      DESCRIPTION OF PROCEDURE:  Under subarachnoid block anesthesia in the supine position, both knees were prepped and draped in the usual sterile fashion.  Pause for the cause occurred, and all present were in agreement.  We started with the right leg.  We exsanguinated the limb and inflated the tourniquet.  We used a 12 cm longitudinal anteromedial incision with a median parapatellar arthrotomy.  We inspected the patellofemoral joint and the lateral compartment and felt they were benign.  Medial compartment was severely involved with no hyaline cartilage.  We used the [employer] tibial external alignment guide and did a 4-mm resection followed by spacer block with a -2 cutting block in extension performed and then completed our posterior and chamfer cut.  We then trialed a #5 femur and a #5 tibia.  We removed osteophytes surrounding the joint, and we drilled lug holes for the femur and lug holes for the tibia.  We prepared the bone with pulse lavage, and we then sterilely dried the bone and used a single batch of tobramycin impregnated cement, cementing tibia, femur and inserting our 8 mm insert.  We held the knee in partial extension until polymerization was complete, and we closed in layers after posterior capsular  injection had been administered.      We then exsanguinated the left leg and inflated the tourniquet, and we followed exactly the same sequence on the left side; however, the deformity was much greater and because of the greater deformity, we had a larger flexion block, but the balance was the same with the -2 distal femur extension cutting block.  We completed our cuts, sized for a 5 and then prepared the femur and the tibia with lug holes and pulse lavaged all surfaces.  We sterilely dried the surfaces, and then we cemented tibia and femur with 8 mm insert.  We achieved excellent extension, flexion and good tracking with normal stability.  There were no operative complications noted.  We completed the closure and returned the patient to recovery in good condition.         REBEKAH LAGOS MD             D: 2018   T: 2018   MT: ODALIS      Name:     GRACIE CORONEL   MRN:      [id number]        Account:        [id number]   :      1980           Procedure Date: 2018      Document: [id number]

## 2018-03-01 NOTE — CONSULTS
[facility]  Katlin Christianson PA-C    Caesar Richardson MRN# [id number]   Age: 37 year old YOB: 1980   Date of Admission: 3/1/2018     Reason for consult:        Requesting physician Dr. Turner      ASSESSMENT:   1. Status post bilateral medial compartment arthroplasty  2. Coronary artery status: no hx of CAD  3. History of DVT/PE: none    RECOMMENDATIONS:   Routine postoperative labs are obtained.   The patient maintained on PO intake  Pain management: scheduled tylenol, prn oxycodone PO, prn IV dilaudid  Anticoagulation: per primary team  Continue PTA inhalers    Thank you for having me see this patient. We will continue to follow with you for these medical issues.     DISCUSSION:   Caesar Richardson is a very pleasant 37 year old male who underwent a bilateral knee compartmental arthroplasty today by Dr. Turner for treatment of post-traumatic osteoarthritis of both knees. I have been asked to see her by Dr. Turner to assess medical hx including BMT, CML, COPD, GERD, and RA.   Please see Dr. Turner's notes and the charting for details regarding the patient's orthopedic history and the indications for surgery. The events of surgery are noted. The patient did receive general anesthesia. Estimated blood loss was 20 mL intraoperatively. Blood pressures stable throughout.   The patient is seen by me shortly after admission to the recovery room. Blood pressure has been stable in the recovery room.   PAST MEDICAL HISTORY:   Reviewed and is remarkable for:   1.  RA diagnosed in 2011  2. GERD  3. COPD. Mild-mod, managed on spiriva and fluticasone-solumedrol QD  4. CML s/p BMT (2006). In remission. Most recent clinic visit with Dr. Leon of hematology 10/25/17    PAST SURGICAL HISTORY:   Past Surgical History:   Procedure Laterality Date     BMT CELL PRODUCT INFUSION  2012     ORTHOPEDIC SURGERY  2007     TRANSPLANT  2006    BMT       MEDICATIONS PREOPERATIVE:   1. Spiriva  2.  "Advair  3. Voltaren gel  4. Trazodone    ALLERGIES:      Allergies   Allergen Reactions     No Known Allergies        SOCIAL HISTORY:   Social History     Social History     Marital status:      Spouse name: N/A     Number of children: 0     Years of education: 12     Occupational History     lawn care       [employer]     Social History Main Topics     Smoking status: Current Every Day Smoker     Packs/day: 1.00     Years: 25.00     Types: Cigarettes     Smokeless tobacco: Never Used      Comment: down to few cigs per week     Alcohol use No     Drug use: No     Sexual activity: Not on file     Other Topics Concern      Service No     Blood Transfusions No     Caffeine Concern No     Occupational Exposure No     Hobby Hazards No     Sleep Concern No     Stress Concern Yes     Weight Concern No     Special Diet No     Back Care No     Exercise Yes     Bike Helmet No     Seat Belt Yes     Self-Exams No     Social History Narrative       REVIEW OF SYSTEMS:   Ten point review of systems negative except as stated above in History of Present Illness.    PHYSICAL EXAMINATION:   Vitals were reviewed  Blood pressure 108/62, temperature 98.2  F (36.8  C), temperature source Oral, resp. rate 16, height 1.727 m (5' 8\"), weight 90.9 kg (200 lb 6.4 oz), SpO2 96 %.  General:alert and oriented x 3, NAD  HEENT: NCAT, anicteric sclera, EOMI, moist mucous membranes  Neck: supple, no lymphadenopathy or thyromegaly  Cardiovascular: RRR, S1S2, no murmurs appreciated  Lungs:CTAB, no wheezing or crackles  Abdomen: soft, nontender, nondistended with normoactive bowel sounds  Extremities: no edema, distal pulses palpable  Neurologic: grossly nonfocal    Orthopedic exam is per Dr. Turner.       Katlin Christianson PA-C  Hospitalist Medicine  Pager: [phone]    "

## 2018-03-01 NOTE — BRIEF OP NOTE
[facility], [place]    Brief Operative Note    Pre-operative diagnosis: Osteoarthritis  Post-operative diagnosis same  Procedure: Procedure(s):  Bilateral Knee Uni Compartmental Arthroplasty - Wound Class: I-Clean  Surgeon: Surgeon(s) and Role:     * Pradeep Turner MD - Primary     * Ronaldo Morse PA-C - Assisting  Anesthesia: Spinal   Estimated blood loss: 20 cc  Drains: none  Specimens: * No specimens in log *  Findings:   Medial OA  Complications: None

## 2018-03-01 NOTE — ANESTHESIA PROCEDURE NOTES
Peripheral Nerve Block Procedure Note    Staff:     Anesthesiologist:  FABRICIO JIMENEZ    Referred By:  REBEKAH LAGOS  Location: Pre-op  Procedure Start/Stop TImes:      3/1/2018 10:11 AM     3/1/2018 10:21 AM    patient identified, IV checked, site marked, risks and benefits discussed, informed consent, monitors and equipment checked, pre-op evaluation, at physician/surgeon's request and post-op pain management      Correct Patient: Yes      Correct Position: Yes      Correct Site: Yes      Correct Procedure: Yes      Correct Laterality:  Yes    Site Marked:  Yes  Procedure details:     Procedure:  Adductor canal    ASA:  2    Laterality:  Bilateral    Position:  Supine    Sterile Prep: chloraprep, mask and sterile gloves      Needle:  Insulated and short bevel    Needle gauge:  21    Needle length (inches):  4    Ultrasound: Yes      Ultrasound used to identify targeted nerve, plexus, or vascular structure and placed a needle adjacent to it      Permanent Image entered into patiient's record      Abnormal pain on injection: No      Blood Aspirated: No      Paresthesias:  No    Bleeding at site: No      Bolus via:  Needle    Infusion Method:  Single Shot    Complications:  None  Assessment/Narrative:      Discussed with Patient Off-Label use of Liposomal Bupivacaine (Exparel) for Nerve Block.    Relevant risks & benefits were discussed with patient.    All questions were answered and there was agreement to proceed.    Patient signed Off-Label Use of Exparel Consent Form.

## 2018-03-01 NOTE — ANESTHESIA PREPROCEDURE EVALUATION
Anesthesia Evaluation     . Pt has had prior anesthetic. Type: General    No history of anesthetic complications          ROS/MED HX    ENT/Pulmonary:     (+)tobacco use, Current use mild COPD, , . .    Neurologic:  - neg neurologic ROS     Cardiovascular:  - neg cardiovascular ROS       METS/Exercise Tolerance:     Hematologic:  - neg hematologic  ROS       Musculoskeletal:   (+) arthritis, , , -       GI/Hepatic:     (+) GERD Asymptomatic on medication,       Renal/Genitourinary:  - ROS Renal section negative       Endo:  - neg endo ROS       Psychiatric:  - neg psychiatric ROS       Infectious Disease:  - neg infectious disease ROS       Malignancy:   (+) Malignancy History of Lymphoma/Leukemia  Lymph CA status post Chemo, S/p BMT        Other:                     Physical Exam  Normal systems: cardiovascular and pulmonary    Airway   Mallampati: II  TM distance: >3 FB  Neck ROM: full    Dental   (+) missing    Cardiovascular       Pulmonary                     Anesthesia Plan      History & Physical Review      ASA Status:  2 .        Plan for Spinal Maintenance will be TIVA.    PONV prophylaxis:  Ondansetron (or other 5HT-3) and Dexamethasone or Solumedrol       Postoperative Care  Postoperative pain management:  IV analgesics, Oral pain medications, Neuraxial analgesia and Peripheral nerve block (Single Shot).      Consents  Anesthetic plan, risks, benefits and alternatives discussed with:  Patient.  Use of blood products discussed: No .   .                          .

## 2018-03-01 NOTE — ANESTHESIA CARE TRANSFER NOTE
Patient: Caesar Richardson    Procedure(s):  Bilateral Knee Uni Compartmental Arthroplasty - Wound Class: I-Clean    Diagnosis: Osteoarthritis  Diagnosis Additional Information: No value filed.    Anesthesia Type:   No value filed.     Note:  Airway :Face Mask  Patient transferred to:PACU  Handoff Report: Identifed the Patient, Identified the Reponsible Provider, Reviewed the pertinent medical history, Discussed the surgical course, Reviewed Intra-OP anesthesia mangement and issues during anesthesia, Set expectations for post-procedure period and Allowed opportunity for questions and acknowledgement of understanding      Vitals: (Last set prior to Anesthesia Care Transfer)    CRNA VITALS  3/1/2018 1231 - 3/1/2018 1307      3/1/2018             Pulse: 78    Ht Rate: 73    SpO2: 98 %    EKG: Sinus rhythm                Electronically Signed By: JASON Garcia CRNA  March 1, 2018  1:07 PM

## 2018-03-02 ENCOUNTER — APPOINTMENT (OUTPATIENT)
Dept: PHYSICAL THERAPY | Facility: CLINIC | Age: 38
DRG: 462 | End: 2018-03-02
Attending: ORTHOPAEDIC SURGERY
Payer: MEDICARE

## 2018-03-02 PROBLEM — Z41.9 ELECTIVE SURGERY: Status: ACTIVE | Noted: 2018-03-02

## 2018-03-02 PROBLEM — Z96.653 S/P BILATERAL UNICOMPARTMENTAL KNEE REPLACEMENT: Status: ACTIVE | Noted: 2018-03-02

## 2018-03-02 LAB
ANION GAP SERPL CALCULATED.3IONS-SCNC: 5 MMOL/L (ref 3–14)
BUN SERPL-MCNC: 15 MG/DL (ref 7–30)
CALCIUM SERPL-MCNC: 8.8 MG/DL (ref 8.5–10.1)
CHLORIDE SERPL-SCNC: 111 MMOL/L (ref 94–109)
CO2 SERPL-SCNC: 26 MMOL/L (ref 20–32)
CREAT SERPL-MCNC: 1 MG/DL (ref 0.66–1.25)
ERYTHROCYTE [DISTWIDTH] IN BLOOD BY AUTOMATED COUNT: 13.3 % (ref 10–15)
GFR SERPL CREATININE-BSD FRML MDRD: 84 ML/MIN/1.7M2
GLUCOSE SERPL-MCNC: 156 MG/DL (ref 70–99)
HCT VFR BLD AUTO: 38.7 % (ref 40–53)
HGB BLD-MCNC: 12.7 G/DL (ref 13.3–17.7)
MCH RBC QN AUTO: 30.5 PG (ref 26.5–33)
MCHC RBC AUTO-ENTMCNC: 32.8 G/DL (ref 31.5–36.5)
MCV RBC AUTO: 93 FL (ref 78–100)
PLATELET # BLD AUTO: 163 10E9/L (ref 150–450)
POTASSIUM SERPL-SCNC: 5 MMOL/L (ref 3.4–5.3)
RBC # BLD AUTO: 4.17 10E12/L (ref 4.4–5.9)
SODIUM SERPL-SCNC: 142 MMOL/L (ref 133–144)
WBC # BLD AUTO: 14.2 10E9/L (ref 4–11)

## 2018-03-02 PROCEDURE — 25000132 ZZH RX MED GY IP 250 OP 250 PS 637: Mod: GY | Performed by: PHYSICIAN ASSISTANT

## 2018-03-02 PROCEDURE — 97116 GAIT TRAINING THERAPY: CPT | Mod: GP | Performed by: PHYSICAL THERAPIST

## 2018-03-02 PROCEDURE — 97161 PT EVAL LOW COMPLEX 20 MIN: CPT | Mod: GP | Performed by: PHYSICAL THERAPIST

## 2018-03-02 PROCEDURE — 97530 THERAPEUTIC ACTIVITIES: CPT | Mod: GP | Performed by: PHYSICAL THERAPIST

## 2018-03-02 PROCEDURE — 40000894 ZZH STATISTIC OT IP EVAL DEFER: Performed by: OCCUPATIONAL THERAPIST

## 2018-03-02 PROCEDURE — 36415 COLL VENOUS BLD VENIPUNCTURE: CPT | Performed by: PHYSICIAN ASSISTANT

## 2018-03-02 PROCEDURE — 40000193 ZZH STATISTIC PT WARD VISIT: Performed by: PHYSICAL THERAPIST

## 2018-03-02 PROCEDURE — 80048 BASIC METABOLIC PNL TOTAL CA: CPT | Performed by: PHYSICIAN ASSISTANT

## 2018-03-02 PROCEDURE — 85027 COMPLETE CBC AUTOMATED: CPT | Performed by: PHYSICIAN ASSISTANT

## 2018-03-02 PROCEDURE — 25000132 ZZH RX MED GY IP 250 OP 250 PS 637: Mod: GY | Performed by: ORTHOPAEDIC SURGERY

## 2018-03-02 PROCEDURE — 12000001 ZZH R&B MED SURG/OB UMMC

## 2018-03-02 PROCEDURE — 99207 ZZC CDG-MDM COMPONENT: MEETS MODERATE - UP CODED: CPT | Performed by: INTERNAL MEDICINE

## 2018-03-02 PROCEDURE — 25000128 H RX IP 250 OP 636: Performed by: INTERNAL MEDICINE

## 2018-03-02 PROCEDURE — 97110 THERAPEUTIC EXERCISES: CPT | Mod: GP | Performed by: PHYSICAL THERAPIST

## 2018-03-02 PROCEDURE — 25000128 H RX IP 250 OP 636: Performed by: ORTHOPAEDIC SURGERY

## 2018-03-02 PROCEDURE — 99232 SBSQ HOSP IP/OBS MODERATE 35: CPT | Performed by: INTERNAL MEDICINE

## 2018-03-02 PROCEDURE — A9270 NON-COVERED ITEM OR SERVICE: HCPCS | Mod: GY | Performed by: PHYSICIAN ASSISTANT

## 2018-03-02 PROCEDURE — A9270 NON-COVERED ITEM OR SERVICE: HCPCS | Mod: GY | Performed by: ORTHOPAEDIC SURGERY

## 2018-03-02 RX ORDER — ACETAMINOPHEN 325 MG/1
TABLET ORAL
Qty: 100 TABLET | Refills: 1 | Status: SHIPPED | OUTPATIENT
Start: 2018-03-02 | End: 2020-11-04

## 2018-03-02 RX ORDER — OXYCODONE HYDROCHLORIDE 5 MG/1
5-10 TABLET ORAL
Qty: 80 TABLET | Refills: 0 | Status: SHIPPED | OUTPATIENT
Start: 2018-03-02 | End: 2018-03-02

## 2018-03-02 RX ORDER — OXYCODONE HYDROCHLORIDE 5 MG/1
5-10 TABLET ORAL EVERY 4 HOURS PRN
Qty: 80 TABLET | Refills: 0 | Status: SHIPPED | OUTPATIENT
Start: 2018-03-02 | End: 2018-03-15

## 2018-03-02 RX ORDER — AMOXICILLIN 250 MG
1 CAPSULE ORAL 2 TIMES DAILY PRN
Qty: 100 TABLET | Refills: 1 | Status: SHIPPED | OUTPATIENT
Start: 2018-03-02 | End: 2018-07-23

## 2018-03-02 RX ORDER — KETOROLAC TROMETHAMINE 30 MG/ML
30 INJECTION, SOLUTION INTRAMUSCULAR; INTRAVENOUS EVERY 6 HOURS
Status: COMPLETED | OUTPATIENT
Start: 2018-03-02 | End: 2018-03-03

## 2018-03-02 RX ORDER — ASPIRIN 325 MG
325 TABLET ORAL DAILY
Status: DISCONTINUED | OUTPATIENT
Start: 2018-03-02 | End: 2018-03-03 | Stop reason: HOSPADM

## 2018-03-02 RX ORDER — ACETAMINOPHEN 325 MG/1
650 TABLET ORAL EVERY 4 HOURS PRN
Qty: 100 TABLET | Refills: 1 | Status: SHIPPED | OUTPATIENT
Start: 2018-03-04 | End: 2018-03-02

## 2018-03-02 RX ORDER — HYDROXYZINE HYDROCHLORIDE 25 MG/1
25 TABLET, FILM COATED ORAL EVERY 6 HOURS PRN
Qty: 60 TABLET | Refills: 0 | Status: SHIPPED | OUTPATIENT
Start: 2018-03-02 | End: 2018-07-18

## 2018-03-02 RX ADMIN — KETOROLAC TROMETHAMINE 30 MG: 30 INJECTION, SOLUTION INTRAMUSCULAR at 20:36

## 2018-03-02 RX ADMIN — SENNOSIDES AND DOCUSATE SODIUM 2 TABLET: 8.6; 5 TABLET ORAL at 10:21

## 2018-03-02 RX ADMIN — ASPIRIN 325 MG ORAL TABLET 325 MG: 325 PILL ORAL at 08:06

## 2018-03-02 RX ADMIN — OXYCODONE HYDROCHLORIDE 5 MG: 5 TABLET ORAL at 21:53

## 2018-03-02 RX ADMIN — OXYCODONE HYDROCHLORIDE 10 MG: 5 TABLET ORAL at 17:28

## 2018-03-02 RX ADMIN — CEFAZOLIN SODIUM 2 G: 2 INJECTION, SOLUTION INTRAVENOUS at 01:46

## 2018-03-02 RX ADMIN — OXYCODONE HYDROCHLORIDE 5 MG: 5 TABLET ORAL at 11:32

## 2018-03-02 RX ADMIN — OXYCODONE HYDROCHLORIDE 10 MG: 5 TABLET ORAL at 04:54

## 2018-03-02 RX ADMIN — HYDROXYZINE HYDROCHLORIDE 25 MG: 25 TABLET ORAL at 14:33

## 2018-03-02 RX ADMIN — KETOROLAC TROMETHAMINE 30 MG: 30 INJECTION, SOLUTION INTRAMUSCULAR at 16:19

## 2018-03-02 RX ADMIN — OXYCODONE HYDROCHLORIDE 5 MG: 5 TABLET ORAL at 08:06

## 2018-03-02 RX ADMIN — UMECLIDINIUM 1 PUFF: 62.5 AEROSOL, POWDER ORAL at 08:06

## 2018-03-02 RX ADMIN — NICOTINE 1 PATCH: 14 PATCH, EXTENDED RELEASE TRANSDERMAL at 17:16

## 2018-03-02 RX ADMIN — SENNOSIDES AND DOCUSATE SODIUM 1 TABLET: 8.6; 5 TABLET ORAL at 22:59

## 2018-03-02 RX ADMIN — OXYCODONE HYDROCHLORIDE 5 MG: 5 TABLET ORAL at 01:35

## 2018-03-02 RX ADMIN — OXYCODONE HYDROCHLORIDE 10 MG: 5 TABLET ORAL at 14:33

## 2018-03-02 RX ADMIN — FLUTICASONE FUROATE AND VILANTEROL TRIFENATATE 1 PUFF: 200; 25 POWDER RESPIRATORY (INHALATION) at 08:06

## 2018-03-02 NOTE — DISCHARGE SUMMARY
ORTHOPAEDIC DISCHARGE SUMMARY     Date of Admission: 3/1/2018  Date of Discharge: 3/3/2018 11:30 AM  Disposition: Home  Staff Physician: Pradeep Turner MD  Primary Care Provider: Sebas Leon    DISCHARGE DIAGNOSIS:  Osteoarthritis    PROCEDURES: Procedure(s):  ARTHROPLASTY KNEE UNICOMPARTMENT BILATERAL MEDIAL on 3/1/2018    BRIEF HISTORY:  Admitted following bilateral medial UKA with Dr Turner on 3/1/18    HOSPITAL COURSE:    Surgery was uncomplicated. Caesar Richardson has done well post-operatively. Medicine was consulted post operatively to aid in management of medical comorbidities. See final recommendations below. The patient received routine nursing cares and is medically stable. Vital signs are stable. The patient is tolerating a regular diet without GI distress/nausea or vomiting. Voiding spontaneously. All PT/OT goals have been met for safe mobility. Pain is now controlled on oral medications. Stool softeners have been used while taking pain medications to help prevent constipation. Caesar Richardson is deemed medically safe to discharge to home.     Antibiotics:  ancef given periop and 24 hours postop.   DVT prophylaxis:   daily for 4 weeks  PT Progress: He has met PT/OT goals for safe mobility.    Pain Meds:  He was weaned off all IV pain meds by discharge.  Inpatient Events: No significant events or complications.     Discharge orders and instructions as below.    FOLLOWUP:      Future Appointments  Date Time Provider Department Center   3/3/2018 10:15 AM Ligia Daniels, PT [facility]   3/15/2018 10:15 AM Milagros Valencia APRN CNP [facility]   4/9/2018 11:15 AM Pradeep Turner MD [facility]   4/11/2018 10:30 AM  [facility] LAB DRAW ONL [facility]   4/11/2018 11:00 AM  [facility]       Appointments on [facility]. Call [phone] if you haven't heard regarding these appointments within 7 days of discharge.    PLANNED DISCHARGE  ORDERS:        Current Discharge Medication List      START taking these medications    Details   senna-docusate (SENOKOT-S;PERICOLACE) 8.6-50 MG per tablet Take 1 tablet by mouth 2 times daily as needed for constipation  Qty: 100 tablet, Refills: 1    Associated Diagnoses: Pain      aspirin  MG EC tablet Take 1 tablet (325 mg) by mouth daily  Qty: 30 tablet, Refills: 0    Comments: For DVT prophylaxis, to prevent blood clots  Associated Diagnoses: Pain      oxyCODONE IR (ROXICODONE) 5 MG tablet Take 1-2 tablets (5-10 mg) by mouth every 4 hours as needed for other (pain control or improvement in physical function. Hold dose for analgesic side effects.)  Qty: 80 tablet, Refills: 0    Associated Diagnoses: Pain      acetaminophen (TYLENOL) 325 MG tablet Take 1-2 tablets every 4 hours as needed for pain.  Qty: 100 tablet, Refills: 1    Associated Diagnoses: Pain      hydrOXYzine (ATARAX) 25 MG tablet Take 1 tablet (25 mg) by mouth every 6 hours as needed for itching  Qty: 60 tablet, Refills: 0    Associated Diagnoses: S/P bilateral unicompartmental knee replacement         CONTINUE these medications which have NOT CHANGED    Details   tiotropium (SPIRIVA HANDIHALER) 18 MCG capsule Inhale contents of one capsule daily.  Qty: 30 capsule, Refills: 12    Associated Diagnoses: Panlobular emphysema (H); CML (chronic myelocytic leukemia) (H); Anxiety; Cough; Status post allogeneic bone marrow transplant (H); Lialg-rfporx-fkyn disease, unspecified; Diminished libido; Erectile dysfunction, unspecified erectile dysfunction type; Heartburn      fluticasone-salmeterol (ADVAIR) 250-50 MCG/DOSE diskus inhaler Inhale 1 puff into the lungs 2 times daily  Qty: 1 Inhaler, Refills: 12    Associated Diagnoses: Panlobular emphysema (H); CML (chronic myelocytic leukemia) (H); Anxiety; Cough; Status post allogeneic bone marrow transplant (H); Mjqck-ddxrvv-ndfi disease, unspecified; Diminished libido; Erectile dysfunction, unspecified  erectile dysfunction type; Heartburn      diclofenac (VOLTAREN) 1 % GEL Apply topically 4 times daily  Qty: 2 Tube, Refills: 6    Associated Diagnoses: CML (chronic myelocytic leukemia) (H); Panlobular emphysema (H); Anxiety; Cough; Status post allogeneic bone marrow transplant (H); Kcsef-pqrecs-fpys disease, unspecified; Diminished libido; Erectile dysfunction, unspecified erectile dysfunction type; Heartburn      traZODone (DESYREL) 150 MG tablet Take 1 tablet (150 mg) by mouth At Bedtime  Qty: 90 tablet, Refills: 6    Associated Diagnoses: Anxiety; CML (chronic myelocytic leukemia) (H); Ejmve-yrywrx-jncc disease, unspecified; Panlobular emphysema (H); Heartburn; Cough; Erectile dysfunction, unspecified erectile dysfunction type; Status post allogeneic bone marrow transplant (H); Diminished libido               Discharge Procedure Orders  Reason for your hospital stay   Order Comments: You had bilateral knee surgery     Activity   Order Comments: Your activity upon discharge: as tolerated. Do the exercises as instructed by therapy during your hospital stay   Order Specific Question Answer Comments   Is discharge order? Yes      When to contact your care team   Order Comments: CALL YOUR PHYSICIAN IF:  1.  Your pain begins to worsen and is unable to be controlled with your medications.  2.  Excessive redness or drainage of cloudy or bloody material from the wounds (Clear red tinted fluid and some mild drainage should be expected). Drainage of any kind 5 days after surgery should be reported to the doctor.  3.  You have a temperature elevation greater than 101.5    4.  You have pain, swelling or redness in your calf. You have numbness or weakness in your leg or foot.    During regular business hours call the [facility] at [phone] and ask for the triage nurse.  After hours or weekends call the hospital  at [phone] and ask for the ortho resident on call.     Wound care and dressings   Order Comments:  Instructions to care for your wound at home: Change your dressing daily.  After 5 days. you may shower with your wound un covered as long as it is clean and dry. Do not scrub your wound. Pat the wound dry and replace your dressing. Keep a dressing on until your two week follow up. . Cover the wound to shower if it is not clean and dry. Call the clinic if you notice any drainage.  If you have staples on your incision, then keep you wound covered for showers until removed at the two week post op visit.     Discharge Instructions   Order Comments: If you had your whole knee replaced or revised and were given a CPM, you should continue to use your CPM every day for 2 weeks or until at 90 degrees comfortably. Use for 4-6 hours per day.     Follow Up and recommended labs and tests   Order Comments: Follow up with Milagros Valencia on 3/15/18 at 1015am at:    Orthopaedic Clinic  [facility]  Floor [address]  [address] [place] [id number]    Appointments: [phone]     Full Code     Diet   Order Comments: Follow this diet upon discharge: Regular   Order Specific Question Answer Comments   Is discharge order? Yes      Assign Questionnaire Series to Patient           Luis Daniel Olivarez MD 03/02/18  Orthopaedic Surgery Resident, PGY-4  Pager: [phone]

## 2018-03-02 NOTE — PLAN OF CARE
Problem: Patient Care Overview  Goal: Plan of Care/Patient Progress Review    OT orders acknowledged. Pt here with OP status and per PT, pt up moving well and completing functional transfers with SBA-mod (I). Writer provided education on OT role and POC. Pt reported being mod (I) with LE dressing and toileting since surgery. Pt also reported already having obtained shower chair and was able to independently state adapted technique for completing transfer. Pt with no ADL related concerns. No acute OT needs; will defer OT evaluation and complete orders. Please re-order if situation changes.

## 2018-03-02 NOTE — UTILIZATION REVIEW
[facility]   Admission Status; Secondary Review Determination   Admission Date: 3/1/2018  Under the authority of the Utilization Management Committee, the utilization review process indicated a secondary review on the above patient. The review outcome is based on review of the medical records, discussions with staff, and applying clinical experience noted on the date of the review.   () Outpatient Status Appropriate - This patient does not meet hospital inpatient criteria. If this patient's primary payer is Medicare and was admitted as an inpatient, Condition Code 44 should be used and patient status changed to outpatient recovery.  (X) Inpatient Status Appropriate    RATIONALE FOR DETERMINATION   Caesar Richardson is a 37 year old male with history of CML s/p BMT now in remission who underwent bilateral knee compartmental arthroplasty on 3/1/18.  Patient was admitted to the hospital after the procedure for monitoring.  The procedure is not on the inpatient list. The care team agreed that the patient could be observed for bleeding and recover in outpatient setting. He is stable for discharge today.  The severity of illness, intensity of service provided, expected LOS and risk for adverse outcome doesn't meet inpatient hospital admission.   The information on this document is developed by the utilization review team in order for the business office to ensure compliance. This only denotes the appropriateness of proper admission status and does not reflect the quality of care rendered.   The definitions of Inpatient Status and Observation Status used in making the determination above are those provided in the CMS Coverage Manual, Chapter 1 and Chapter 6, section 70.4.   Sincerely,   Delia Apodaca MD  Physician Advisor   Utilization Management   [facility].    ADDENDUM:  Patient noted increased pain in left knee with some bleeding on exam.  Pain control not adequate and given patient  history of BMT with increased risk of infection and concern for bleeding, further hospitalization/monitoring indicated.  Pt not stable for discharge.  IP status appropriate at this time.

## 2018-03-02 NOTE — PROGRESS NOTES
"ORTHOPAEDIC SURGERY    S: No acute overnight events. Resting comfortably in bed, pain well controlled, denies n/v/f/c/sob/cp, denies new n/t    O:  /50 (BP Location: Right arm)  Temp 95.8  F (35.4  C) (Oral)  Resp 11  Ht 1.727 m (5' 8\")  Wt 90.9 kg (200 lb 6.4 oz)  SpO2 94%  BMI 30.47 kg/m2  Gen: A&Ox3, no acute distress  CV: 2+ dp/pt pulses, capillary refill < 2sec  Resp: breathing equal and non-labored, no wheezing  Extremities:  BLE: dressing cdi, toes wwp, dp palp, silt s/s/sp/dp/t, fires el/fhl/gsc/ta    Hemoglobin   Date Value Ref Range Status   10/25/2017 13.9 13.3 - 17.7 g/dL Final   07/26/2017 14.9 13.3 - 17.7 g/dL Final     A/P: Caesar Richardson is a 37 year old male s/p b/l medial UKA with Dr Turner on 3/1/18, doing well    -Weight Bearing Status: WBAT  -Anticoagulation:  daily  -Pain control: IV and PO, wean to PO as able  -Activity: as stone  -Daily dressing changes  -Diet: adat  -ancef x 24 hrs  -PT/OT  -Disposition: to home today    Future Appointments  Date Time Provider Department Center   3/2/2018 6:30 AM UR OT OVERFLOW [facility] [place]   3/2/2018 6:30 AM UR PT OVERFLOW URPT [place]   3/2/2018 6:30 PM UR PT OVERFLOW URPT [place]   3/15/2018 10:15 AM Milagros Valencia, JASON CNP [facility]   4/9/2018 11:15 AM Pradeep Turner MD [facility]   4/11/2018 10:30 AM  [facility] LAB DRAW ONL [facility]   4/11/2018 11:00 AM  [facility]       Luis Daniel Olivarez MD  Orthopaedic Surgery PGY-4  Pager:  [phone]      "

## 2018-03-02 NOTE — PROGRESS NOTES
Pt seen, case reviewed with team  Pt notes increased B knee pain today  No chest pain or SOB  + flatus, - BM  Voiding without difficulty    VSS  BP 100s/    Alert, pleasant, comfortable while lying in bed  Lungs clear  CV rrr  Abd soft  Both LE wrapped, no calf tenderness or edema      Results for GRACIE CORONEL (MRN [id number]) as of 3/2/2018 14:15   Ref. Range 3/2/2018 06:32   Sodium Latest Ref Range: 133 - 144 mmol/L 142   Potassium Latest Ref Range: 3.4 - 5.3 mmol/L 5.0   Chloride Latest Ref Range: 94 - 109 mmol/L 111 (H)   Carbon Dioxide Latest Ref Range: 20 - 32 mmol/L 26   Urea Nitrogen Latest Ref Range: 7 - 30 mg/dL 15   Creatinine Latest Ref Range: 0.66 - 1.25 mg/dL 1.00   GFR Estimate Latest Ref Range: >60 mL/min/1.7m2 84   GFR Estimate If Black Latest Ref Range: >60 mL/min/1.7m2 >90   Calcium Latest Ref Range: 8.5 - 10.1 mg/dL 8.8   Anion Gap Latest Ref Range: 3 - 14 mmol/L 5   Glucose Latest Ref Range: 70 - 99 mg/dL 156 (H)   WBC Latest Ref Range: 4.0 - 11.0 10e9/L 14.2 (H)   Hemoglobin Latest Ref Range: 13.3 - 17.7 g/dL 12.7 (L)   Hematocrit Latest Ref Range: 40.0 - 53.0 % 38.7 (L)   Platelet Count Latest Ref Range: 150 - 450 10e9/L 163   RBC Count Latest Ref Range: 4.4 - 5.9 10e12/L 4.17 (L)   MCV Latest Ref Range: 78 - 100 fl 93   MCH Latest Ref Range: 26.5 - 33.0 pg 30.5   MCHC Latest Ref Range: 31.5 - 36.5 g/dL 32.8   RDW Latest Ref Range: 10.0 - 15.0 % 13.3       Assessment    S/p B unicompartmental knee arthroplasties.  Increased pain today as blocks have worn off    COPD stable    Leukocytosis, presumably stress related.  Last WBC in Epic on 10/25/17 was 11,900    Plan   Consider trial of ketorolac  CBC with diff in am  ASA for DVT proph

## 2018-03-02 NOTE — PLAN OF CARE
Problem: Patient Care Overview  Goal: Plan of Care/Patient Progress Review  Outcome: Therapy, progress toward functional goals as expected  Discharge Planner PT   Patient plan for discharge: home with family assist  Current status: SBA for mobility using WW  Barriers to return to prior living situation: none; highly motivated & strong family support  Recommendations for discharge: home with family   Rationale for recommendations: good progress with mobility skills       Entered by: Meenakshi Arechiga 03/02/2018 9:45 AM

## 2018-03-02 NOTE — PROGRESS NOTES
Care Coordinator- Discharge Planning     Admission Date/Time:  3/1/2018  Attending MD:  Pradeep Turner MD     Data  Date of initial CC assessment:  3/2/2018  Chart reviewed, discussed with interdisciplinary team.   Patient was admitted for:   1. Pain         Assessment  Concerns with insurance coverage for discharge needs: None.  Current Living Situation: Patient lives with family.  Support System: Supportive and Involved family.  Services Involved: NA  Transportation: Family or Friend will provide  Barriers to Discharge: None      Coordination of Care and Referrals: Met briefly with patient at bedside, gave patient Medicare Observation letter. Patient will be staying with his parents in [place] over the weekend. He will be returning to [place] next week. At this time there doesn't appear to be any need for home therapies or outpatient therapies. This writer will see patient this afternoon for discharge questions he has. CC to follow.      Plan  Anticipated Discharge Date:  3/3/2018  Anticipated Discharge Plan:  Home     CTS Handoff completed:  YES    Bina Fay RN, BSN  Care Coordinator, 8A  Phone [phone]  Pager [phone]

## 2018-03-02 NOTE — PLAN OF CARE
Problem: Knee Arthroplasty (Total, Partial) (Adult)  Goal: Signs and Symptoms of Listed Potential Problems Will be Absent, Minimized or Managed (Knee Arthroplasty)  Signs and symptoms of listed potential problems will be absent, minimized or managed by discharge/transition of care (reference Knee Arthroplasty (Total, Partial) (Adult) CPG).     VS: Soft BP. Other vitals stable   O2: Sats at upper 90% in room air.   Output: Voided spontaneously without difficulty.   Last BM: 2/28/18 and passing gas   Activity: WBaT. Standby assist with walker   Skin: intact   Pain: Managed with PRN oxycodone 5mg    CMS: intact   Dressing: C/D/I   Diet: Regular   LDA: PIV at L hand: patent and SL.   Equipment: PCD, CAPNO, IV pole and FWW   Plan: TBD.    Additional Info:

## 2018-03-02 NOTE — PLAN OF CARE
Problem: Surgery Nonspecified (Adult)  Goal: Signs and Symptoms of Listed Potential Problems Will be Absent, Minimized or Managed (Surgery Nonspecified)  Signs and symptoms of listed potential problems will be absent, minimized or managed by discharge/transition of care (reference Surgery Nonspecified (Adult) CPG).          VS:       Pt A/O X 4. Afebrile. VSS. Lungs- clear bilaterally with both       anterior and posterior. IS encouraged. Denies nausea, shortness of breath, and chest pain.     Output:       Bowels- active in all four quadrants. Last BM 3/1 per pt report. Voids spontaneously without difficulty in the bathroom.      Activity:       Pt up in room, in hallways, and to bathroom with assist of standby and walker.     Skin:   Incisions to BLEs, otherwise intact.      Pain:       Has pain in bilateral knees and given prn Oxycodone, prn Atarax and is tolerating well. Pt is declining ICE at this time.       CMS:       CMS and Neuro's are intact. Denies numbness and tingling in all extremities.      Dressing:       BLE incisional dressings are clean, dry, and intact.      Diet:       Pt is on a regular diet and appetite was good this shift.       LDA:       PIV is patent in the left hand and SL.      Equipment:       PCD's on BLE's. Bilateral heels are elevated off the bed.     Plan:       Pt is able to make needs known and the call light is within the pt's reach. Continue to monitor.       Additional Info:       Pt to discharge home tomorrow.

## 2018-03-02 NOTE — PHARMACY
Prescriber Notification Note    The pharmacist has communicated with this patient's provider regarding a concern or therapy recommendation.    Notified Person: Dr. Mary Beavers (ortho resident on call)  Date/Time of Notification: 3/1/18 @ 2057  Interaction: phone     Concern/Recommendation:  What the plan is for DVT prophylaxis is not indicated in Dr. Turner's notes. No DVT prophylaxis currently ordered for patient s/p bilateral medial compartment knee arthroplasty today. Tried paging Dr. Turner earlier in evening, no response.     Comments/Additional Details:  -Discussed above with Dr. Beavers. She agrees that the notes are not clear with what the DVT prophylaxis plan is. Given the surgery type, she is ok with holding off on DVT tonight. The ortho team can follow up on this in the morning if needed.     Lynsey Michaud, PharmD, BCPS

## 2018-03-02 NOTE — PLAN OF CARE
Problem: Knee Arthroplasty (Total, Partial) (Adult)  Goal: Signs and Symptoms of Listed Potential Problems Will be Absent, Minimized or Managed (Knee Arthroplasty)  Signs and symptoms of listed potential problems will be absent, minimized or managed by discharge/transition of care (reference Knee Arthroplasty (Total, Partial) (Adult) CPG).  Outcome: Improving    VS: Stable.   O2: RA.   Output: Voiding adequate amounts in bathroom.   Last BM: 2/28/18.   Activity: SBA with walker, WBAT.   Skin: Intact except incisions and bruise on left hip.   Pain: Constant pressure in knees bilaterally managed with oxycodone 5-10 mg q3h last given at 2220 and atarax 25 mg q6h last given at 1915.   CMS: Intact.   Dressing: CDI.   Diet: Regular.   LDA: PIV left hand SL.   Equipment: Capno, PCDs, walker, call light within reach.   Plan: Continue to monitor.   Additional Info:

## 2018-03-02 NOTE — PROGRESS NOTES
Pt notes ongoing pain, L leg more so than r knee. Dressing change done, light oozing noted distal wound. No hematoma noted Otherwise moving well from therapy standpoint.   Dr Turner notified of pt wound.

## 2018-03-03 ENCOUNTER — APPOINTMENT (OUTPATIENT)
Dept: PHYSICAL THERAPY | Facility: CLINIC | Age: 38
DRG: 462 | End: 2018-03-03
Attending: ORTHOPAEDIC SURGERY
Payer: MEDICARE

## 2018-03-03 VITALS
WEIGHT: 200.4 LBS | RESPIRATION RATE: 16 BRPM | HEIGHT: 68 IN | OXYGEN SATURATION: 97 % | SYSTOLIC BLOOD PRESSURE: 110 MMHG | DIASTOLIC BLOOD PRESSURE: 60 MMHG | BODY MASS INDEX: 30.37 KG/M2 | TEMPERATURE: 97.4 F

## 2018-03-03 LAB
ANION GAP SERPL CALCULATED.3IONS-SCNC: 3 MMOL/L (ref 3–14)
BASOPHILS # BLD AUTO: 0 10E9/L (ref 0–0.2)
BASOPHILS NFR BLD AUTO: 0.1 %
BUN SERPL-MCNC: 18 MG/DL (ref 7–30)
CALCIUM SERPL-MCNC: 7.9 MG/DL (ref 8.5–10.1)
CHLORIDE SERPL-SCNC: 110 MMOL/L (ref 94–109)
CO2 SERPL-SCNC: 28 MMOL/L (ref 20–32)
CREAT SERPL-MCNC: 0.97 MG/DL (ref 0.66–1.25)
DIFFERENTIAL METHOD BLD: ABNORMAL
EOSINOPHIL # BLD AUTO: 0.1 10E9/L (ref 0–0.7)
EOSINOPHIL NFR BLD AUTO: 0.6 %
ERYTHROCYTE [DISTWIDTH] IN BLOOD BY AUTOMATED COUNT: 13.6 % (ref 10–15)
GFR SERPL CREATININE-BSD FRML MDRD: 86 ML/MIN/1.7M2
GLUCOSE SERPL-MCNC: 71 MG/DL (ref 70–99)
HCT VFR BLD AUTO: 39.5 % (ref 40–53)
HGB BLD-MCNC: 12.6 G/DL (ref 13.3–17.7)
IMM GRANULOCYTES # BLD: 0 10E9/L (ref 0–0.4)
IMM GRANULOCYTES NFR BLD: 0.2 %
LYMPHOCYTES # BLD AUTO: 3.7 10E9/L (ref 0.8–5.3)
LYMPHOCYTES NFR BLD AUTO: 41.5 %
MCH RBC QN AUTO: 29.9 PG (ref 26.5–33)
MCHC RBC AUTO-ENTMCNC: 31.9 G/DL (ref 31.5–36.5)
MCV RBC AUTO: 94 FL (ref 78–100)
MONOCYTES # BLD AUTO: 1 10E9/L (ref 0–1.3)
MONOCYTES NFR BLD AUTO: 11.4 %
NEUTROPHILS # BLD AUTO: 4.1 10E9/L (ref 1.6–8.3)
NEUTROPHILS NFR BLD AUTO: 46.2 %
NRBC # BLD AUTO: 0 10*3/UL
NRBC BLD AUTO-RTO: 0 /100
PLATELET # BLD AUTO: 154 10E9/L (ref 150–450)
POTASSIUM SERPL-SCNC: 4.2 MMOL/L (ref 3.4–5.3)
RBC # BLD AUTO: 4.21 10E12/L (ref 4.4–5.9)
SODIUM SERPL-SCNC: 141 MMOL/L (ref 133–144)
WBC # BLD AUTO: 8.9 10E9/L (ref 4–11)

## 2018-03-03 PROCEDURE — 85025 COMPLETE CBC W/AUTO DIFF WBC: CPT | Performed by: INTERNAL MEDICINE

## 2018-03-03 PROCEDURE — 80048 BASIC METABOLIC PNL TOTAL CA: CPT | Performed by: INTERNAL MEDICINE

## 2018-03-03 PROCEDURE — 40000193 ZZH STATISTIC PT WARD VISIT

## 2018-03-03 PROCEDURE — 97110 THERAPEUTIC EXERCISES: CPT | Mod: GP

## 2018-03-03 PROCEDURE — 36415 COLL VENOUS BLD VENIPUNCTURE: CPT | Performed by: INTERNAL MEDICINE

## 2018-03-03 PROCEDURE — A9270 NON-COVERED ITEM OR SERVICE: HCPCS | Mod: GY | Performed by: ORTHOPAEDIC SURGERY

## 2018-03-03 PROCEDURE — 85018 HEMOGLOBIN: CPT | Performed by: INTERNAL MEDICINE

## 2018-03-03 PROCEDURE — 97116 GAIT TRAINING THERAPY: CPT | Mod: GP

## 2018-03-03 PROCEDURE — 97530 THERAPEUTIC ACTIVITIES: CPT | Mod: GP

## 2018-03-03 PROCEDURE — 25000128 H RX IP 250 OP 636: Performed by: INTERNAL MEDICINE

## 2018-03-03 PROCEDURE — 25000132 ZZH RX MED GY IP 250 OP 250 PS 637: Mod: GY | Performed by: ORTHOPAEDIC SURGERY

## 2018-03-03 RX ORDER — CELECOXIB 100 MG/1
100 CAPSULE ORAL 2 TIMES DAILY
Qty: 20 CAPSULE | Refills: 1 | Status: SHIPPED | OUTPATIENT
Start: 2018-03-03 | End: 2018-03-15

## 2018-03-03 RX ADMIN — ASPIRIN 325 MG ORAL TABLET 325 MG: 325 PILL ORAL at 07:33

## 2018-03-03 RX ADMIN — OXYCODONE HYDROCHLORIDE 5 MG: 5 TABLET ORAL at 00:43

## 2018-03-03 RX ADMIN — KETOROLAC TROMETHAMINE 30 MG: 30 INJECTION, SOLUTION INTRAMUSCULAR at 03:03

## 2018-03-03 RX ADMIN — UMECLIDINIUM 1 PUFF: 62.5 AEROSOL, POWDER ORAL at 07:34

## 2018-03-03 RX ADMIN — OXYCODONE HYDROCHLORIDE 10 MG: 5 TABLET ORAL at 04:33

## 2018-03-03 RX ADMIN — FLUTICASONE FUROATE AND VILANTEROL TRIFENATATE 1 PUFF: 200; 25 POWDER RESPIRATORY (INHALATION) at 07:34

## 2018-03-03 RX ADMIN — OXYCODONE HYDROCHLORIDE 5 MG: 5 TABLET ORAL at 07:32

## 2018-03-03 RX ADMIN — OXYCODONE HYDROCHLORIDE 5 MG: 5 TABLET ORAL at 10:41

## 2018-03-03 RX ADMIN — OXYCODONE HYDROCHLORIDE 5 MG: 5 TABLET ORAL at 08:06

## 2018-03-03 NOTE — PLAN OF CARE
Problem: Knee Arthroplasty (Total, Partial) (Adult)  Goal: Signs and Symptoms of Listed Potential Problems Will be Absent, Minimized or Managed (Knee Arthroplasty)  Signs and symptoms of listed potential problems will be absent, minimized or managed by discharge/transition of care (reference Knee Arthroplasty (Total, Partial) (Adult) CPG).   Outcome: Improving    VS: Labile, BP's are trending WDL - will monitor pt asymptomatic, afebrile.   O2: Stable in mid to upper 90's on room air   Output: Voids adequate amounts without issue in bathroom   Last BM: 2.28.18 per pt report - has been requesting PRN Senna   Activity: Up standby assist, weight bearing as tolerated. In chair for evening meals. Ambulated down cervantes this shift.    Skin: Intact ex incisions.   Pain: Managed with PRN oxycodone 10mg every 3hrs, scheduled Toradol. Pt endorses relief.    CMS: Intact denies numbness and tingling - CMS and Neuro's are intact   Dressing: CDI   Diet: Regular, appetite is good - tolerates oral intake   LDA: PIV is saline locked.   Equipment:    Plan: Anticipate dc to home tomorrow pending therapies and medical clearance   Additional Info:

## 2018-03-03 NOTE — PROGRESS NOTES
Focus: patients discharge to home  Db: Patient to be discharged to home per Drs orders.   I: Gave patient written and oral instructions about med's, diet, activity, follow up cares, hygiene cares and numbers to call for questions or concerns.   E: Patient seemed to have a full understanding of plan of cares for discharge. Patient was discharged on Asprin for a anticoagulant and patient was discharge with all other med's filled here at the hospital except for Celebrex(Pt took hard copy of this med.) Patient had all belongings returned to him prior to discharge. Patient was escorted to front door in wheelchair to be discharged to home with parents.

## 2018-03-03 NOTE — PROGRESS NOTES
ORTHOPAEDIC DISCHARGE SUMMARY     Date of Admission: 3/1/2018  Date of Discharge: 3/3/2018  Disposition: Home  Staff Physician: Pradeep Turner MD  Primary Care Provider: Sebas Leon    DISCHARGE DIAGNOSIS:  Osteoarthritis    PROCEDURES: Procedure(s):  ARTHROPLASTY KNEE UNICOMPARTMENT BILATERAL on 3/1/2018    BRIEF HISTORY:  36 yo m with bilateral medial knee OA presented for bilateral UKA.    HOSPITAL COURSE:    Surgery was uncomplicated. Caesar Richardson has done well post-operatively. Medicine was consulted post operatively to aid in management of medical comorbidities. See final recommendations below. The patient received routine nursing cares and is medically stable. Vital signs are stable. The patient is tolerating a regular diet without GI distress/nausea or vomiting. Voiding spontaneously. All PT/OT goals have been met for safe mobility. Pain is now controlled on oral medications which will be available on discharge. Stool softeners have been used while taking pain medications to help prevent constipation. Caesar Richardson is deemed medically safe to discharge.     Antibiotics:   given periop and 24 hours postop.   DVT prophylaxis:  ASA 325mg daily for 4 weeks  PT Progress:  Has met PT/OT goals for safe mobility.    Pain Meds:  Weaned off all IV pain meds by discharge.  Inpatient Events: No significant events or complications.     Discharge orders and instructions as below.    FOLLOWUP:    Follow up with JASON Rees as scheduled below  Future Appointments  Date Time Provider Department Center   3/3/2018 10:15 AM Ligia Daniels, PT URPT [place]   3/15/2018 10:15 AM Milagros Valencia APRN [facility]   4/9/2018 11:15 AM Pradeep Turner MD [facility]   4/11/2018 10:30 AM  [facility]   4/11/2018 11:00 AM  [facility]       Appointments on [facility]. Call [phone] if you haven't heard regarding these  appointments within 7 days of discharge.    PLANNED DISCHARGE ORDERS:           Current Discharge Medication List      START taking these medications    Details   order for DME cold packs for home use  Qty: 2 Units, Refills: 0      senna-docusate (SENOKOT-S;PERICOLACE) 8.6-50 MG per tablet Take 1 tablet by mouth 2 times daily as needed for constipation  Qty: 100 tablet, Refills: 1    Associated Diagnoses: Pain      aspirin  MG EC tablet Take 1 tablet (325 mg) by mouth daily  Qty: 30 tablet, Refills: 0    Comments: For DVT prophylaxis, to prevent blood clots  Associated Diagnoses: Pain      oxyCODONE IR (ROXICODONE) 5 MG tablet Take 1-2 tablets (5-10 mg) by mouth every 4 hours as needed for other (pain control or improvement in physical function. Hold dose for analgesic side effects.)  Qty: 80 tablet, Refills: 0    Associated Diagnoses: Pain      acetaminophen (TYLENOL) 325 MG tablet Take 1-2 tablets every 4 hours as needed for pain.  Qty: 100 tablet, Refills: 1    Associated Diagnoses: Pain      hydrOXYzine (ATARAX) 25 MG tablet Take 1 tablet (25 mg) by mouth every 6 hours as needed for itching  Qty: 60 tablet, Refills: 0    Associated Diagnoses: S/P bilateral unicompartmental knee replacement         CONTINUE these medications which have NOT CHANGED    Details   tiotropium (SPIRIVA HANDIHALER) 18 MCG capsule Inhale contents of one capsule daily.  Qty: 30 capsule, Refills: 12    Associated Diagnoses: Panlobular emphysema (H); CML (chronic myelocytic leukemia) (H); Anxiety; Cough; Status post allogeneic bone marrow transplant (H); Sgjve-zyfrek-xdcr disease, unspecified; Diminished libido; Erectile dysfunction, unspecified erectile dysfunction type; Heartburn      fluticasone-salmeterol (ADVAIR) 250-50 MCG/DOSE diskus inhaler Inhale 1 puff into the lungs 2 times daily  Qty: 1 Inhaler, Refills: 12    Associated Diagnoses: Panlobular emphysema (H); CML (chronic myelocytic leukemia) (H); Anxiety; Cough; Status post  allogeneic bone marrow transplant (H); Cqwye-neohgl-pbng disease, unspecified; Diminished libido; Erectile dysfunction, unspecified erectile dysfunction type; Heartburn      diclofenac (VOLTAREN) 1 % GEL Apply topically 4 times daily  Qty: 2 Tube, Refills: 6    Associated Diagnoses: CML (chronic myelocytic leukemia) (H); Panlobular emphysema (H); Anxiety; Cough; Status post allogeneic bone marrow transplant (H); Iimdn-dkjwnj-mwon disease, unspecified; Diminished libido; Erectile dysfunction, unspecified erectile dysfunction type; Heartburn      traZODone (DESYREL) 150 MG tablet Take 1 tablet (150 mg) by mouth At Bedtime  Qty: 90 tablet, Refills: 6    Associated Diagnoses: Anxiety; CML (chronic myelocytic leukemia) (H); Wrwks-fbggqs-axfk disease, unspecified; Panlobular emphysema (H); Heartburn; Cough; Erectile dysfunction, unspecified erectile dysfunction type; Status post allogeneic bone marrow transplant (H); Diminished libido               Discharge Procedure Orders  Reason for your hospital stay   Order Comments: You had bilateral knee surgery     Activity   Order Comments: Your activity upon discharge: as tolerated. Do the exercises as instructed by therapy during your hospital stay   Order Specific Question Answer Comments   Is discharge order? Yes      When to contact your care team   Order Comments: CALL YOUR PHYSICIAN IF:  1.  Your pain begins to worsen and is unable to be controlled with your medications.  2.  Excessive redness or drainage of cloudy or bloody material from the wounds (Clear red tinted fluid and some mild drainage should be expected). Drainage of any kind 5 days after surgery should be reported to the doctor.  3.  You have a temperature elevation greater than 101.5    4.  You have pain, swelling or redness in your calf. You have numbness or weakness in your leg or foot.    During regular business hours call the [facility] at [phone] and ask for the triage nurse.  After hours or weekends call  the hospital  at [phone] and ask for the ortho resident on call.     Wound care and dressings   Order Comments: Instructions to care for your wound at home: Change your dressing daily.  After 5 days. you may shower with your wound un covered as long as it is clean and dry. Do not scrub your wound. Pat the wound dry and replace your dressing. Keep a dressing on until your two week follow up. . Cover the wound to shower if it is not clean and dry. Call the clinic if you notice any drainage.  If you have staples on your incision, then keep you wound covered for showers until removed at the two week post op visit.     Discharge Instructions   Order Comments: If you had your whole knee replaced or revised and were given a CPM, you should continue to use your CPM every day for 2 weeks or until at 90 degrees comfortably. Use for 4-6 hours per day.     Follow Up and recommended labs and tests   Order Comments: Follow up with Milagros Valencia on 3/15/18 at 1015am at:    Orthopaedic Clinic  [facility]  Floor [address]  [address] [place] [address]    Appointments: [phone]     Full Code     Diet   Order Comments: Follow this diet upon discharge: Regular   Order Specific Question Answer Comments   Is discharge order? Yes      Assign Questionnaire Series to Patient           Signed:   Wagner Carr MD  Adult Reconstructive Surgery Fellow  Dept Orthopaedic Surgery, [facility]

## 2018-03-03 NOTE — PROGRESS NOTES
Focus: patients status  DB: patient is hoping to go home today.   I:Talked with patient about pain management, use of I S and bowel status.  E: patient seems to have a good understanding of plan of cares. Status of patient will continue to be monitored.

## 2018-03-03 NOTE — PROGRESS NOTES
"ORTHOPAEDIC SURGERY    S: No acute overnight events. Resting comfortably in bed, pain well controlled, denies n/v/f/c/sob/cp, denies new n/t    O:  /60 (BP Location: Right arm)  Temp 97.4  F (36.3  C) (Oral)  Resp 16  Ht 1.727 m (5' 8\")  Wt 90.9 kg (200 lb 6.4 oz)  SpO2 97%  BMI 30.47 kg/m2  Gen: A&Ox3, no acute distress  CV: 2+ dp/pt pulses, capillary refill < 2sec  Resp: breathing equal and non-labored, no wheezing  Extremities:  BLE: dressing cdi, toes wwp, dp palp, silt s/s/sp/dp/t, fires el/fhl/gsc/ta    Hemoglobin   Date Value Ref Range Status   03/03/2018 12.6 (L) 13.3 - 17.7 g/dL Final   03/02/2018 12.7 (L) 13.3 - 17.7 g/dL Final     A/P: Caesar Richardson is a 37 year old male s/p b/l medial UKA with Dr Turner on 3/1/18, doing well    -Weight Bearing Status: WBAT  -Anticoagulation:  daily  -Pain control: IV and PO, wean to PO as able  -Activity: as stone  -Daily dressing changes  -Diet: adat  -ancef x 24 hrs  -PT/OT  -Disposition: to home today    Future Appointments  Date Time Provider Department Center   3/2/2018 6:30 AM UR OT OVERFLOW [facility]   3/2/2018 6:30 AM UR PT OVERFLOW [facility]   3/2/2018 6:30 PM UR PT OVERFLOW [facility]   3/15/2018 10:15 AM Milagros Valencia, JASON CNP [facility]   4/9/2018 11:15 AM Pradeep Turner MD [facility]   4/11/2018 10:30 AM  [facility] LAB DRAW ONL [facility]   4/11/2018 11:00 AM  BMT DOM UCBMT [facility]         Signed:   Wagner Carr MD  Adult Reconstructive Surgery Fellow  Dept Orthopaedic Surgery, [facility]        "

## 2018-03-03 NOTE — PLAN OF CARE
Problem: Patient Care Overview  Goal: Plan of Care/Patient Progress Review  Outcome: Improving  VSS. AFebrile. Up with walker and 1 assist to bathroom and voiding adequately. C/o pain and medicated with relief. Drsgs with ace wrapped and c/d/i. Ice to bilaterally knees. Will continue to monitor.

## 2018-03-03 NOTE — PLAN OF CARE
Problem: Patient Care Overview  Goal: Plan of Care/Patient Progress Review  PT / 8A -    Discharge Planner PT   Patient plan for discharge: home with assist  Current status: Performing bed mobility and transfers + MOD I/supervision.  Amb ~ 200 with FWW + supervision. Climbed stairs with B UE support + supervision.  Performed seated LE exercises.  Barriers to return to prior living situation: none  Recommendations for discharge: home with assist + HEP  Rationale for recommendations: Pt is performing all functional mobility + MOD I/supervision.  Pt is motivated and has HEP to perform to continue progressing LE ROM/strength for improved functional mobility.       Entered by: Ligia Daniels 03/03/2018 10:43 AM       Physical Therapy Discharge Summary    Reason for therapy discharge:    All goals and outcomes met, no further needs identified.    Progress towards therapy goal(s). See goals on Care Plan in [facility] electronic health record for goal details.  Goals met    Therapy recommendation(s):    Continue home exercise program.

## 2018-03-04 ENCOUNTER — HOSPITAL ENCOUNTER (EMERGENCY)
Facility: CLINIC | Age: 38
Discharge: HOME | End: 2018-03-04
Attending: EMERGENCY MEDICINE | Admitting: EMERGENCY MEDICINE
Payer: MEDICARE

## 2018-03-04 ENCOUNTER — NURSE TRIAGE (OUTPATIENT)
Dept: NURSING | Facility: CLINIC | Age: 38
End: 2018-03-04

## 2018-03-04 ENCOUNTER — APPOINTMENT (OUTPATIENT)
Dept: ULTRASOUND IMAGING | Facility: CLINIC | Age: 38
End: 2018-03-04
Attending: EMERGENCY MEDICINE
Payer: MEDICARE

## 2018-03-04 VITALS
RESPIRATION RATE: 18 BRPM | HEART RATE: 75 BPM | DIASTOLIC BLOOD PRESSURE: 68 MMHG | OXYGEN SATURATION: 98 % | SYSTOLIC BLOOD PRESSURE: 101 MMHG | TEMPERATURE: 98.1 F

## 2018-03-04 DIAGNOSIS — Z96.653 S/P BILATERAL UNICOMPARTMENTAL KNEE REPLACEMENT: ICD-10-CM

## 2018-03-04 LAB
ANION GAP SERPL CALCULATED.3IONS-SCNC: 5 MMOL/L (ref 3–14)
BASOPHILS # BLD AUTO: 0 10E9/L (ref 0–0.2)
BASOPHILS NFR BLD AUTO: 0.2 %
BUN SERPL-MCNC: 19 MG/DL (ref 7–30)
CALCIUM SERPL-MCNC: 8 MG/DL (ref 8.5–10.1)
CHLORIDE SERPL-SCNC: 108 MMOL/L (ref 94–109)
CO2 SERPL-SCNC: 27 MMOL/L (ref 20–32)
CREAT SERPL-MCNC: 1.02 MG/DL (ref 0.66–1.25)
CRP SERPL-MCNC: 57.9 MG/L (ref 0–8)
DIFFERENTIAL METHOD BLD: ABNORMAL
EOSINOPHIL # BLD AUTO: 0.1 10E9/L (ref 0–0.7)
EOSINOPHIL NFR BLD AUTO: 1.6 %
ERYTHROCYTE [DISTWIDTH] IN BLOOD BY AUTOMATED COUNT: 13.4 % (ref 10–15)
ERYTHROCYTE [SEDIMENTATION RATE] IN BLOOD BY WESTERGREN METHOD: 17 MM/H (ref 0–15)
GFR SERPL CREATININE-BSD FRML MDRD: 82 ML/MIN/1.7M2
GLUCOSE SERPL-MCNC: 134 MG/DL (ref 70–99)
HCT VFR BLD AUTO: 36.4 % (ref 40–53)
HGB BLD-MCNC: 12.1 G/DL (ref 13.3–17.7)
IMM GRANULOCYTES # BLD: 0 10E9/L (ref 0–0.4)
IMM GRANULOCYTES NFR BLD: 0.1 %
INR PPP: 0.93 (ref 0.86–1.14)
LYMPHOCYTES # BLD AUTO: 3 10E9/L (ref 0.8–5.3)
LYMPHOCYTES NFR BLD AUTO: 36.8 %
MCH RBC QN AUTO: 30.6 PG (ref 26.5–33)
MCHC RBC AUTO-ENTMCNC: 33.2 G/DL (ref 31.5–36.5)
MCV RBC AUTO: 92 FL (ref 78–100)
MONOCYTES # BLD AUTO: 0.7 10E9/L (ref 0–1.3)
MONOCYTES NFR BLD AUTO: 7.9 %
NEUTROPHILS # BLD AUTO: 4.4 10E9/L (ref 1.6–8.3)
NEUTROPHILS NFR BLD AUTO: 53.4 %
NRBC # BLD AUTO: 0 10*3/UL
NRBC BLD AUTO-RTO: 0 /100
PLATELET # BLD AUTO: 167 10E9/L (ref 150–450)
POTASSIUM SERPL-SCNC: 3.6 MMOL/L (ref 3.4–5.3)
RBC # BLD AUTO: 3.95 10E12/L (ref 4.4–5.9)
SODIUM SERPL-SCNC: 140 MMOL/L (ref 133–144)
WBC # BLD AUTO: 8.2 10E9/L (ref 4–11)

## 2018-03-04 PROCEDURE — 85025 COMPLETE CBC W/AUTO DIFF WBC: CPT | Performed by: EMERGENCY MEDICINE

## 2018-03-04 PROCEDURE — 86140 C-REACTIVE PROTEIN: CPT | Performed by: EMERGENCY MEDICINE

## 2018-03-04 PROCEDURE — 85652 RBC SED RATE AUTOMATED: CPT | Performed by: EMERGENCY MEDICINE

## 2018-03-04 PROCEDURE — 93970 EXTREMITY STUDY: CPT

## 2018-03-04 PROCEDURE — 99284 EMERGENCY DEPT VISIT MOD MDM: CPT | Mod: 25 | Performed by: EMERGENCY MEDICINE

## 2018-03-04 PROCEDURE — 80048 BASIC METABOLIC PNL TOTAL CA: CPT | Performed by: EMERGENCY MEDICINE

## 2018-03-04 PROCEDURE — 85610 PROTHROMBIN TIME: CPT | Performed by: EMERGENCY MEDICINE

## 2018-03-04 PROCEDURE — 99284 EMERGENCY DEPT VISIT MOD MDM: CPT | Mod: Z6 | Performed by: EMERGENCY MEDICINE

## 2018-03-04 ASSESSMENT — ENCOUNTER SYMPTOMS
FEVER: 0
ANAL BLEEDING: 0
DIARRHEA: 0
BLOOD IN STOOL: 0
VOMITING: 0
NAUSEA: 0

## 2018-03-04 NOTE — DISCHARGE INSTRUCTIONS
Continue with discharge instructions as planned.    Follow-up with orthopedics as directed or you may call their clinic Monday morning for an appointment time to be seen this week as needed.    Return to the ER for worsening or fever.

## 2018-03-04 NOTE — ED AVS SNAPSHOT
[facility], Emergency Department    [address]    [place] [address]    Phone:  [phone]    Fax:  [phone]                                       Caesar Richardson   MRN: [id number]    Department:  [facility], Emergency Department   Date of Visit:  3/4/2018           Patient Information     Date Of Birth          1980        Your diagnoses for this visit were:     S/P bilateral unicompartmental knee replacement        You were seen by Chan Wright MD.        Discharge Instructions       Continue with discharge instructions as planned.    Follow-up with orthopedics as directed or you may call their clinic Monday morning for an appointment time to be seen this week as needed.    Return to the ER for worsening or fever.    Future Appointments        Provider Department Dept Phone Center    3/15/2018 10:15 AM JASON Zhang [facility] Orthopaedic [facility] [phone] [facility]    4/9/2018 11:15 AM Pradeep Turner MD [facility] Orthopaedic [facility] [phone] [facility]    4/11/2018 10:30 AM [facility] [phone] [facility]    4/11/2018 11:00 AM BMT DOM [facility] Blood and Marrow Transplant [phone] [facility]      24 Hour Appointment Hotline       To make an appointment at any [facility], call [phone] ([phone]). If you don't have a family doctor or clinic, we will help you find one. [facility] clinics are conveniently located to serve the needs of you and your family.             Review of your medicines      Our records show that you are taking the medicines listed below. If these are incorrect, please call your family doctor or clinic.        Dose / Directions Last dose taken    acetaminophen 325 MG tablet   Commonly known as:  TYLENOL   Quantity:  100 tablet        Take 1-2 tablets every 4 hours as needed for pain.   Refills:  1        aspirin  MG EC tablet   Dose:  325 mg   Quantity:  30 tablet        Take 1 tablet (325 mg) by  mouth daily   Refills:  0        celecoxib 100 MG capsule   Commonly known as:  celeBREX   Dose:  100 mg   Quantity:  20 capsule        Take 1 capsule (100 mg) by mouth 2 times daily for 10 days   Refills:  1        diclofenac 1 % Gel topical gel   Commonly known as:  VOLTAREN   Quantity:  2 Tube        Apply topically 4 times daily   Refills:  6        fluticasone-salmeterol 250-50 MCG/DOSE diskus inhaler   Commonly known as:  ADVAIR   Dose:  1 puff   Quantity:  1 Inhaler        Inhale 1 puff into the lungs 2 times daily   Refills:  12        hydrOXYzine 25 MG tablet   Commonly known as:  ATARAX   Dose:  25 mg   Quantity:  60 tablet        Take 1 tablet (25 mg) by mouth every 6 hours as needed for itching   Refills:  0        order for DME   Quantity:  2 Units        cold packs for home use   Refills:  0        oxyCODONE IR 5 MG tablet   Commonly known as:  ROXICODONE   Dose:  5-10 mg   Quantity:  80 tablet        Take 1-2 tablets (5-10 mg) by mouth every 4 hours as needed for other (pain control or improvement in physical function. Hold dose for analgesic side effects.)   Refills:  0        senna-docusate 8.6-50 MG per tablet   Commonly known as:  SENOKOT-S;PERICOLACE   Dose:  1 tablet   Quantity:  100 tablet        Take 1 tablet by mouth 2 times daily as needed for constipation   Refills:  1        tiotropium 18 MCG capsule   Commonly known as:  SPIRIVA HANDIHALER   Quantity:  30 capsule        Inhale contents of one capsule daily.   Refills:  12        traZODone 150 MG tablet   Commonly known as:  DESYREL   Dose:  150 mg   Quantity:  90 tablet        Take 1 tablet (150 mg) by mouth At Bedtime   Refills:  6                Procedures and tests performed during your visit     Basic metabolic panel    CBC with platelets differential    CRP inflammation    Erythrocyte sedimentation rate auto    INR    Special Care Nursing    US Lower Extremity Venous Duplex Bilateral      Orders Needing Specimen Collection     None       Pending Results     Date and Time Order Name Status Description    3/4/2018 0910 US Lower Extremity Venous Duplex Bilateral Preliminary             Pending Culture Results     No orders found from 3/2/2018 to 3/5/2018.            Pending Results Instructions     If you had any lab results that were not finalized at the time of your Discharge, you can call the ED Lab Result RN at [phone]. You will be contacted by this team for any positive Lab results or changes in treatment. The nurses are available 7 days a week from 10A to 6:30P.  You can leave a message 24 hours per day and they will return your call.        Thank you for choosing [facility]       Thank you for choosing [facility] for your care. Our goal is always to provide you with excellent care. Hearing back from our patients is one way we can continue to improve our services. Please take a few minutes to complete the written survey that you may receive in the mail after you visit with us. Thank you!        [employer] Information     [facility] gives you secure access to your electronic health record. If you see a primary care provider, you can also send messages to your care team and make appointments. If you have questions, please call your primary care clinic.  If you do not have a primary care provider, please call [phone] and they will assist you.        Care EveryWhere ID     This is your Care EveryWhere ID. This could be used by other organizations to access your [facility] medical records  [id number]        Equal Access to Services     ADALID CLEMENS : Kait smith Soneyda, waaxda luqadaha, qaybta kaalrosalee garrett. So [facility] [phone].    ATENCIÓN: Si habla español, tiene a st disposición servicios gratuitos de asistencia lingüística. Llame al [phone].    We comply with applicable federal civil rights laws and [place] laws. We do not discriminate on the basis of race, color, national  origin, age, disability, sex, sexual orientation, or gender identity.            After Visit Summary       This is your record. Keep this with you and show to your community pharmacist(s) and doctor(s) at your next visit.

## 2018-03-04 NOTE — TELEPHONE ENCOUNTER
Caesar was told if having numbness or tingling to come back in to ED per discharge instructions.  Today Caesar is having numbness and tingling and also has a hematoma.    FNA advised to return to ED and patient agreed..

## 2018-03-04 NOTE — ED AVS SNAPSHOT
[facility], [place], Emergency Department    [address]    [facility] [address]    Phone:  [phone]    Fax:  [phone]                                       Caesar Richardson   MRN: [id number]    Department:  [facility], Emergency Department   Date of Visit:  3/4/2018           After Visit Summary Signature Page     I have received my discharge instructions, and my questions have been answered. I have discussed any challenges I see with this plan with the nurse or doctor.    ..........................................................................................................................................  Patient/Patient Representative Signature      ..........................................................................................................................................  Patient Representative Print Name and Relationship to Patient    ..................................................               ................................................  Date                                            Time    ..........................................................................................................................................  Reviewed by Signature/Title    ...................................................              ..............................................  Date                                                            Time

## 2018-03-04 NOTE — ED PROVIDER NOTES
[facility] EMERGENCY DEPARTMENT ([facility])    3/04/18       History     Chief Complaint   Patient presents with     Post-op Problem     Having numbness and tingling in shins, post bilat knee arthroplasty     HPI  Caesar Richardson is a 37 year old male who presents to the ER with complaints of increasing pain in his left knee and left calf s/p bilateral knee arthroplasties done in the last 3 days.  Patient states that he was discharged home 2 days ago and states that he is to follow-up on the 15th.  Patient states however he has had increased swelling and pain, most notable in the left leg and presents here to the ER for evaluation.  Patient denies any fevers, denies any vomiting, diarrhea, melena or bright blood per rectum.    This part of the medical record was transcribed by Escobar Mckeon, Medical Scribe, from a dictation done by Chan Wright MD.       I have reviewed the Medications, Allergies, Past Medical and Surgical History, and Social History in the [employer] system.    Past Medical History:   Diagnosis Date     Arthritis      BMT (bone marrow transplant) 2006     CML (chronic myelocytic leukemia) (H)      COPD (chronic obstructive pulmonary disease) (H) 1/9/2013     GERD (gastroesophageal reflux disease)      Iqbde-elzjrm-yiyc disease (H) 7/21/2011     Problem list name updated by automated process. Provider to review     Rheumatoid arthritis (H) 7/21/2011     Problem list name updated by automated process. Provider to review       Past Surgical History:   Procedure Laterality Date     ARTHROPLASTY KNEE UNICOMPARTMENT BILATERAL Bilateral 3/1/2018    Procedure: ARTHROPLASTY KNEE UNICOMPARTMENT BILATERAL;  Bilateral Knee Uni Compartmental Arthroplasty;  Surgeon: Pradeep Turner MD;  Location: [facility] OR     BMT CELL PRODUCT INFUSION  2012     ORTHOPEDIC SURGERY  2007     TRANSPLANT  2006    BMT       Family History   Problem Relation Age of Onset     DIABETES Father      Arthritis Father      HEART  DISEASE Father      Respiratory Father      Allergies Mother      Respiratory Mother      Asthma Brother      Allergies Brother      DIABETES Paternal Grandmother        Social History   Substance Use Topics     Smoking status: Current Every Day Smoker     Packs/day: 1.00     Years: 25.00     Types: Cigarettes     Smokeless tobacco: Never Used      Comment: down to few cigs per week     Alcohol use No     Previous Medications    ACETAMINOPHEN (TYLENOL) 325 MG TABLET    Take 1-2 tablets every 4 hours as needed for pain.    ASPIRIN  MG EC TABLET    Take 1 tablet (325 mg) by mouth daily    CELECOXIB (CELEBREX) 100 MG CAPSULE    Take 1 capsule (100 mg) by mouth 2 times daily for 10 days    DICLOFENAC (VOLTAREN) 1 % GEL    Apply topically 4 times daily    FLUTICASONE-SALMETEROL (ADVAIR) 250-50 MCG/DOSE DISKUS INHALER    Inhale 1 puff into the lungs 2 times daily    HYDROXYZINE (ATARAX) 25 MG TABLET    Take 1 tablet (25 mg) by mouth every 6 hours as needed for itching    ORDER FOR DME    cold packs for home use    OXYCODONE IR (ROXICODONE) 5 MG TABLET    Take 1-2 tablets (5-10 mg) by mouth every 4 hours as needed for other (pain control or improvement in physical function. Hold dose for analgesic side effects.)    SENNA-DOCUSATE (SENOKOT-S;PERICOLACE) 8.6-50 MG PER TABLET    Take 1 tablet by mouth 2 times daily as needed for constipation    TIOTROPIUM (SPIRIVA HANDIHALER) 18 MCG CAPSULE    Inhale contents of one capsule daily.    TRAZODONE (DESYREL) 150 MG TABLET    Take 1 tablet (150 mg) by mouth At Bedtime        Allergies   Allergen Reactions     No Known Allergies          Review of Systems   Constitutional: Negative for fever.   Cardiovascular: Positive for leg swelling (bilateral).   Gastrointestinal: Negative for anal bleeding, blood in stool, diarrhea, nausea and vomiting.   Musculoskeletal:        Positive for bilateral leg pain   All other systems reviewed and are negative.      Physical Exam   BP:  130/77  Pulse: 57  Temp: 98.2  F (36.8  C)  Resp: 18  SpO2: 98 %      Physical Exam   Constitutional: He is oriented to person, place, and time. No distress.   HENT:   Head: Atraumatic.   Eyes: EOM are normal. Pupils are equal, round, and reactive to light.   Neck: Neck supple.   Pulmonary/Chest: No respiratory distress.   Musculoskeletal:   Bilateral legs reveal postsurgical changes of the knees.  Patient's incisions are clean there is some slight redness of the right anterior knee with some warmth as compared to the left knee but no definite cellulitis.  Distal CMS is intact.   Neurological: He is alert and oriented to person, place, and time.   Psychiatric: He has a normal mood and affect.       ED Course     ED Course   Comment Time   Discussed with Ortho to see and evaluate the patient in the ER 03/04 1014     Procedures        Results for orders placed or performed during the hospital encounter of 03/04/18   US Lower Extremity Venous Duplex Bilateral    Narrative    VENOUS ULTRASOUND BOTH LEGS  3/4/2018 10:00 AM     HISTORY: s/p knee surgery with increased swelling and pain, r/o DVT;     COMPARISON: None.    FINDINGS: Examination of the deep veins with graded compression and  color flow Doppler with spectral wave form analysis shows  no evidence  of thrombus in the common femoral vein, femoral vein, popliteal vein  or calf veins.  There is no venous insufficiency.      Impression    IMPRESSION: No evidence for DVT in either lower extremity.   CBC with platelets differential   Result Value Ref Range    WBC 8.2 4.0 - 11.0 10e9/L    RBC Count 3.95 (L) 4.4 - 5.9 10e12/L    Hemoglobin 12.1 (L) 13.3 - 17.7 g/dL    Hematocrit 36.4 (L) 40.0 - 53.0 %    MCV 92 78 - 100 fl    MCH 30.6 26.5 - 33.0 pg    MCHC 33.2 31.5 - 36.5 g/dL    RDW 13.4 10.0 - 15.0 %    Platelet Count 167 150 - 450 10e9/L    Diff Method Automated Method     % Neutrophils 53.4 %    % Lymphocytes 36.8 %    % Monocytes 7.9 %    % Eosinophils 1.6 %    %  Basophils 0.2 %    % Immature Granulocytes 0.1 %    Nucleated RBCs 0 0 /100    Absolute Neutrophil 4.4 1.6 - 8.3 10e9/L    Absolute Lymphocytes 3.0 0.8 - 5.3 10e9/L    Absolute Monocytes 0.7 0.0 - 1.3 10e9/L    Absolute Eosinophils 0.1 0.0 - 0.7 10e9/L    Absolute Basophils 0.0 0.0 - 0.2 10e9/L    Abs Immature Granulocytes 0.0 0 - 0.4 10e9/L    Absolute Nucleated RBC 0.0    Erythrocyte sedimentation rate auto   Result Value Ref Range    Sed Rate 17 (H) 0 - 15 mm/h   CRP inflammation   Result Value Ref Range    CRP Inflammation 57.9 (H) 0.0 - 8.0 mg/L   INR   Result Value Ref Range    INR 0.93 0.86 - 1.14   Basic metabolic panel   Result Value Ref Range    Sodium 140 133 - 144 mmol/L    Potassium 3.6 3.4 - 5.3 mmol/L    Chloride 108 94 - 109 mmol/L    Carbon Dioxide 27 20 - 32 mmol/L    Anion Gap 5 3 - 14 mmol/L    Glucose 134 (H) 70 - 99 mg/dL    Urea Nitrogen 19 7 - 30 mg/dL    Creatinine 1.02 0.66 - 1.25 mg/dL    GFR Estimate 82 >60 mL/min/1.7m2    GFR Estimate If Black >90 >60 mL/min/1.7m2    Calcium 8.0 (L) 8.5 - 10.1 mg/dL       Assessments & Plan (with Medical Decision Making)     I have reviewed the nursing notes.    Orthopedics is currently tied up on the [place] but reviewed the patient's ultrasound and laboratory results.  They suggested the patient may build to go home and can have follow-up this week in the clinic to be rechecked as needed.  This was explained to the patient and at this time he will be sent home.    I have reviewed the findings, diagnosis, plan and need for follow up with the patient.    New Prescriptions    No medications on file       Final diagnoses:   S/P bilateral unicompartmental knee replacement     Continue with discharge instructions as planned.    Follow-up with orthopedics as directed or you may call their clinic Monday morning for an appointment time to be seen this week as needed.    Return to the ER for worsening or fever.    Chan Wright MD    3/4/2018    [facility], [place], EMERGENCY DEPARTMENT     Chan Wright MD  03/04/18 [id number]

## 2018-03-07 DIAGNOSIS — Z96.653 S/P BILATERAL UNICOMPARTMENTAL KNEE REPLACEMENT: Primary | ICD-10-CM

## 2018-03-15 ENCOUNTER — OFFICE VISIT (OUTPATIENT)
Dept: ORTHOPEDICS | Facility: CLINIC | Age: 38
End: 2018-03-15
Payer: MEDICARE

## 2018-03-15 ENCOUNTER — RADIANT APPOINTMENT (OUTPATIENT)
Dept: GENERAL RADIOLOGY | Facility: CLINIC | Age: 38
End: 2018-03-15
Attending: NURSE PRACTITIONER
Payer: MEDICARE

## 2018-03-15 DIAGNOSIS — Z96.653 STATUS POST TOTAL BILATERAL KNEE REPLACEMENT: Primary | ICD-10-CM

## 2018-03-15 DIAGNOSIS — Z96.653 S/P BILATERAL UNICOMPARTMENTAL KNEE REPLACEMENT: ICD-10-CM

## 2018-03-15 RX ORDER — IBUPROFEN 800 MG/1
800 TABLET, FILM COATED ORAL EVERY 8 HOURS PRN
Qty: 90 TABLET | Refills: 0 | Status: SHIPPED | OUTPATIENT
Start: 2018-03-15 | End: 2018-07-18

## 2018-03-15 NOTE — LETTER
"3/15/2018       RE: Caesar Richardson  [address] [place] [id number]     Dear Colleague,    Thank you for referring your patient, Caesar Richardson, to the [facility] ORTHOPAEDIC CLINIC at [facility]. Please see a copy of my visit note below.    Caesar is seen 2 weeks post op of his bilateral medial uniarthroplasties.  DOS: 3/1/2018. He reports his \"right knee is awesome but his left knee is lacking motion\".  Taking no narcotics but does take tylenol.  Just finished a 10 day course of celebrex.  Denies fevers or chills. Taking aspirin daily. Doing home exercises. Walking without a limp and arises from chair unguarded without siderails. No calf pain. No nausea.   L knee reveals a moderate effusion. Incision is well healed. + SLR without pain. Strength symmetrical to contralateral side. ROM -8-96.   R knee reveals no effusion. Incision is well healed. + SLR without pain. Strength symmetrical to contralateral side.  ROM 0-118.    Xrays taken:  Good position of bilateral medial uniarthroplasties with ideal alignment. No lucency or acute changes from post op comparison imaging.    Dx:  1. Bilateral medial uniarthroplasties    Plan:  1. Tubigrip for left knee with a focus on improving edema. Prescribed motrin for swelling/inflammatory pain. Can continue tylenol as needed.  2. Continue rehab/exercises on motion and extension.  3. Follow up in 4-6 weeks for bilateral 2v knee xrays on arrival.     Sincerely,    Milagros Valencia, JASON CNP      "

## 2018-03-15 NOTE — MR AVS SNAPSHOT
After Visit Summary   3/15/2018    Caesar Richardson    MRN: [id number]           Patient Information     Date Of Birth          1980        Visit Information        Provider Department      3/15/2018 10:15 AM Milagros Valencia APRN CNP [facility]        Today's Diagnoses     Status post total bilateral knee replacement    -  1       Follow-ups after your visit        Your next 10 appointments already scheduled     Apr 09, 2018 11:15 AM CDT   (Arrive by 11:00 AM)   RETURN KNEE with Pradeep Turner MD   [facility] ([facility])    [address] [place] [address]  4th Floor  [facility] [id number] [phone]            Apr 11, 2018 10:30 AM CDT   [facility] with  [facility]   [facility] ([facility])    [address] [place] [address]  [address]  [facility] [address] [phone]            Apr 11, 2018 11:00 AM CDT   Return with  BMT DOM   [facility] and Marrow Transplant ([facility])    [address] [place] [address]  [address]  [facility] [address] [phone]              Who to contact     Please call your clinic at [phone] to:    Ask questions about your health    Make or cancel appointments    Discuss your medicines    Learn about your test results    Speak to your doctor            Additional Information About Your Visit        [employer] Information     [employer] gives you secure access to your electronic health record. If you see a primary care provider, you can also send messages to your care team and make appointments. If you have questions, please call your primary care clinic.  If you do not have a primary care provider, please call [phone] and they will assist you.      [employer] is an electronic gateway that provides easy, online access to your medical records. With [employer], you can request a clinic appointment, read your test results, renew a  prescription or communicate with your care team.     To access your existing account, please contact your [facility] or call [phone] for assistance.        Care EveryWhere ID     This is your Care EveryWhere ID. This could be used by other organizations to access your [facility] medical records  [id number]         Blood Pressure from Last 3 Encounters:   03/04/18 101/68   03/03/18 110/60   02/05/18 133/87    Weight from Last 3 Encounters:   03/01/18 90.9 kg (200 lb 6.4 oz)   02/05/18 93.9 kg (207 lb 1.6 oz)   10/25/17 92.4 kg (203 lb 9.6 oz)              Today, you had the following     No orders found for display         Today's Medication Changes          These changes are accurate as of 3/15/18 11:01 AM.  If you have any questions, ask your nurse or doctor.               Start taking these medicines.        Dose/Directions    ibuprofen 800 MG tablet   Commonly known as:  ADVIL/MOTRIN   Used for:  Status post total bilateral knee replacement   Started by:  Milagros Valencia APRN CNP        Dose:  800 mg   Take 1 tablet (800 mg) by mouth every 8 hours as needed for moderate pain   Quantity:  90 tablet   Refills:  0         These medicines have changed or have updated prescriptions.        Dose/Directions    traZODone 150 MG tablet   Commonly known as:  DESYREL   This may have changed:    - how much to take  - when to take this   Used for:  Anxiety, CML (chronic myelocytic leukemia) (H), Sfkos-mesduf-enft disease, unspecified, Panlobular emphysema (H), Heartburn, Cough, Erectile dysfunction, unspecified erectile dysfunction type, Status post allogeneic bone marrow transplant (H), Diminished libido        Dose:  150 mg   Take 1 tablet (150 mg) by mouth At Bedtime   Quantity:  90 tablet   Refills:  6            Where to get your medicines      Some of these will need a paper prescription and others can be bought over the counter.  Ask your nurse if you have questions.     Bring a  paper prescription for each of these medications     ibuprofen 800 MG tablet                Primary Care Provider Office Phone # Fax #    Sebas Leon [phone]       [address] [place] [address]        Equal Access to Services     ADALID CLEMENS : Hadnorma alison tyler luis Hui, waaxda luqadaha, qaybta kaalmada adegregorio, rosalee ortega tenishamalvin manjarrez rebel vicente. So [facility] [phone].    ATENCIÓN: Si habla español, tiene a st disposición servicios gratuitos de asistencia lingüística. Llame al [phone].    We comply with applicable federal civil rights laws and [place] laws. We do not discriminate on the basis of race, color, national origin, age, disability, sex, sexual orientation, or gender identity.            Thank you!     Thank you for choosing [facility] ORTH[facility]  for your care. Our goal is always to provide you with excellent care. Hearing back from our patients is one way we can continue to improve our services. Please take a few minutes to complete the written survey that you may receive in the mail after your visit with us. Thank you!             Your Updated Medication List - Protect others around you: Learn how to safely use, store and throw away your medicines at www.disposemymeds.org.          This list is accurate as of 3/15/18 11:01 AM.  Always use your most recent med list.                   Brand Name Dispense Instructions for use Diagnosis    acetaminophen 325 MG tablet    TYLENOL    100 tablet    Take 1-2 tablets every 4 hours as needed for pain.    Pain       aspirin  MG EC tablet     30 tablet    Take 1 tablet (325 mg) by mouth daily    Pain       diclofenac 1 % Gel topical gel    VOLTAREN    2 Tube    Apply topically 4 times daily    CML (chronic myelocytic leukemia) (H), Panlobular emphysema (H), Anxiety, Cough, Status post allogeneic bone marrow transplant (H), Bzwml-fgfaix-zdxv disease, unspecified, Diminished libido,  Erectile dysfunction, unspecified erectile dysfunction type, Heartburn       fluticasone-salmeterol 250-50 MCG/DOSE diskus inhaler    ADVAIR    1 Inhaler    Inhale 1 puff into the lungs 2 times daily    Panlobular emphysema (H), CML (chronic myelocytic leukemia) (H), Anxiety, Cough, Status post allogeneic bone marrow transplant (H), Jxoxe-mkhayr-wgaw disease, unspecified, Diminished libido, Erectile dysfunction, unspecified erectile dysfunction type, Heartburn       hydrOXYzine 25 MG tablet    ATARAX    60 tablet    Take 1 tablet (25 mg) by mouth every 6 hours as needed for itching    S/P bilateral unicompartmental knee replacement       ibuprofen 800 MG tablet    ADVIL/MOTRIN    90 tablet    Take 1 tablet (800 mg) by mouth every 8 hours as needed for moderate pain    Status post total bilateral knee replacement       order for DME     2 Units    cold packs for home use        senna-docusate 8.6-50 MG per tablet    SENOKOT-S;PERICOLACE    100 tablet    Take 1 tablet by mouth 2 times daily as needed for constipation    Pain       tiotropium 18 MCG capsule    SPIRIVA HANDIHALER    30 capsule    Inhale contents of one capsule daily.    Panlobular emphysema (H), CML (chronic myelocytic leukemia) (H), Anxiety, Cough, Status post allogeneic bone marrow transplant (H), Bnhqb-kzuqnp-vxxl disease, unspecified, Diminished libido, Erectile dysfunction, unspecified erectile dysfunction type, Heartburn       traZODone 150 MG tablet    DESYREL    90 tablet    Take 1 tablet (150 mg) by mouth At Bedtime    Anxiety, CML (chronic myelocytic leukemia) (H), Eblpk-cojeet-oezo disease, unspecified, Panlobular emphysema (H), Heartburn, Cough, Erectile dysfunction, unspecified erectile dysfunction type, Status post allogeneic bone marrow transplant (H), Diminished libido

## 2018-03-15 NOTE — PROGRESS NOTES
"Caesar is seen 2 weeks post op of his bilateral medial uniarthroplasties.  DOS: 3/1/2018. He reports his \"right knee is awesome but his left knee is lacking motion\".  Taking no narcotics but does take tylenol.  Just finished a 10 day course of celebrex.  Denies fevers or chills. Taking aspirin daily. Doing home exercises. Walking without a limp and arises from chair unguarded without siderails. No calf pain. No nausea.   L knee reveals a moderate effusion. Incision is well healed. + SLR without pain. Strength symmetrical to contralateral side. ROM -8-96.   R knee reveals no effusion. Incision is well healed. + SLR without pain. Strength symmetrical to contralateral side.  ROM 0-118.    Xrays taken:  Good position of bilateral medial uniarthroplasties with ideal alignment. No lucency or acute changes from post op comparison imaging.    Dx:  1. Bilateral medial uniarthroplasties    Plan:  1. Tubigrip for left knee with a focus on improving edema. Prescribed motrin for swelling/inflammatory pain. Can continue tylenol as needed.  2. Continue rehab/exercises on motion and extension.  3. Follow up in 4-6 weeks for bilateral 2v knee xrays on arrival.   "

## 2018-03-29 DIAGNOSIS — Z96.653 S/P BILATERAL UNICOMPARTMENTAL KNEE REPLACEMENT: Primary | ICD-10-CM

## 2018-04-09 ENCOUNTER — RADIANT APPOINTMENT (OUTPATIENT)
Dept: GENERAL RADIOLOGY | Facility: CLINIC | Age: 38
End: 2018-04-09
Attending: ORTHOPAEDIC SURGERY
Payer: MEDICARE

## 2018-04-09 ENCOUNTER — OFFICE VISIT (OUTPATIENT)
Dept: ORTHOPEDICS | Facility: CLINIC | Age: 38
End: 2018-04-09
Payer: MEDICARE

## 2018-04-09 DIAGNOSIS — Z96.652 STATUS POST LEFT PARTIAL KNEE REPLACEMENT: Primary | ICD-10-CM

## 2018-04-09 DIAGNOSIS — Z96.651 STATUS POST RIGHT PARTIAL KNEE REPLACEMENT: ICD-10-CM

## 2018-04-09 DIAGNOSIS — Z96.653 S/P BILATERAL UNICOMPARTMENTAL KNEE REPLACEMENT: ICD-10-CM

## 2018-04-09 ASSESSMENT — ENCOUNTER SYMPTOMS
ALTERED TEMPERATURE REGULATION: 0
NAUSEA: 0
POLYPHAGIA: 0
FATIGUE: 1
DIARRHEA: 1
ARTHRALGIAS: 1
NECK PAIN: 0
WEIGHT LOSS: 0
MUSCLE WEAKNESS: 0
DECREASED APPETITE: 0
INCREASED ENERGY: 0
BOWEL INCONTINENCE: 0
RECTAL PAIN: 0
WEIGHT GAIN: 0
MUSCLE CRAMPS: 1
BACK PAIN: 0
MYALGIAS: 1
BLOATING: 0
CONSTIPATION: 1
VOMITING: 0
POLYDIPSIA: 0
JOINT SWELLING: 0
HEARTBURN: 1
STIFFNESS: 0
JAUNDICE: 0
CHILLS: 0
ABDOMINAL PAIN: 0
BLOOD IN STOOL: 0
HALLUCINATIONS: 0
NIGHT SWEATS: 0

## 2018-04-09 NOTE — LETTER
"4/9/2018       RE: Caesar Richardson  [address] [place] [id number]     Dear Colleague,    Thank you for referring your patient, Caesar Richardson, to the [facility] ORTHOPAEDIC CLINIC at [facility]. Please see a copy of my visit note below.    Caesar is seen in follow up s/p bilateral medial knee uniarthroplasties. DOS: 3/1/31114. He reports his \"right knee is normal but left one still feels stiff\". Doing home exercises every day \"most of the time at least twice\". Afebrile. Taking nothing for pain. No effusion to right knee with only a slight to the left. Minimal erythema. Nontender to palapte joints. Incisions are well healed. Strength improving. No calf pain. ROM  R: 0-122  L: 0-116  Alignment is near neutral.    Xrays:  FINDINGS: AP and lateral views of the bilateral knees were obtained.  Postsurgical changes of medial femorotibial unicompartmental  arthroplasties in both knees. The hardware appears intact. Small  amount of fluid in both knee joints.         IMPRESSION: Postsurgical changes of medial femorotibial  unicompartmental arthroplasties in both knees, as above.    Dx:  1. Bilateral medial uniarthroplasties    Plan:  1. Continue gentle stretching/edema control.  2. Follow up in 3 months for xrays on arrival.     I, Dr. Pradeep Turner,  have seen and examined this patient with JASON Rees, CNP.      Again, thank you for allowing me to participate in the care of your patient.      Sincerely,    Pradeep Turner MD      "

## 2018-04-09 NOTE — NURSING NOTE
Reason For Visit:   Chief Complaint   Patient presents with     Surgical Followup     Bilateral knee unis.  DOS: 3/1/2018, left ankle swelling       There were no vitals taken for this visit.    Pain Assessment  Patient Currently in Pain: Yes  0-10 Pain Scale:  (3-4)  Primary Pain Location: Knee  Pain Orientation: Left  Pain Descriptors: Other (comment) (feels like his knee will explode)  Alleviating Factors: NSAIDS  Aggravating Factors: Bending, Walking    Mabel Barrientos ATC

## 2018-04-09 NOTE — PROGRESS NOTES
"Caesar is seen in follow up s/p bilateral medial knee uniarthroplasties. DOS: 3/1/87626. He reports his \"right knee is normal but left one still feels stiff\". Doing home exercises every day \"most of the time at least twice\". Afebrile. Taking nothing for pain. No effusion to right knee with only a slight to the left. Minimal erythema. Nontender to palapte joints. Incisions are well healed. Strength improving. No calf pain. ROM  R: 0-122  L: 0-116  Alignment is near neutral.    Xrays:  FINDINGS: AP and lateral views of the bilateral knees were obtained.  Postsurgical changes of medial femorotibial unicompartmental  arthroplasties in both knees. The hardware appears intact. Small  amount of fluid in both knee joints.         IMPRESSION: Postsurgical changes of medial femorotibial  unicompartmental arthroplasties in both knees, as above.    Dx:  1. Bilateral medial uniarthroplasties    Plan:  1. Continue gentle stretching/edema control.  2. Follow up in 3 months for xrays on arrival.     I, Dr. Pradeep Turner,  have seen and examined this patient with Milagros Valencia, APRN, CNP.    "

## 2018-04-09 NOTE — MR AVS SNAPSHOT
After Visit Summary   4/9/2018    Caesar Richardson    MRN: [id number]           Patient Information     Date Of Birth          1980        Visit Information        Provider Department      4/9/2018 11:15 AM Pradeep Turner MD [facility]        Today's Diagnoses     Status post left partial knee replacement    -  1    Status post right partial knee replacement           Follow-ups after your visit        Your next 10 appointments already scheduled     Apr 11, 2018 10:30 AM CDT   [facility] Lab Draw with  MASONIC LAB DRAW   [facility] Draw ([place])    [address] [place] [address]  [address]  [facility] [address]   [phone]            Apr 11, 2018 11:00 AM CDT   Return with  BMT DOM   [facility] Trans[facility] ([place])    [address] [place] [address]  [address]  [facility] [id number] [phone]            Jul 23, 2018 10:30 AM CDT   (Arrive by 10:15 AM)   RETURN KNEE with Pradeep Turner MD   [facility] ([place])    [address] [place] [address]  4th Floor  [facility] [id number] [phone]              Who to contact     Please call your clinic at [phone] to:    Ask questions about your health    Make or cancel appointments    Discuss your medicines    Learn about your test results    Speak to your doctor            Additional Information About Your Visit        Tymphanyhart Information     Greenbox gives you secure access to your electronic health record. If you see a primary care provider, you can also send messages to your care team and make appointments. If you have questions, please call your primary care clinic.  If you do not have a primary care provider, please call [phone] and they will assist you.      Greenbox is an electronic gateway that provides easy, online access to your medical records. With Greenbox, you can request a clinic appointment,  read your test results, renew a prescription or communicate with your care team.     To access your existing account, please contact your [facility] or call [phone] for assistance.        Care EveryWhere ID     This is your Care EveryWhere ID. This could be used by other organizations to access your [place] medical records  [id number]         Blood Pressure from Last 3 Encounters:   03/04/18 101/68   03/03/18 110/60   02/05/18 133/87    Weight from Last 3 Encounters:   03/01/18 90.9 kg (200 lb 6.4 oz)   02/05/18 93.9 kg (207 lb 1.6 oz)   10/25/17 92.4 kg (203 lb 9.6 oz)              Today, you had the following     No orders found for display         Today's Medication Changes          These changes are accurate as of 4/9/18  1:15 PM.  If you have any questions, ask your nurse or doctor.               These medicines have changed or have updated prescriptions.        Dose/Directions    traZODone 150 MG tablet   Commonly known as:  DESYREL   This may have changed:    - how much to take  - when to take this   Used for:  Anxiety, CML (chronic myelocytic leukemia) (H), Bizad-tgabcf-czqb disease, unspecified, Panlobular emphysema (H), Heartburn, Cough, Erectile dysfunction, unspecified erectile dysfunction type, Status post allogeneic bone marrow transplant (H), Diminished libido        Dose:  150 mg   Take 1 tablet (150 mg) by mouth At Bedtime   Quantity:  90 tablet   Refills:  6                Primary Care Provider Office Phone # Fax #    Sebas Leon -[phone]       [address] [place] [address]        Equal Access to Services     [facility] : Hadii alison smith Soneyda, waaxda luqadaha, qaybta kaalmada rosalee powell. So [facility] [phone].    ATENCIÓN: Si habla español, tiene a st disposición servicios gratuitos de asistencia lingüística. Llame al [phone].    We comply with applicable  federal civil rights laws and [place] laws. We do not discriminate on the basis of race, color, national origin, age, disability, sex, sexual orientation, or gender identity.            Thank you!     Thank you for choosing [facility]  for your care. Our goal is always to provide you with excellent care. Hearing back from our patients is one way we can continue to improve our services. Please take a few minutes to complete the written survey that you may receive in the mail after your visit with us. Thank you!             Your Updated Medication List - Protect others around you: Learn how to safely use, store and throw away your medicines at www.disposemymeds.org.          This list is accurate as of 4/9/18  1:15 PM.  Always use your most recent med list.                   Brand Name Dispense Instructions for use Diagnosis    acetaminophen 325 MG tablet    TYLENOL    100 tablet    Take 1-2 tablets every 4 hours as needed for pain.    Pain       diclofenac 1 % Gel topical gel    VOLTAREN    2 Tube    Apply topically 4 times daily    CML (chronic myelocytic leukemia) (H), Panlobular emphysema (H), Anxiety, Cough, Status post allogeneic bone marrow transplant (H), Pdndv-ekslkf-vklv disease, unspecified, Diminished libido, Erectile dysfunction, unspecified erectile dysfunction type, Heartburn       fluticasone-salmeterol 250-50 MCG/DOSE diskus inhaler    ADVAIR    1 Inhaler    Inhale 1 puff into the lungs 2 times daily    Panlobular emphysema (H), CML (chronic myelocytic leukemia) (H), Anxiety, Cough, Status post allogeneic bone marrow transplant (H), Ekutq-bqjvpk-tfwu disease, unspecified, Diminished libido, Erectile dysfunction, unspecified erectile dysfunction type, Heartburn       hydrOXYzine 25 MG tablet    ATARAX    60 tablet    Take 1 tablet (25 mg) by mouth every 6 hours as needed for itching    S/P bilateral unicompartmental knee replacement       ibuprofen 800 MG tablet    ADVIL/MOTRIN    90  tablet    Take 1 tablet (800 mg) by mouth every 8 hours as needed for moderate pain    Status post total bilateral knee replacement       order for DME     2 Units    cold packs for home use        senna-docusate 8.6-50 MG per tablet    SENOKOT-S;PERICOLACE    100 tablet    Take 1 tablet by mouth 2 times daily as needed for constipation    Pain       tiotropium 18 MCG capsule    SPIRIVA HANDIHALER    30 capsule    Inhale contents of one capsule daily.    Panlobular emphysema (H), CML (chronic myelocytic leukemia) (H), Anxiety, Cough, Status post allogeneic bone marrow transplant (H), Zdtzd-rvqsyg-hetj disease, unspecified, Diminished libido, Erectile dysfunction, unspecified erectile dysfunction type, Heartburn       traZODone 150 MG tablet    DESYREL    90 tablet    Take 1 tablet (150 mg) by mouth At Bedtime    Anxiety, CML (chronic myelocytic leukemia) (H), Kkxgl-opiann-vfmk disease, unspecified, Panlobular emphysema (H), Heartburn, Cough, Erectile dysfunction, unspecified erectile dysfunction type, Status post allogeneic bone marrow transplant (H), Diminished libido

## 2018-04-11 ENCOUNTER — APPOINTMENT (OUTPATIENT)
Dept: LAB | Facility: CLINIC | Age: 38
End: 2018-04-11
Attending: INTERNAL MEDICINE
Payer: MEDICARE

## 2018-04-11 ENCOUNTER — ONCOLOGY VISIT (OUTPATIENT)
Dept: TRANSPLANT | Facility: CLINIC | Age: 38
End: 2018-04-11
Attending: INTERNAL MEDICINE
Payer: MEDICARE

## 2018-04-11 VITALS
TEMPERATURE: 99 F | DIASTOLIC BLOOD PRESSURE: 77 MMHG | SYSTOLIC BLOOD PRESSURE: 126 MMHG | WEIGHT: 205.6 LBS | OXYGEN SATURATION: 100 % | BODY MASS INDEX: 31.26 KG/M2

## 2018-04-11 DIAGNOSIS — Z94.81 STATUS POST ALLOGENEIC BONE MARROW TRANSPLANT (H): ICD-10-CM

## 2018-04-11 DIAGNOSIS — C92.10 CHRONIC MYELOID LEUKEMIA (H): ICD-10-CM

## 2018-04-11 DIAGNOSIS — E78.00 HYPERCHOLESTEREMIA: ICD-10-CM

## 2018-04-11 LAB
ALBUMIN SERPL-MCNC: 3.9 G/DL (ref 3.4–5)
ALP SERPL-CCNC: 164 U/L (ref 40–150)
ALT SERPL W P-5'-P-CCNC: 81 U/L (ref 0–70)
ANION GAP SERPL CALCULATED.3IONS-SCNC: 7 MMOL/L (ref 3–14)
AST SERPL W P-5'-P-CCNC: 32 U/L (ref 0–45)
BASOPHILS # BLD AUTO: 0.1 10E9/L (ref 0–0.2)
BASOPHILS NFR BLD AUTO: 0.5 %
BILIRUB SERPL-MCNC: 0.3 MG/DL (ref 0.2–1.3)
BUN SERPL-MCNC: 14 MG/DL (ref 7–30)
CALCIUM SERPL-MCNC: 8.8 MG/DL (ref 8.5–10.1)
CHLORIDE SERPL-SCNC: 111 MMOL/L (ref 94–109)
CO2 SERPL-SCNC: 23 MMOL/L (ref 20–32)
CREAT SERPL-MCNC: 0.91 MG/DL (ref 0.66–1.25)
DIFFERENTIAL METHOD BLD: NORMAL
EOSINOPHIL # BLD AUTO: 0.1 10E9/L (ref 0–0.7)
EOSINOPHIL NFR BLD AUTO: 1 %
ERYTHROCYTE [DISTWIDTH] IN BLOOD BY AUTOMATED COUNT: 13.3 % (ref 10–15)
GFR SERPL CREATININE-BSD FRML MDRD: >90 ML/MIN/1.7M2
GLUCOSE SERPL-MCNC: 82 MG/DL (ref 70–99)
HCT VFR BLD AUTO: 42.5 % (ref 40–53)
HGB BLD-MCNC: 14.1 G/DL (ref 13.3–17.7)
IMM GRANULOCYTES # BLD: 0.1 10E9/L (ref 0–0.4)
IMM GRANULOCYTES NFR BLD: 0.5 %
LYMPHOCYTES # BLD AUTO: 2.9 10E9/L (ref 0.8–5.3)
LYMPHOCYTES NFR BLD AUTO: 28.5 %
MCH RBC QN AUTO: 30.5 PG (ref 26.5–33)
MCHC RBC AUTO-ENTMCNC: 33.2 G/DL (ref 31.5–36.5)
MCV RBC AUTO: 92 FL (ref 78–100)
MONOCYTES # BLD AUTO: 0.8 10E9/L (ref 0–1.3)
MONOCYTES NFR BLD AUTO: 7.5 %
NEUTROPHILS # BLD AUTO: 6.4 10E9/L (ref 1.6–8.3)
NEUTROPHILS NFR BLD AUTO: 62 %
NRBC # BLD AUTO: 0 10*3/UL
NRBC BLD AUTO-RTO: 0 /100
PLATELET # BLD AUTO: 227 10E9/L (ref 150–450)
POTASSIUM SERPL-SCNC: 4 MMOL/L (ref 3.4–5.3)
PROT SERPL-MCNC: 7.3 G/DL (ref 6.8–8.8)
RBC # BLD AUTO: 4.63 10E12/L (ref 4.4–5.9)
SODIUM SERPL-SCNC: 140 MMOL/L (ref 133–144)
WBC # BLD AUTO: 10.3 10E9/L (ref 4–11)

## 2018-04-11 PROCEDURE — G0463 HOSPITAL OUTPT CLINIC VISIT: HCPCS | Mod: ZF

## 2018-04-11 PROCEDURE — 36415 COLL VENOUS BLD VENIPUNCTURE: CPT

## 2018-04-11 PROCEDURE — 80053 COMPREHEN METABOLIC PANEL: CPT | Performed by: INTERNAL MEDICINE

## 2018-04-11 PROCEDURE — 85025 COMPLETE CBC W/AUTO DIFF WBC: CPT | Performed by: INTERNAL MEDICINE

## 2018-04-11 RX ORDER — NICOTINE 21 MG/24HR
1 PATCH, TRANSDERMAL 24 HOURS TRANSDERMAL EVERY 24 HOURS
COMMUNITY
End: 2021-07-07

## 2018-04-11 NOTE — MR AVS SNAPSHOT
After Visit Summary   4/11/2018    Caesar Richardson    MRN: [id number]           Patient Information     Date Of Birth          1980        Visit Information        Provider Department      4/11/2018 11:00 AM  BMT DOM [facility] Blood and Marrow Transplant        Today's Diagnoses     Chronic myeloid leukemia (H)        Status post allogeneic bone marrow transplant (H)        Hypercholesteremia              [facility] ([facility])  [address] [place] [address]  Phone: [phone]  Clinic Hours:   Monday-Thursday:7am to 7pm   Friday: 7am to 5pm   Weekends and holidays:    8am to noon (in general)  If your fever is 100.5  or greater,   call the clinic.  After hours call the   hospital at [phone] or   [phone]. Ask for the BMT   fellow on-call           Care Instructions     July IS SCHEDULED           Follow-ups after your visit        Your next 10 appointments already scheduled     Jul 20, 2018 10:00 AM CDT   [facility] Lab Draw with  MASONIC LAB DRAW   [facility] Lab Draw ([facility])    [address] [place] [address]   [phone]            Jul 20, 2018 10:30 AM CDT   Return with Sebas Leon MD   [facility] Blood and Marrow Transplant ([facility])    [address] [place] [address]   [phone]            Jul 23, 2018 10:30 AM CDT   (Arrive by 10:15 AM)   RETURN KNEE with Pradeep Turner MD   [facility] ([facility])    [address] [place] [address]  4th [facility] [address] [phone]              Future tests that were ordered for you today     Open Future Orders        Priority Expected Expires Ordered    Basic metabolic panel Routine 4/11/2018 4/11/2019 4/11/2018    CBC with platelets differential Routine 4/11/2018 4/11/2019 4/11/2018    BCR ABL1 Major Breakpoint Quant p210 Routine  4/11/2018 4/11/2019 4/11/2018            Who to contact     If you have questions or need follow up information about today's clinic visit or your schedule please contact [facility] directly at [phone].  Normal or non-critical lab and imaging results will be communicated to you by PillGuardhart, letter or phone within 4 business days after the clinic has received the results. If you do not hear from us within 7 days, please contact the clinic through [employer] or phone. If you have a critical or abnormal lab result, we will notify you by phone as soon as possible.  Submit refill requests through Retrace or call your pharmacy and they will forward the refill request to us. Please allow 3 business days for your refill to be completed.          Additional Information About Your Visit        Retrace Information     Retrace gives you secure access to your electronic health record. If you see a primary care provider, you can also send messages to your care team and make appointments. If you have questions, please call your primary care clinic.  If you do not have a primary care provider, please call [phone] and they will assist you.        Care EveryWhere ID     This is your Care EveryWhere ID. This could be used by other organizations to access your [place] medical records  [id number]        Your Vitals Were     Temperature Pulse Oximetry BMI (Body Mass Index)             99  F (37.2  C) (Oral) 100% 31.26 kg/m2          Blood Pressure from Last 3 Encounters:   04/11/18 126/77   03/04/18 101/68   03/03/18 110/60    Weight from Last 3 Encounters:   04/11/18 93.3 kg (205 lb 9.6 oz)   03/01/18 90.9 kg (200 lb 6.4 oz)   02/05/18 93.9 kg (207 lb 1.6 oz)              We Performed the Following     CBC with platelets differential     Comprehensive metabolic panel          Today's Medication Changes          These changes are accurate as of 4/11/18 11:15 AM.  If you have any questions, ask your  nurse or doctor.               These medicines have changed or have updated prescriptions.        Dose/Directions    traZODone 150 MG tablet   Commonly known as:  DESYREL   This may have changed:    - how much to take  - when to take this   Used for:  Anxiety, CML (chronic myelocytic leukemia) (H), Yquhx-pjjieu-dntg disease, unspecified, Panlobular emphysema (H), Heartburn, Cough, Erectile dysfunction, unspecified erectile dysfunction type, Status post allogeneic bone marrow transplant (H), Diminished libido        Dose:  150 mg   Take 1 tablet (150 mg) by mouth At Bedtime   Quantity:  90 tablet   Refills:  6                Recent Review Flowsheet Data     BMT Recent Results Latest Ref Rng & Units 7/26/2017 10/25/2017 3/1/2018 3/2/2018 3/3/2018 3/4/2018 4/11/2018    WBC 4.0 - 11.0 10e9/L 9.1 11.9(H) - 14.2(H) 8.9 8.2 10.3    Hemoglobin 13.3 - 17.7 g/dL 14.9 13.9 - 12.7(L) 12.6(L) 12.1(L) 14.1    Platelet Count 150 - 450 10e9/L 218 298 - 163 154 167 227    Neutrophils (Absolute) 1.6 - 8.3 10e9/L 5.1 8.0 - - 4.1 4.4 6.4    INR 0.86 - 1.14 - - - - - 0.93 -    Sodium 133 - 144 mmol/L 139 136 - 142 141 140 -    Potassium 3.4 - 5.3 mmol/L 4.1 4.1 - 5.0 4.2 3.6 -    Chloride 94 - 109 mmol/L 107 104 - 111(H) 110(H) 108 -    Glucose 70 - 99 mg/dL 83 73 139(H) 156(H) 71 134(H) -    Urea Nitrogen 7 - 30 mg/dL 16 16 - 15 18 19 -    Creatinine 0.66 - 1.25 mg/dL 1.24 1.04 - 1.00 0.97 1.02 -    Calcium (Total) 8.5 - 10.1 mg/dL 9.4 8.7 - 8.8 7.9(L) 8.0(L) -    Protein (Total) 6.8 - 8.8 g/dL 7.5 7.6 - - - - -    Albumin 3.4 - 5.0 g/dL 3.8 3.7 - - - - -    Alkaline Phosphatase 40 - 150 U/L 170(H) 158(H) - - - - -    AST 0 - 45 U/L 58(H) 35 - - - - -    ALT 0 - 70 U/L 114(H) 68 - - - - -    MCV 78 - 100 fl 91 92 - 93 94 92 92               Primary Care Provider Office Phone # Fax #    Sebas Leon [phone] [address] [facility] [address]  [facility] [address]        Equal Access to Services     ADALID  [facility]: Hadii aad ku luis Hui, waaxda luqadaha, qaybta kachetanda andre, rosalee izabellain hayaaedwardo stevenmalvin manjrarez rebel . So [facility] [phone].    ATENCIÓN: Si ramseyla monse, tiene a st disposición servicios gratuitos de asistencia lingüística. Silverioame al [phone].    We comply with applicable federal civil rights laws and [place] laws. We do not discriminate on the basis of race, color, national origin, age, disability, sex, sexual orientation, or gender identity.            Thank you!     Thank you for choosing [facility]OD [facility] MARROW TRANSPL[facility]  for your care. Our goal is always to provide you with excellent care. Hearing back from our patients is one way we can continue to improve our services. Please take a few minutes to complete the written survey that you may receive in the mail after your visit with us. Thank you!             Your Updated Medication List - Protect others around you: Learn how to safely use, store and throw away your medicines at www.disposemymeds.org.          This list is accurate as of 4/11/18 11:15 AM.  Always use your most recent med list.                   Brand Name Dispense Instructions for use Diagnosis    acetaminophen 325 MG tablet    TYLENOL    100 tablet    Take 1-2 tablets every 4 hours as needed for pain.    Pain       diclofenac 1 % Gel topical gel    VOLTAREN    2 Tube    Apply topically 4 times daily    CML (chronic myelocytic leukemia) (H), Panlobular emphysema (H), Anxiety, Cough, Status post allogeneic bone marrow transplant (H), Zdazk-vedkzq-cajn disease, unspecified, Diminished libido, Erectile dysfunction, unspecified erectile dysfunction type, Heartburn       fluticasone-salmeterol 250-50 MCG/DOSE diskus inhaler    ADVAIR    1 Inhaler    Inhale 1 puff into the lungs 2 times daily    Panlobular emphysema (H), CML (chronic myelocytic leukemia) (H), Anxiety, Cough, Status post allogeneic bone marrow transplant (H), Gpbkn-jbjjmi-qnzy disease, unspecified,  Diminished libido, Erectile dysfunction, unspecified erectile dysfunction type, Heartburn       hydrOXYzine 25 MG tablet    ATARAX    60 tablet    Take 1 tablet (25 mg) by mouth every 6 hours as needed for itching    S/P bilateral unicompartmental knee replacement       ibuprofen 800 MG tablet    ADVIL/MOTRIN    90 tablet    Take 1 tablet (800 mg) by mouth every 8 hours as needed for moderate pain    Status post total bilateral knee replacement       NICODERM CQ 21 MG/24HR 24 hr patch   Generic drug:  nicotine      Place 1 patch onto the skin every 24 hours        order for DME     2 Units    cold packs for home use        senna-docusate 8.6-50 MG per tablet    SENOKOT-S;PERICOLACE    100 tablet    Take 1 tablet by mouth 2 times daily as needed for constipation    Pain       tiotropium 18 MCG capsule    SPIRIVA HANDIHALER    30 capsule    Inhale contents of one capsule daily.    Panlobular emphysema (H), CML (chronic myelocytic leukemia) (H), Anxiety, Cough, Status post allogeneic bone marrow transplant (H), Kypks-fsamkp-yoea disease, unspecified, Diminished libido, Erectile dysfunction, unspecified erectile dysfunction type, Heartburn       traZODone 150 MG tablet    DESYREL    90 tablet    Take 1 tablet (150 mg) by mouth At Bedtime    Anxiety, CML (chronic myelocytic leukemia) (H), Xxzvf-rnnoih-cayd disease, unspecified, Panlobular emphysema (H), Heartburn, Cough, Erectile dysfunction, unspecified erectile dysfunction type, Status post allogeneic bone marrow transplant (H), Diminished libido

## 2018-04-11 NOTE — PROGRESS NOTES
BMT CLINIC PROGRESS NOTE   Caesar Richardson is a 37 year old male with CML status post allo HCT in distant past, and DLI in 6/13/2012.  CC:  Here for scheduled routine f/u.    HPI:  Here for routine f/u while seeing orthopedics.  Had bilateral knee repairs, and is now ambulating well.  Lt knee is still a little stiff, but he can walk without aid.  Still smoking and using nicotine patches.  No c/o.   ROS:  10 point ROS negative except as noted above   Physical exam:/77 (BP Location: Right arm, Patient Position: Right side, Cuff Size: Adult Regular)  Temp 99  F (37.2  C) (Oral)  Wt 93.3 kg (205 lb 9.6 oz)  SpO2 100%  BMI 31.26 kg/m2  Gen:  Unremarkable.  Looks well.   HEENT: OP moist.  New upper and lower plates.  No mucositis or lichenoid changes, but tongue is coated.  Pulm: CAB.   CVS: RRR, no m/r/g   ABD +BS, soft, nontender, obese  EXT: no edema, no rashes.   SKIN: No rash.      LABS:  Lab Results   Component Value Date    WBC 10.3 04/11/2018    ANEU 6.4 04/11/2018    HGB 14.1 04/11/2018    HCT 42.5 04/11/2018     04/11/2018     04/11/2018    POTASSIUM 4.0 04/11/2018    CHLORIDE 111 (H) 04/11/2018    CO2 23 04/11/2018    GLC 82 04/11/2018    BUN 14 04/11/2018    CR 0.91 04/11/2018    MAG 2.0 03/12/2014    INR 0.93 03/04/2018    BILITOTAL 0.3 04/11/2018    AST 32 04/11/2018    ALT 81 (H) 04/11/2018    ALKPHOS 164 (H) 04/11/2018    PROTTOTAL 7.3 04/11/2018    ALBUMIN 3.9 04/11/2018       ASSESSMENT AND PLAN:     1. Hematology/Chronic myelogenous leukemia:     - S/P DLI on 6/13/12 for CML relapse. Achieved remission.  Not on TKI.    2. ID: afebrile. No active infection.    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     4. Anxiety/depression:  His emotional symtoms are well controlled today. his psycho-social issues are much improved now that he has support from social security disability.    5. FEN:  lost 14 lbs. I  praised him for that and encouraged to lose another 10 lbs.     6. GI: No c/o. Mildly elevated LFTs.    7. Pulmonary:  Still smoking, using nicotine patches.      PLAN:  Return July to see Dr. Leon with BCR-ABL and other routine labs.  Monitor LFTs.     Brandt Marinelli MD.

## 2018-04-11 NOTE — NURSING NOTE
Chief Complaint   Patient presents with     Blood Draw     Labs and vitals were done by Medical Assistant     Lucie Van MA

## 2018-04-11 NOTE — NURSING NOTE
"Oncology Rooming Note    April 11, 2018 11:06 AM   Caesar Richardson is a 37 year old male who presents for:    Chief Complaint   Patient presents with     Blood Draw     Labs and vitals were done by Medical Assistant     Oncology Clinic Visit     Patient with Chronic Myeloid Leukemia here for provider visit and lab review      Initial Vitals: /77 (BP Location: Right arm, Patient Position: Right side, Cuff Size: Adult Regular)  Temp 99  F (37.2  C) (Oral)  Wt 93.3 kg (205 lb 9.6 oz)  SpO2 100%  BMI 31.26 kg/m2 Estimated body mass index is 31.26 kg/(m^2) as calculated from the following:    Height as of 3/1/18: 1.727 m (5' 8\").    Weight as of this encounter: 93.3 kg (205 lb 9.6 oz). Body surface area is 2.12 meters squared.  Data Unavailable Comment: Data Unavailable   No LMP for male patient.  Allergies reviewed: Yes  Medications reviewed: Yes    Medications: Medication refills not needed today.  Pharmacy name entered into EPIC:    [facility]  [facility] [place], [place] - [address] [place] [address] [phone]    Clinical concerns:     5 minutes for nursing intake (face to face time)     Mellissa Muhammad CMA              "

## 2018-07-11 DIAGNOSIS — Z96.653 S/P BILATERAL UNICOMPARTMENTAL KNEE REPLACEMENT: Primary | ICD-10-CM

## 2018-07-18 ENCOUNTER — ONCOLOGY VISIT (OUTPATIENT)
Dept: TRANSPLANT | Facility: CLINIC | Age: 38
End: 2018-07-18
Attending: INTERNAL MEDICINE
Payer: MEDICARE

## 2018-07-18 ENCOUNTER — APPOINTMENT (OUTPATIENT)
Dept: LAB | Facility: CLINIC | Age: 38
End: 2018-07-18
Attending: INTERNAL MEDICINE
Payer: MEDICARE

## 2018-07-18 VITALS
HEART RATE: 72 BPM | HEIGHT: 68 IN | DIASTOLIC BLOOD PRESSURE: 65 MMHG | OXYGEN SATURATION: 96 % | BODY MASS INDEX: 30.17 KG/M2 | TEMPERATURE: 98.6 F | SYSTOLIC BLOOD PRESSURE: 115 MMHG | WEIGHT: 199.1 LBS

## 2018-07-18 DIAGNOSIS — N52.9 ERECTILE DYSFUNCTION, UNSPECIFIED ERECTILE DYSFUNCTION TYPE: Primary | ICD-10-CM

## 2018-07-18 DIAGNOSIS — C92.10 CHRONIC MYELOID LEUKEMIA (H): ICD-10-CM

## 2018-07-18 LAB
ANION GAP SERPL CALCULATED.3IONS-SCNC: 8 MMOL/L (ref 3–14)
BASOPHILS # BLD AUTO: 0.1 10E9/L (ref 0–0.2)
BASOPHILS NFR BLD AUTO: 0.8 %
BUN SERPL-MCNC: 12 MG/DL (ref 7–30)
CALCIUM SERPL-MCNC: 8.8 MG/DL (ref 8.5–10.1)
CHLORIDE SERPL-SCNC: 109 MMOL/L (ref 94–109)
CO2 SERPL-SCNC: 25 MMOL/L (ref 20–32)
CREAT SERPL-MCNC: 1.01 MG/DL (ref 0.66–1.25)
DIFFERENTIAL METHOD BLD: NORMAL
EOSINOPHIL # BLD AUTO: 0.1 10E9/L (ref 0–0.7)
EOSINOPHIL NFR BLD AUTO: 1.2 %
ERYTHROCYTE [DISTWIDTH] IN BLOOD BY AUTOMATED COUNT: 13.2 % (ref 10–15)
GFR SERPL CREATININE-BSD FRML MDRD: 83 ML/MIN/1.7M2
GLUCOSE SERPL-MCNC: 92 MG/DL (ref 70–99)
HCT VFR BLD AUTO: 43.2 % (ref 40–53)
HGB BLD-MCNC: 14.3 G/DL (ref 13.3–17.7)
IMM GRANULOCYTES # BLD: 0 10E9/L (ref 0–0.4)
IMM GRANULOCYTES NFR BLD: 0.2 %
LYMPHOCYTES # BLD AUTO: 2.6 10E9/L (ref 0.8–5.3)
LYMPHOCYTES NFR BLD AUTO: 40.3 %
MCH RBC QN AUTO: 29.7 PG (ref 26.5–33)
MCHC RBC AUTO-ENTMCNC: 33.1 G/DL (ref 31.5–36.5)
MCV RBC AUTO: 90 FL (ref 78–100)
MONOCYTES # BLD AUTO: 0.7 10E9/L (ref 0–1.3)
MONOCYTES NFR BLD AUTO: 10 %
NEUTROPHILS # BLD AUTO: 3.1 10E9/L (ref 1.6–8.3)
NEUTROPHILS NFR BLD AUTO: 47.5 %
NRBC # BLD AUTO: 0 10*3/UL
NRBC BLD AUTO-RTO: 0 /100
PLATELET # BLD AUTO: 191 10E9/L (ref 150–450)
POTASSIUM SERPL-SCNC: 3.9 MMOL/L (ref 3.4–5.3)
RBC # BLD AUTO: 4.82 10E12/L (ref 4.4–5.9)
SODIUM SERPL-SCNC: 141 MMOL/L (ref 133–144)
WBC # BLD AUTO: 6.5 10E9/L (ref 4–11)

## 2018-07-18 PROCEDURE — 85025 COMPLETE CBC W/AUTO DIFF WBC: CPT | Performed by: INTERNAL MEDICINE

## 2018-07-18 PROCEDURE — 81206 BCR/ABL1 GENE MAJOR BP: CPT | Performed by: INTERNAL MEDICINE

## 2018-07-18 PROCEDURE — 80048 BASIC METABOLIC PNL TOTAL CA: CPT | Performed by: INTERNAL MEDICINE

## 2018-07-18 PROCEDURE — 36415 COLL VENOUS BLD VENIPUNCTURE: CPT

## 2018-07-18 RX ORDER — TADALAFIL 20 MG/1
20 TABLET ORAL DAILY PRN
Qty: 12 TABLET | Refills: 11 | Status: SHIPPED | OUTPATIENT
Start: 2018-07-18 | End: 2018-07-18

## 2018-07-18 RX ORDER — TADALAFIL 20 MG/1
20 TABLET ORAL DAILY PRN
Qty: 12 TABLET | Refills: 11 | Status: SHIPPED | OUTPATIENT
Start: 2018-07-18 | End: 2018-07-19

## 2018-07-18 ASSESSMENT — PAIN SCALES - GENERAL: PAINLEVEL: MILD PAIN (2)

## 2018-07-18 NOTE — NURSING NOTE
Chief Complaint   Patient presents with     Lab Only     labs drawn via venipuncture, vitals completed

## 2018-07-18 NOTE — MR AVS SNAPSHOT
After Visit Summary   7/18/2018    Caesar Richardson    MRN: [id number]           Patient Information     Date Of Birth          1980        Visit Information        Provider Department      7/18/2018 1:00 PM Sebas Leon MD [facility] Blood and Marrow Transplant        Today's Diagnoses     Erectile dysfunction, unspecified erectile dysfunction type    -  1    Chronic myeloid leukemia (H)              [facility] ([facility])  [address] [place] [address]  Phone: [phone]  Clinic Hours:   Monday-Thursday:7am to 7pm   Friday: 7am to 5pm   Weekends and holidays:    8am to noon (in general)  If your fever is 100.5  or greater,   call the clinic.  After hours call the   hospital at [phone] or   [phone]. Ask for the BMT   fellow on-call           Care Instructions    RTC Edward in 1 year            Follow-ups after your visit        Your next 10 appointments already scheduled     Jul 23, 2018 10:10 AM CDT   XR KNEE BILATERAL 1/2 VIEWS with UCORTHXR1   [facility] XRay ([facility])    [address] [place] [id number] [phone]           Please bring a list of your current medicines to your exam. (Include vitamins, minerals and over-thecounter medicines.) Leave your valuables at home.  Tell your doctor if there is a chance you may be pregnant.  You do not need to do anything special for this exam.            Jul 23, 2018 10:30 AM CDT   (Arrive by 10:15 AM)   RETURN KNEE with Pradeep Turner MD   [facility] ([facility])    [address] [place] [id number] [phone]            Jul 17, 2019 12:00 PM CDT   [facility] with  [facility]   [facility] ([facility])    [address] [place] [address]  [address]  [facility] [id number] [phone]            Jul 17, 2019 12:30 PM  "CDT   Return with Sebas Leon MD   [facility] Marrow Transplant ([facility])    [address]  [address]  [facility] [address] [phone]              Who to contact     If you have questions or need follow up information about today's clinic visit or your schedule please contact [facility]LOOD [facility]ARROW TRANSPL[facility] directly at [phone].  Normal or non-critical lab and imaging results will be communicated to you by [employer], letter or phone within 4 business days after the clinic has received the results. If you do not hear from us within 7 days, please contact the clinic through [employer] or phone. If you have a critical or abnormal lab result, we will notify you by phone as soon as possible.  Submit refill requests through [employer] or call your pharmacy and they will forward the refill request to us. Please allow 3 business days for your refill to be completed.          Additional Information About Your Visit        [employer] Information     [employer] gives you secure access to your electronic health record. If you see a primary care provider, you can also send messages to your care team and make appointments. If you have questions, please call your primary care clinic.  If you do not have a primary care provider, please call [phone] and they will assist you.        Care EveryWhere ID     This is your Care EveryWhere ID. This could be used by other organizations to access your [facility] medical records  [id number]        Your Vitals Were     Pulse Temperature Height Pulse Oximetry BMI (Body Mass Index)       72 98.6  F (37  C) (Oral) 1.727 m (5' 8\") 96% 30.27 kg/m2        Blood Pressure from Last 3 Encounters:   07/18/18 115/65   04/11/18 126/77   03/04/18 101/68    Weight from Last 3 Encounters:   07/18/18 90.3 kg (199 lb 1.6 oz)   04/11/18 93.3 kg (205 lb 9.6 oz)   03/01/18 90.9 kg (200 lb 6.4 oz)              We Performed the Following     Basic " metabolic panel     BCR ABL1 Major Breakpoint Quant p210     CBC with platelets differential          Today's Medication Changes          These changes are accurate as of 7/18/18 11:59 PM.  If you have any questions, ask your nurse or doctor.               Start taking these medicines.        Dose/Directions    sildenafil 100 MG tablet   Commonly known as:  VIAGRA   Used for:  Erectile dysfunction, unspecified erectile dysfunction type   Started by:  Sebas Leon MD        Dose:   mg   Take 0.5-1 tablets ( mg) by mouth daily as needed 30 min to 4 hrs before sex. Do not use with nitroglycerin, terazosin or doxazosin.   Quantity:  12 tablet   Refills:  11         These medicines have changed or have updated prescriptions.        Dose/Directions    traZODone 150 MG tablet   Commonly known as:  DESYREL   This may have changed:    - how much to take  - when to take this   Used for:  Anxiety, CML (chronic myelocytic leukemia) (H), Mgjqm-heqyxm-miqr disease, unspecified, Panlobular emphysema (H), Heartburn, Cough, Erectile dysfunction, unspecified erectile dysfunction type, Status post allogeneic bone marrow transplant (H), Diminished libido        Dose:  150 mg   Take 1 tablet (150 mg) by mouth At Bedtime   Quantity:  90 tablet   Refills:  6         Stop taking these medicines if you haven't already. Please contact your care team if you have questions.     hydrOXYzine 25 MG tablet   Commonly known as:  ATARAX   Stopped by:  Sebas Leon MD           ibuprofen 800 MG tablet   Commonly known as:  ADVIL/MOTRIN   Stopped by:  Sebas Leon MD                Where to get your medicines      These medications were sent to [address]  [address] [place] [address] [address] [address]    Hours:  TRANSPLANT PHONE NUMBER [phone] Phone:  [phone]     sildenafil 100 MG tablet                Recent  Review Flowsheet Data     BMT Recent Results Latest Ref Rng & Units 10/25/2017 3/1/2018 3/2/2018 3/3/2018 3/4/2018 4/11/2018 7/18/2018    WBC 4.0 - 11.0 10e9/L 11.9(H) - 14.2(H) 8.9 8.2 10.3 6.5    Hemoglobin 13.3 - 17.7 g/dL 13.9 - 12.7(L) 12.6(L) 12.1(L) 14.1 14.3    Platelet Count 150 - 450 10e9/L 298 - 163 154 167 227 191    Neutrophils (Absolute) 1.6 - 8.3 10e9/L 8.0 - - 4.1 4.4 6.4 3.1    INR 0.86 - 1.14 - - - - 0.93 - -    Sodium 133 - 144 mmol/L 136 - 142 141 140 140 141    Potassium 3.4 - 5.3 mmol/L 4.1 - 5.0 4.2 3.6 4.0 3.9    Chloride 94 - 109 mmol/L 104 - 111(H) 110(H) 108 111(H) 109    Glucose 70 - 99 mg/dL 73 139(H) 156(H) 71 134(H) 82 92    Urea Nitrogen 7 - 30 mg/dL 16 - 15 18 19 14 12    Creatinine 0.66 - 1.25 mg/dL 1.04 - 1.00 0.97 1.02 0.91 1.01    Calcium (Total) 8.5 - 10.1 mg/dL 8.7 - 8.8 7.9(L) 8.0(L) 8.8 8.8    Protein (Total) 6.8 - 8.8 g/dL 7.6 - - - - 7.3 -    Albumin 3.4 - 5.0 g/dL 3.7 - - - - 3.9 -    Alkaline Phosphatase 40 - 150 U/L 158(H) - - - - 164(H) -    AST 0 - 45 U/L 35 - - - - 32 -    ALT 0 - 70 U/L 68 - - - - 81(H) -    MCV 78 - 100 fl 92 - 93 94 92 92 90               Primary Care Provider Office Phone # Fax #    Sebas Leon [phone]       [address] [place] [address]  [facility] [id number]        Equal Access to Services     ADALID CLEMENS : Hadnoram Hui, natalieda cyril, kaity fragamada andre, rosalee vicente. So [facility] [phone].    ATENCIÓN: Si habla español, tiene a st disposición servicios gratuitos de asistencia lingüística. Llame al [phone].    We comply with applicable federal civil rights laws and [place] laws. We do not discriminate on the basis of race, color, national origin, age, disability, sex, sexual orientation, or gender identity.            Thank you!     Thank you for choosing [facility]OD [facility] MARROW TRANSPL[facility]  for your care. Our goal is always to provide you with excellent  care. Hearing back from our patients is one way we can continue to improve our services. Please take a few minutes to complete the written survey that you may receive in the mail after your visit with us. Thank you!             Your Updated Medication List - Protect others around you: Learn how to safely use, store and throw away your medicines at www.disposemymeds.org.          This list is accurate as of 7/18/18 11:59 PM.  Always use your most recent med list.                   Brand Name Dispense Instructions for use Diagnosis    acetaminophen 325 MG tablet    TYLENOL    100 tablet    Take 1-2 tablets every 4 hours as needed for pain.    Pain       diclofenac 1 % Gel topical gel    VOLTAREN    2 Tube    Apply topically 4 times daily    CML (chronic myelocytic leukemia) (H), Panlobular emphysema (H), Anxiety, Cough, Status post allogeneic bone marrow transplant (H), Qoytw-iflnrf-zlxd disease, unspecified, Diminished libido, Erectile dysfunction, unspecified erectile dysfunction type, Heartburn       fluticasone-salmeterol 250-50 MCG/DOSE diskus inhaler    ADVAIR    1 Inhaler    Inhale 1 puff into the lungs 2 times daily    Panlobular emphysema (H), CML (chronic myelocytic leukemia) (H), Anxiety, Cough, Status post allogeneic bone marrow transplant (H), Kploi-crfkau-ufsx disease, unspecified, Diminished libido, Erectile dysfunction, unspecified erectile dysfunction type, Heartburn       NICODERM CQ 21 MG/24HR 24 hr patch   Generic drug:  nicotine      Place 1 patch onto the skin every 24 hours        order for DME     2 Units    cold packs for home use        senna-docusate 8.6-50 MG per tablet    SENOKOT-S;PERICOLACE    100 tablet    Take 1 tablet by mouth 2 times daily as needed for constipation    Pain       sildenafil 100 MG tablet    VIAGRA    12 tablet    Take 0.5-1 tablets ( mg) by mouth daily as needed 30 min to 4 hrs before sex. Do not use with nitroglycerin, terazosin or doxazosin.    Erectile  dysfunction, unspecified erectile dysfunction type       tiotropium 18 MCG capsule    SPIRIVA HANDIHALER    30 capsule    Inhale contents of one capsule daily.    Panlobular emphysema (H), CML (chronic myelocytic leukemia) (H), Anxiety, Cough, Status post allogeneic bone marrow transplant (H), Eppkx-bjmmll-lehe disease, unspecified, Diminished libido, Erectile dysfunction, unspecified erectile dysfunction type, Heartburn       traZODone 150 MG tablet    DESYREL    90 tablet    Take 1 tablet (150 mg) by mouth At Bedtime    Anxiety, CML (chronic myelocytic leukemia) (H), Iiscf-yzybla-flfa disease, unspecified, Panlobular emphysema (H), Heartburn, Cough, Erectile dysfunction, unspecified erectile dysfunction type, Status post allogeneic bone marrow transplant (H), Diminished libido

## 2018-07-18 NOTE — PROGRESS NOTES
"BMT CLINIC PROGRESS NOTE   Caesar Richardson is a 34 year old male with CML status post allo HCT in distant past, and DLI in 6/13/2012.  CC:  Here for scheduled routine f/u.    HPI:  Mr. Richardson comes today feeling reasonably well.  Is much more functional and engaging than he used to be.  Is in a good place emotionally and physically he continues to struggle to stop smoking but he states that he continues to try.  He change his diet substantially.  He complains of erectile dysfunction.  He had exacerbations of COPD and he is using his inhalers. He has not   There are no other complaints and his complete review of systems except for what is mentioned above.   ROS:  10 point ROS negative except as noted above   Physical exam:/65  Pulse 72  Temp 98.6  F (37  C) (Oral)  Ht 1.727 m (5' 8\")  Wt 90.3 kg (199 lb 1.6 oz)  SpO2 96%  BMI 30.27 kg/m2  Gen:  A few areas of skin erythema, but no skin plaques of lichenoid changes. Has acneiform rash on his forehead. Spiders angiomas on his upper chest and back.   HEENT: OP moist, no upper teeth.  No mucositis or lichenoid changes.  Pulm: lungs with NO coarse sounds today.   CVS: RRR, no m/r/g   ABD +BS, soft, nontender, obese  EXT: no edema, no rashes, no edema.   SKIN: No rash.  Decorative tattoo on back.new forehead firm lesions.   MSK:  Crepitation on R forearm when moving his thumb.   Neuro/Psych:  Depressed affect, conversant, oriented, somewhat anxious     LABS:  Lab Results   Component Value Date    WBC 6.5 07/18/2018    ANEU 3.1 07/18/2018    HGB 14.3 07/18/2018    HCT 43.2 07/18/2018     07/18/2018     07/18/2018    POTASSIUM 3.9 07/18/2018    CHLORIDE 109 07/18/2018    CO2 25 07/18/2018    GLC 92 07/18/2018    BUN 12 07/18/2018    CR 1.01 07/18/2018    MAG 2.0 03/12/2014    INR 0.93 03/04/2018    BILITOTAL 0.3 04/11/2018    AST 32 04/11/2018    ALT 81 (H) 04/11/2018    ALKPHOS 164 (H) 04/11/2018    PROTTOTAL 7.3 04/11/2018    ALBUMIN 3.9 " 04/11/2018     ASSESSMENT AND PLAN:   1. Hematology/Chronic myelogenous leukemia:  Blood counts stable;   - S/P DLI on 6/13/12 for CML relapse. Achieved remission.    2. ID: afebrile. No active infection.    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 Spider angiomas could be liver disease but not enough enzyme change to support. Arthritis maybe GVHD but may do better with local steroid injection to avoid systemic effects, in particular emotional lability.     4. Anxiety/depression:  His emotional symtoms are well controlled today. his psycho-social issues are much improved now that he has support from social security disability.    5. FEN: Praised for good progress in his weight control.  Now he is below 200 pounds.      6. GI: No c/o. Mildly elevated LFTs    7. Pulmonary: With the loss of weight his pulmonary function seems to have improved as well.  He should continue to use the inhalers.  He was once again encouraged to stop smoking.      8. Skin: no rashes today. No fibrotic changes on huis skin. Spider angiomas on his chest and back (?)    9. Lipids: improved lipid profile with weight loss. Encouraged to lose more weight.     RTC Edward in 1 year; sooner if needed.  Continue to work on diet to loose weight and exercise.   Stop smoking

## 2018-07-19 DIAGNOSIS — N52.9 ERECTILE DYSFUNCTION, UNSPECIFIED ERECTILE DYSFUNCTION TYPE: ICD-10-CM

## 2018-07-19 RX ORDER — SILDENAFIL 100 MG/1
50-100 TABLET, FILM COATED ORAL DAILY PRN
Qty: 12 TABLET | Refills: 11 | Status: SHIPPED | OUTPATIENT
Start: 2018-07-19 | End: 2018-07-19

## 2018-07-19 RX ORDER — SILDENAFIL 25 MG/1
25-50 TABLET, FILM COATED ORAL DAILY PRN
Qty: 12 TABLET | Refills: 11 | Status: SHIPPED | OUTPATIENT
Start: 2018-07-19 | End: 2019-11-22

## 2018-07-20 LAB — COPATH REPORT: NORMAL

## 2018-07-23 ENCOUNTER — RADIANT APPOINTMENT (OUTPATIENT)
Dept: GENERAL RADIOLOGY | Facility: CLINIC | Age: 38
End: 2018-07-23
Attending: ORTHOPAEDIC SURGERY
Payer: MEDICARE

## 2018-07-23 ENCOUNTER — OFFICE VISIT (OUTPATIENT)
Dept: ORTHOPEDICS | Facility: CLINIC | Age: 38
End: 2018-07-23
Payer: MEDICARE

## 2018-07-23 DIAGNOSIS — Z47.1 AFTERCARE FOLLOWING BILATERAL KNEE JOINT REPLACEMENT SURGERY: Primary | ICD-10-CM

## 2018-07-23 DIAGNOSIS — Z96.653 AFTERCARE FOLLOWING BILATERAL KNEE JOINT REPLACEMENT SURGERY: Primary | ICD-10-CM

## 2018-07-23 DIAGNOSIS — Z96.653 S/P BILATERAL UNICOMPARTMENTAL KNEE REPLACEMENT: ICD-10-CM

## 2018-07-23 DIAGNOSIS — Z96.659 HISTORY OF PARTIAL KNEE REPLACEMENT: ICD-10-CM

## 2018-07-23 ASSESSMENT — ENCOUNTER SYMPTOMS
MUSCLE CRAMPS: 1
STIFFNESS: 1
JOINT SWELLING: 0
ARTHRALGIAS: 1
MUSCLE WEAKNESS: 1
NECK PAIN: 0
MYALGIAS: 1
BACK PAIN: 0

## 2018-07-23 NOTE — LETTER
"7/23/2018       RE: Caesar Richardson  [address] [place] [id number]     Dear Colleague,    Thank you for referring your patient, Caesar Richardson, to the [facility] at [facility]. Please see a copy of my visit note below.    Caesar is seen in follow up s/p bilateral medial uniarthroplasties.  DOS: 3/1/2018    HPI:  Caesar is seen 4 1/2 months post op of his bilateral medial uniarthroplasties. He is walking without a limp and states \"he forgets he even had surgery\". Denies any pain to medial side with \"complete pain relief\". Has some occasional \"catching\" on the outside of his left knee but very intermittent. Denies swelling. Denies night pain. Taking nothing for pain. Rates pain \"0\".     PMH: Reviewed in chart today 7/23/2018 and updated    ROS: Reviewed on patient tablet today 7/23/2018 with all systems negative except for those listed below    Meds/All: Updated today 7/23/2018    PE:  Pleasant and cooperative male alert and oriented x3. Is 5'8\" 199 pounds in no acute distress. Walks without a limp or assistive devices.    Right knee has a well healed incision. No joint effusion. Alignment is near neutral. No focal areas of tenderness. Ligament exam stable. ROM: 0-126. Quad strength is symmetrica at 5/5 to contralateral side. No calf pain.     Left knee has a well healed incision. No joint effusion. Alignment is near neutral. No focal areas of tenderness. Ligament exam stable. ROM: 0-130. Quad strength is symmetrica at 5/5 to contralateral side. No calf pain.     Xrays taken demonstrate ideal position of medial uniarthroplasties without acute lucency or change in alignment compared to serial xrays.    Dx:  1. Bilateral medial uniarthroplasties    Plan:  1. Antibiotics discussed lifelong for dental work  2. Follow up yearly for xrays.      Total time was 25 minutes with greater than 50% spent in direct face to face consultation and collaboration of " care.     I, Dr. Pradeep Turner, have seen and examined this patient with JASON Rees, CNP.      Again, thank you for allowing me to participate in the care of your patient.      Sincerely,    Pradeep Turner MD

## 2018-07-23 NOTE — PROGRESS NOTES
"Caesar is seen in follow up s/p bilateral medial uniarthroplasties.  DOS: 3/1/2018    HPI:  Caesar is seen 4 1/2 months post op of his bilateral medial uniarthroplasties. He is walking without a limp and states \"he forgets he even had surgery\". Denies any pain to medial side with \"complete pain relief\". Has some occasional \"catching\" on the outside of his left knee but very intermittent. Denies swelling. Denies night pain. Taking nothing for pain. Rates pain \"0\".     PMH: Reviewed in chart today 7/23/2018 and updated    ROS: Reviewed on patient tablet today 7/23/2018 with all systems negative except for those listed below    Meds/All: Updated today 7/23/2018    PE:  Pleasant and cooperative male alert and oriented x3. Is 5'8\" 199 pounds in no acute distress. Walks without a limp or assistive devices.    Right knee has a well healed incision. No joint effusion. Alignment is near neutral. No focal areas of tenderness. Ligament exam stable. ROM: 0-126. Quad strength is symmetrica at 5/5 to contralateral side. No calf pain.     Left knee has a well healed incision. No joint effusion. Alignment is near neutral. No focal areas of tenderness. Ligament exam stable. ROM: 0-130. Quad strength is symmetrica at 5/5 to contralateral side. No calf pain.     Xrays taken demonstrate ideal position of medial uniarthroplasties without acute lucency or change in alignment compared to serial xrays.    Dx:  1. Bilateral medial uniarthroplasties    Plan:  1. Antibiotics discussed lifelong for dental work  2. Follow up yearly for xrays.      Total time was 25 minutes with greater than 50% spent in direct face to face consultation and collaboration of care.    Answers for HPI/ROS submitted by the patient on 7/23/2018   General Symptoms: No  Skin Symptoms: No  HENT Symptoms: No  EYE SYMPTOMS: No  HEART SYMPTOMS: No  LUNG SYMPTOMS: No  INTESTINAL SYMPTOMS: No  URINARY SYMPTOMS: No  REPRODUCTIVE SYMPTOMS: No  SKELETAL SYMPTOMS: Yes  BLOOD " SYMPTOMS: No  NERVOUS SYSTEM SYMPTOMS: No  MENTAL HEALTH SYMPTOMS: No  Back pain: No  Muscle aches: Yes  Neck pain: No  Swollen joints: No  Joint pain: Yes  Bone pain: Yes  Muscle cramps: Yes  Muscle weakness: Yes  Joint stiffness: Yes  Bone fracture: No    I, Dr. Pradeep Turner, have seen and examined this patient with JASON Rees, CNP.

## 2018-07-23 NOTE — MR AVS SNAPSHOT
After Visit Summary   7/23/2018    Caesar Richardson    MRN: [id number]           Patient Information     Date Of Birth          1980        Visit Information        Provider Department      7/23/2018 10:30 AM Pradeep Turner MD [facility]        Today's Diagnoses     Aftercare following bilateral knee joint replacement surgery    -  1    History of partial knee replacement           Follow-ups after your visit        Your next 10 appointments already scheduled     Jul 17, 2019 12:00 PM CDT   [facility] Draw with  [facility]RAW   [facility] Draw ([facility])    [address] [place] [address]  [address]  [facility] [address] [phone]            Jul 17, 2019 12:30 PM CDT   Return with Sebas Leon MD   [facility] Blood and Marrow Transplant ([facility])    [address] [place] [address]  [address] [place] [address] [phone]              Who to contact     Please call your clinic at [phone] to:    Ask questions about your health    Make or cancel appointments    Discuss your medicines    Learn about your test results    Speak to your doctor            Additional Information About Your Visit        [employer] Information     [employer] gives you secure access to your electronic health record. If you see a primary care provider, you can also send messages to your care team and make appointments. If you have questions, please call your primary care clinic.  If you do not have a primary care provider, please call [phone] and they will assist you.      [employer] is an electronic gateway that provides easy, online access to your medical records. With [employer], you can request a clinic appointment, read your test results, renew a prescription or communicate with your care team.     To access your existing account, please contact your [facility] or call [phone] for  assistance.        Care EveryWhere ID     This is your Care EveryWhere ID. This could be used by other organizations to access your [place] medical records  [id number]         Blood Pressure from Last 3 Encounters:   07/18/18 115/65   04/11/18 126/77   03/04/18 101/68    Weight from Last 3 Encounters:   07/18/18 90.3 kg (199 lb 1.6 oz)   04/11/18 93.3 kg (205 lb 9.6 oz)   03/01/18 90.9 kg (200 lb 6.4 oz)              Today, you had the following     No orders found for display         Today's Medication Changes          These changes are accurate as of 7/23/18  4:33 PM.  If you have any questions, ask your nurse or doctor.               These medicines have changed or have updated prescriptions.        Dose/Directions    traZODone 150 MG tablet   Commonly known as:  DESYREL   This may have changed:    - how much to take  - when to take this   Used for:  Anxiety, CML (chronic myelocytic leukemia) (H), Jiivp-acndnm-zhmk disease, unspecified, Panlobular emphysema (H), Heartburn, Cough, Erectile dysfunction, unspecified erectile dysfunction type, Status post allogeneic bone marrow transplant (H), Diminished libido        Dose:  150 mg   Take 1 tablet (150 mg) by mouth At Bedtime   Quantity:  90 tablet   Refills:  6         Stop taking these medicines if you haven't already. Please contact your care team if you have questions.     senna-docusate 8.6-50 MG per tablet   Commonly known as:  SENOKOT-S;PERICOLACE   Stopped by:  Pradeep Turner MD                    Primary Care Provider Office Phone # Fax #    Sebas Leon [phone]       [address] [place] [address]        Equal Access to Services     [facility]: Hadii alison smith Soneyda, waaxda luqadaha, qaybta kaalmada adegregorio, rosalee vicente. So [facility] [phone].    ATENCIÓN: Si habla español, tiene a st disposición servicios gratuitos de asistencia lingüística. Llame al  [phone].    We comply with applicable federal civil rights laws and [place] laws. We do not discriminate on the basis of race, color, national origin, age, disability, sex, sexual orientation, or gender identity.            Thank you!     Thank you for choosing [facility]  for your care. Our goal is always to provide you with excellent care. Hearing back from our patients is one way we can continue to improve our services. Please take a few minutes to complete the written survey that you may receive in the mail after your visit with us. Thank you!             Your Updated Medication List - Protect others around you: Learn how to safely use, store and throw away your medicines at www.disposemymeds.org.          This list is accurate as of 7/23/18  4:33 PM.  Always use your most recent med list.                   Brand Name Dispense Instructions for use Diagnosis    acetaminophen 325 MG tablet    TYLENOL    100 tablet    Take 1-2 tablets every 4 hours as needed for pain.    Pain       diclofenac 1 % Gel topical gel    VOLTAREN    2 Tube    Apply topically 4 times daily    CML (chronic myelocytic leukemia) (H), Panlobular emphysema (H), Anxiety, Cough, Status post allogeneic bone marrow transplant (H), Rnulx-nweqig-wvqg disease, unspecified, Diminished libido, Erectile dysfunction, unspecified erectile dysfunction type, Heartburn       fluticasone-salmeterol 250-50 MCG/DOSE diskus inhaler    ADVAIR    1 Inhaler    Inhale 1 puff into the lungs 2 times daily    Panlobular emphysema (H), CML (chronic myelocytic leukemia) (H), Anxiety, Cough, Status post allogeneic bone marrow transplant (H), Tprem-vyizdu-cwiv disease, unspecified, Diminished libido, Erectile dysfunction, unspecified erectile dysfunction type, Heartburn       NICODERM CQ 21 MG/24HR 24 hr patch   Generic drug:  nicotine      Place 1 patch onto the skin every 24 hours        order for DME     2 Units    cold packs for home use         sildenafil 25 MG tablet    VIAGRA    12 tablet    Take 1-2 tablets (25-50 mg) by mouth daily as needed 30 min to 4 hrs before sex. Do not use with nitroglycerin, terazosin or doxazosin.    Erectile dysfunction, unspecified erectile dysfunction type       tiotropium 18 MCG capsule    SPIRIVA HANDIHALER    30 capsule    Inhale contents of one capsule daily.    Panlobular emphysema (H), CML (chronic myelocytic leukemia) (H), Anxiety, Cough, Status post allogeneic bone marrow transplant (H), Dgvkt-qsfqnf-oytb disease, unspecified, Diminished libido, Erectile dysfunction, unspecified erectile dysfunction type, Heartburn       traZODone 150 MG tablet    DESYREL    90 tablet    Take 1 tablet (150 mg) by mouth At Bedtime    Anxiety, CML (chronic myelocytic leukemia) (H), Dlfyu-fkytpw-psdm disease, unspecified, Panlobular emphysema (H), Heartburn, Cough, Erectile dysfunction, unspecified erectile dysfunction type, Status post allogeneic bone marrow transplant (H), Diminished libido

## 2018-11-16 ENCOUNTER — TELEPHONE (OUTPATIENT)
Dept: ONCOLOGY | Facility: CLINIC | Age: 38
End: 2018-11-16

## 2018-11-16 NOTE — TELEPHONE ENCOUNTER
TOBACCO TREATMENT INITIAL VISIT    Subjective:        Patient is a 38 year old White male and was contacted to participate in Tobacco Treatment Program for Nicotine Dependence 11/16/2018 by the Tobacco Treatment Specialist. Patient has been treated for  leukemia cancer. Patient  reports that he has been smoking Cigarettes.  He has a 25.00 pack-year smoking history. He has never used smokeless tobacco.     Patient has been a long time smoker. He has only quit smoking during his first transplant in 2006. He was quit for the duration of his hospital stay (2.5 months) until he was able to go outside for breaks. He reports immediately starting smoking again. He stated he did participate with a cessation class at the [facility], but doesn't feel he gave it a real attempt. He is currently using the 21 mg nicotine patch to try to quit, but is only wearing it once per week as he has ample loose tobacco left. He rolls his own cigarettes for cost savings and feels cessation medication is expensive. He is unsure about throwing out his remaining tobacco in order to quit smoking.     Patient would like to quit for better health. He is aware of all the reasons to quit including: better treatment outcomes, prevent recurrence or secondary cancers, improved lung function etc. He has had family members pass on from lung cancer. That is the most concerning cancer he is wanting to avoid at this time. He is also currently battling COPD and manages symptoms with an inhaler. He reports shortness of breath often and would like to be more active now that he's had 2 full knee replacements.     Although patient is motivated for improved health, he feels it is a challenge to quit since he has smoked since he was 12 years old. He also likes smoking for the taste of it (smokes menthols) and it fills his time as he is not working. He feels tobacco controls his life and would like to take back that control and not feel the need to use tobacco  anymore.        Information:      Agreed to Participate in Program - Yes    Proactive Outreach- Yes    Year of cancer diagnosis: 2011    Smoking Status:  Every Day Smoker    Has attempted to quit in the last 30 days:  No    Previous Cessation Medication Used - 21mg Nicotine Patch    Nicotine Product Used - Cigarettes    Age started smoking - 12    Amount of Use (CPD) - 20    Time to First Use -  < 30mins    Time of Last Cigarette: Smoked cigarette today (at least one puff)    Readiness (0-10) - 8    Barriers to quit- limited stress coping tools, severe withdrawal symptoms, cost of cessation medication, lack of support and Enjoys smoking        Summary of visit:      Caesar Richardson is still smoking/using tobacco: Yes  These MI interventions were used: Open-ended questions, Reflections: simple and complex and Change talk (evoked)  We discussed: Benefits of quitting  Ways to cope with cravings and manage triggers  Hints for managing nicotine withdrawal  Patient is not ready to quit smoking or continue to stay 100% smoke-free.        Plan:      Patient plans to continue wearing the nicotine patch and will considering purchasing the 2mg lozenge to manage cravings. Patient reports having 3 bags of loose tobacco and is unsure if he will smoke all of it or throw it away to quit.    MTM Consult Referral: declined, no prescription coverage

## 2018-12-07 ENCOUNTER — TELEPHONE (OUTPATIENT)
Dept: ONCOLOGY | Facility: CLINIC | Age: 38
End: 2018-12-07

## 2018-12-07 NOTE — TELEPHONE ENCOUNTER
Tobacco Treatment Team at the [facility] attempted to reach Mr. Richardson on 12/7/2018 for scheduled tobacco cessation visit to help Mr. Richardson to quit smoking. We will attempt to reach Mr. Richardson another time.

## 2019-01-17 ENCOUNTER — VIRTUAL VISIT (OUTPATIENT)
Dept: ONCOLOGY | Facility: CLINIC | Age: 39
End: 2019-01-17

## 2019-01-17 DIAGNOSIS — Z72.0 TOBACCO ABUSE DISORDER: Primary | ICD-10-CM

## 2019-01-17 NOTE — PROGRESS NOTES
TOBACCO TREATMENT FOLLOW UP VISIT    Subjective:      Patient is a 38 year old White male and was contacted to participate in Tobacco Treatment Program for Nicotine Dependence 1/17/2019 by the Tobacco Treatment Team. Patient  reports that he has been smoking cigarettes.  He has a 25.00 pack-year smoking history. he has never used smokeless tobacco.     Patient has been tobacco free for the last 3 days. He is using the nicotine patch and purchased the nicotine lozenge (4 mg) yesterday to use as needed. Patient reports quitting for 5 days in November, but relapsed after cravings came back after the 5th day. He feels more confident at this time since he has the lozenge to use if he has cravings.     He continues to be motivated for health reasons and sees no barriers at this time.        Information:      Agreed to Participate in Program - Yes    Session Number: 2            Proactive Outreach- Yes    Smoking Status:  Not at all    Has attempted to quit in the last 30 days:  Yes    Current Cessation Medication Being Used - 21mg Nicotine Patch and 4mg Nicotine Lozenge    Withdrawal Symptoms: No withdrawal symptoms reported    Nicotine Product Used - Cigarettes    Amount of Use (CPD) - 0    Time to First Use -  Not currently smoking     Time of Last Cigarette: 1 to 7 days ago    Readiness (0-10) - 8    Barriers to quit- Enjoys smoking        Summary of visit:      Caesar Richardson is still smoking/using tobacco: No  These MI interventions were used: Supported Autonomy, Collaboration, Evocation, Open-ended questions, Reflections: simple and complex and Change talk (evoked)  We discussed: Benefits of quitting  Ways to cope with cravings and manage triggers  Hints for managing nicotine withdrawal  Rewards for quitting  Total abstinence  Patient is ready to quit smoking or continue to stay 100% smoke-free.        Plan:      Quit Date: 1/14/19  Patient will continue nicotine patch daily and 4 mg nicotine lozenge as needed.      MTM Consult Completed: Declined, has medication

## 2019-01-31 ENCOUNTER — APPOINTMENT (OUTPATIENT)
Dept: ONCOLOGY | Facility: CLINIC | Age: 39
End: 2019-01-31
Attending: INTERNAL MEDICINE
Payer: MEDICARE

## 2019-01-31 PROCEDURE — 40000114 ZZH STATISTIC NO CHARGE CLINIC VISIT: Mod: ZF

## 2019-06-10 NOTE — PROGRESS NOTES
BMT CLINIC PROGRESS NOTE   Caesar Richardson is a 34 year old male with CML status post allo HCT in distant past, and DLI in 6/13/2012.  CC:  Here for scheduled routine f/u.    HPI:  Came by himself today. His father had surgery and is still recovering. He will be meeting his mother and aunt after our visit today. He is doing better today and very happy with the resolution of his social security issues. After reviewing his medical records, the latter we provided, and additional documentation the  resecured to his social security disability. He now has insurance for coverage of his medical needs, and that makes him very happy. He will restart with his inhalers, he will be making an appointment to see the orthopedic surgeons for his knees, and he actually has been trying to lose weight. According to our records he lost approximately 2 kg since the last visit. I praised him for that and encourage him to continue to work on it. He has no change in his shortness of breath. His knees continue to heart, on and off. He has limited mobility. He doesn't have any new gastrointestinal symptoms. His depression is improved. Oxide he has been under good control. He is still smoking but emphasized today that he is trying very hard to quit. He understands the importance of that for his overall health. This visit was changed from a morning to an afternoon appointment, thus he is not fasting. The measurement of his drug list arrives today is his misleadingly high. We will repeat his cholesterol and triglycerides with the next visit. Otherwise, no GI or  issues.   ROS:  10 point ROS negative except as noted above   Physical exam:/88  Pulse 96  Temp 98.4  F (36.9  C) (Oral)  Resp 16  Wt 95 kg (209 lb 8 oz)  SpO2 99%  BMI 32.81 kg/m2  Gen:  A few areas of skin erythema, but no skin plaques of lichenoid changes. Has acneiform rash on his forehead. Spiders angiomas on his upper chest and back.   HEENT: OP moist, no upper  Please let Dee know that her usn shows that her uterus measures larger than it did with last usn.   Her 2 fibroids have remained consistent, approx 3.5 cm each.   She can make appt with Robert Nunez DO to discuss these findings and her concerns with defecation.   lmp 4/2018.        DOLLY Nunez DO   teeth.  No mucositis or lichenoid changes.  Pulm: lungs with coarse sounds on both bases and no wheezing today.   CVS: RRR, no m/r/g   ABD +BS, soft, nontender, obese  EXT: no edema, no rashes, no edema.   SKIN: No rash.  Decorative tattoo on back.new forehead firm lesions.   MSK:  Crepitation on R forearm when moving his thumb.   Neuro/Psych:  Depressed affect, conversant, oriented, somewhat anxious     LABS:    Lab Results   Component Value Date    WBC 9.1 07/26/2017    ANEU 5.1 07/26/2017    HGB 14.9 07/26/2017    HCT 45.6 07/26/2017     07/26/2017     07/26/2017    POTASSIUM 4.1 07/26/2017    CHLORIDE 107 07/26/2017    CO2 26 07/26/2017    GLC 83 07/26/2017    BUN 16 07/26/2017    CR 1.24 07/26/2017    MAG 2.0 03/12/2014    INR 0.87 03/10/2014     Lab Results   Component Value Date    AST 58 07/26/2017     Lab Results   Component Value Date     07/26/2017     Lab Results   Component Value Date    BILICONJ 0.0 03/10/2014      Lab Results   Component Value Date    BILITOTAL 0.5 07/26/2017     Lab Results   Component Value Date    ALBUMIN 3.8 07/26/2017     Lab Results   Component Value Date    PROTTOTAL 7.5 07/26/2017      Lab Results   Component Value Date    ALKPHOS 170 07/26/2017     Bcr-Abl pending from today    ASSESSMENT AND PLAN:   1. Hematology/Chronic myelogenous leukemia:  Blood counts stable; morphologic remission last Bcr-Abl 3 months ago was undetectable. Repeat in the summer  - S/P DLI on 6/13/12 for CML relapse. Achieved remission.    2. ID: afebrile.     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 Spider angiomas could be liver disease but not enough enzyme change to support. Arthritis maybe GVHD but may do better with local steroid injection to avoid systemic effects, in particular emotional lability.     4. Anxiety/depression:  Improved depression and anxiety and mood. He was able to favorably resolve  his psycho-social issues and social security disability coverage.    5. FEN:  Still overweight but lost a little weight and I praised him for that. Reinforced that he needs to continue to work on loosing weight for his breathing, cholesterol, and joint issues.     6. GI: No c/o. Mildly elevated LFTs    7. Pulmonary: shortness of breath on exertion stable today. Recommended stop smoking again. he now has insurance coverage again and encouraged to use inhalers.     8. Skin: two new rashes today. Unable to afford creon for face rash. No fibrotic changes on huis skin. Spider angiomas on his chest and back (?)    9. Lipids: Cholesterol mildly elevated and triglycerides non-fasting today. Repeat next visit. Encouraged to change his diet and minimize sugars and flour (i.e. past). Encouraged to loose weight as above    RTC in  10/25 with labs; sooner if needed.  Continue to work on diet to loose weight and exercise.   We would hope he could loose weight 10-15 LBs until next visit  Stop smoking  Fast for next visit to measure lipid   He will call for knee appoitntment

## 2019-09-18 DIAGNOSIS — C92.10 CML (CHRONIC MYELOCYTIC LEUKEMIA) (H): Primary | ICD-10-CM

## 2019-09-19 ENCOUNTER — ONCOLOGY VISIT (OUTPATIENT)
Dept: TRANSPLANT | Facility: CLINIC | Age: 39
End: 2019-09-19
Attending: INTERNAL MEDICINE
Payer: MEDICARE

## 2019-09-19 VITALS
WEIGHT: 199 LBS | OXYGEN SATURATION: 98 % | BODY MASS INDEX: 30.16 KG/M2 | DIASTOLIC BLOOD PRESSURE: 64 MMHG | TEMPERATURE: 98.4 F | SYSTOLIC BLOOD PRESSURE: 123 MMHG | HEART RATE: 83 BPM | RESPIRATION RATE: 16 BRPM | HEIGHT: 68 IN

## 2019-09-19 DIAGNOSIS — C92.10 CML (CHRONIC MYELOCYTIC LEUKEMIA) (H): ICD-10-CM

## 2019-09-19 DIAGNOSIS — C92.10 CML (CHRONIC MYELOCYTIC LEUKEMIA) (H): Primary | ICD-10-CM

## 2019-09-19 LAB
ALBUMIN SERPL-MCNC: 3.7 G/DL (ref 3.4–5)
ALP SERPL-CCNC: 120 U/L (ref 40–150)
ALT SERPL W P-5'-P-CCNC: 68 U/L (ref 0–70)
ANION GAP SERPL CALCULATED.3IONS-SCNC: 6 MMOL/L (ref 3–14)
AST SERPL W P-5'-P-CCNC: 42 U/L (ref 0–45)
BASOPHILS # BLD AUTO: 0 10E9/L (ref 0–0.2)
BASOPHILS NFR BLD AUTO: 0.6 %
BILIRUB SERPL-MCNC: 0.3 MG/DL (ref 0.2–1.3)
BUN SERPL-MCNC: 20 MG/DL (ref 7–30)
CALCIUM SERPL-MCNC: 8.8 MG/DL (ref 8.5–10.1)
CHLORIDE SERPL-SCNC: 113 MMOL/L (ref 94–109)
CO2 SERPL-SCNC: 22 MMOL/L (ref 20–32)
CREAT SERPL-MCNC: 1.1 MG/DL (ref 0.66–1.25)
DIFFERENTIAL METHOD BLD: ABNORMAL
EOSINOPHIL # BLD AUTO: 0.1 10E9/L (ref 0–0.7)
EOSINOPHIL NFR BLD AUTO: 1.6 %
ERYTHROCYTE [DISTWIDTH] IN BLOOD BY AUTOMATED COUNT: 13.7 % (ref 10–15)
GFR SERPL CREATININE-BSD FRML MDRD: 84 ML/MIN/{1.73_M2}
GLUCOSE SERPL-MCNC: 109 MG/DL (ref 70–99)
HCT VFR BLD AUTO: 39.4 % (ref 40–53)
HGB BLD-MCNC: 13.2 G/DL (ref 13.3–17.7)
IMM GRANULOCYTES # BLD: 0 10E9/L (ref 0–0.4)
IMM GRANULOCYTES NFR BLD: 0.2 %
LYMPHOCYTES # BLD AUTO: 2.6 10E9/L (ref 0.8–5.3)
LYMPHOCYTES NFR BLD AUTO: 41.6 %
MCH RBC QN AUTO: 30.7 PG (ref 26.5–33)
MCHC RBC AUTO-ENTMCNC: 33.5 G/DL (ref 31.5–36.5)
MCV RBC AUTO: 92 FL (ref 78–100)
MONOCYTES # BLD AUTO: 0.7 10E9/L (ref 0–1.3)
MONOCYTES NFR BLD AUTO: 10.8 %
NEUTROPHILS # BLD AUTO: 2.9 10E9/L (ref 1.6–8.3)
NEUTROPHILS NFR BLD AUTO: 45.2 %
NRBC # BLD AUTO: 0 10*3/UL
NRBC BLD AUTO-RTO: 0 /100
PLATELET # BLD AUTO: 184 10E9/L (ref 150–450)
POTASSIUM SERPL-SCNC: 4.3 MMOL/L (ref 3.4–5.3)
PROT SERPL-MCNC: 6.7 G/DL (ref 6.8–8.8)
RBC # BLD AUTO: 4.3 10E12/L (ref 4.4–5.9)
SODIUM SERPL-SCNC: 141 MMOL/L (ref 133–144)
WBC # BLD AUTO: 6.3 10E9/L (ref 4–11)

## 2019-09-19 PROCEDURE — 81206 BCR/ABL1 GENE MAJOR BP: CPT | Performed by: INTERNAL MEDICINE

## 2019-09-19 PROCEDURE — G0463 HOSPITAL OUTPT CLINIC VISIT: HCPCS | Mod: ZF

## 2019-09-19 PROCEDURE — 80053 COMPREHEN METABOLIC PANEL: CPT | Performed by: INTERNAL MEDICINE

## 2019-09-19 PROCEDURE — 36415 COLL VENOUS BLD VENIPUNCTURE: CPT

## 2019-09-19 PROCEDURE — 85025 COMPLETE CBC W/AUTO DIFF WBC: CPT | Performed by: INTERNAL MEDICINE

## 2019-09-19 PROCEDURE — 82784 ASSAY IGA/IGD/IGG/IGM EACH: CPT | Performed by: INTERNAL MEDICINE

## 2019-09-19 ASSESSMENT — MIFFLIN-ST. JEOR: SCORE: 1792.16

## 2019-09-19 ASSESSMENT — PAIN SCALES - GENERAL: PAINLEVEL: EXTREME PAIN (9)

## 2019-09-19 NOTE — NURSING NOTE
Chief Complaint   Patient presents with     Labs Only     venipuncture, vitals checked     Perla Blair RN on 9/19/2019 at 11:07 AM

## 2019-09-19 NOTE — NURSING NOTE
"Oncology Rooming Note    September 19, 2019 11:20 AM   Caesar Richardson is a 39 year old male who presents for:    Chief Complaint   Patient presents with     Labs Only     venipuncture, vitals checked     RECHECK     Return: CML (chronic myelocytic leukemia)      Initial Vitals: /64   Pulse 83   Temp 98.4  F (36.9  C)   Resp 16   Ht 1.727 m (5' 8\")   Wt 90.3 kg (199 lb)   SpO2 98%   BMI 30.26 kg/m   Estimated body mass index is 30.26 kg/m  as calculated from the following:    Height as of this encounter: 1.727 m (5' 8\").    Weight as of this encounter: 90.3 kg (199 lb). Body surface area is 2.08 meters squared.  Extreme Pain (9) Comment: Data Unavailable   No LMP for male patient.  Allergies reviewed: Yes  Medications reviewed: Yes    Medications: Medication refills not needed today.  Pharmacy name entered into EPIC:    [facility]  [facility] [place], [place] - [phone]  [facility] [address] [place] [address] - [address] [place], [place] [address] [address]    Clinical concerns: N/A       Martha Ramos CMA              "

## 2019-09-19 NOTE — PROGRESS NOTES
HISTORY OF PRESENT ILLNESS:  I saw Caesar Richardson today in clinic 13 years status post allogeneic transplant for CML with relapse in 2012 and DLI. He has been in remission and is seen annually by Dr. Leon, who was unable to see the patient this year.        He states that he is functioning quite well.  He has a job now as a  and finds this pretty easy to do.  He has a history of possible COPD and has not had any recent exacerbations.      REVIEW OF SYSTEMS:  No fevers, chills, nausea, vomiting, diarrhea, cough, hemoptysis, shortness of breath, hematuria, dysuria, bruising or bleeding.  He has no problems swallowing.  He has no mouth lesions.  He does note that he has some small flat red lesions on his forearms that have risen over the last year.  The remainder of the 10-point review of systems is negative.      PHYSICAL EXAMINATION:   GENERAL:  He is actually a rather robust looking man in no apparent distress.   VITAL SIGNS:  Unremarkable.   HEENT:  Sclerae anicteric and noninjected.  Buccal mucosa was intact without lichenoid changes.   LYMPH:  No supraclavicular, cervical, axillary, inguinal adenopathy.   LUNGS:  Clear to auscultation.   CARDIAC:  Without S3, rub or murmur.   ABDOMEN:  Nondistended. Active bowel sounds.  No organomegaly.   EXTREMITIES:  Trace pedal edema.   SKIN:  He did have a series of 10 or 15 small red semi-linear flat lesions on his forearms.  No sclerodermatous changes.  No other skin lesions noted.      LABORATORY DATA:  Today, white count 6300 with a normal differential, hemoglobin 13.2, platelets 184,000.  Normal electrolyte panel with creatinine of 1.10.  Normal liver function tests.  Pending at the time of this dictation is a BCR-ABL major breakpoint quantitative analysis, which was not drawn prior to me seeing him and will be drawn separately.      ASSESSMENT AND PLAN:  He is status post allotransplant for CML in 2006 and DLI in 2012.  He currently has normal counts.   Await results of the BCR-ABL major breakpoint quantitative PCR..  His previously noted mild liver function test elevation is not manifesting on today's labs.  He is making a good effort to control his weight.  We will make a referral to Dermatology Clinic for evaluation of the red flat lesions on his forearms.  He will return again in approximately a year to see Dr. Leon.     Ruiz Lewis M.D.

## 2019-09-20 LAB — IGG SERPL-MCNC: 653 MG/DL (ref 695–1620)

## 2019-09-23 LAB — COPATH REPORT: NORMAL

## 2019-09-23 NOTE — TELEPHONE ENCOUNTER
Left vague msg.     Tobacco Treatment Program at the [facility] attempted to reach Mr. Richardson on 9/23/2019 regarding the tobacco cessation program to help Mr. Richardson to quit smoking. We will attempt to reach Mr. Richardson another time.     [facility]  Tobacco Treatment Specialist  PH: [phone]

## 2019-11-06 ENCOUNTER — HEALTH MAINTENANCE LETTER (OUTPATIENT)
Age: 39
End: 2019-11-06

## 2019-11-22 DIAGNOSIS — N52.9 ERECTILE DYSFUNCTION, UNSPECIFIED ERECTILE DYSFUNCTION TYPE: ICD-10-CM

## 2019-11-22 DIAGNOSIS — C92.10 CHRONIC MYELOID LEUKEMIA (H): Primary | ICD-10-CM

## 2019-11-22 RX ORDER — TADALAFIL 10 MG/1
10 TABLET ORAL DAILY PRN
Qty: 10 TABLET | Refills: 11 | Status: SHIPPED | OUTPATIENT
Start: 2019-11-22 | End: 2020-11-04

## 2020-02-16 ENCOUNTER — HEALTH MAINTENANCE LETTER (OUTPATIENT)
Age: 40
End: 2020-02-16

## 2020-03-10 DIAGNOSIS — Z94.81 STATUS POST ALLOGENEIC BONE MARROW TRANSPLANT (H): ICD-10-CM

## 2020-03-10 DIAGNOSIS — C92.10 CHRONIC MYELOID LEUKEMIA (H): Primary | ICD-10-CM

## 2020-03-11 ENCOUNTER — APPOINTMENT (OUTPATIENT)
Dept: LAB | Facility: CLINIC | Age: 40
End: 2020-03-11
Attending: INTERNAL MEDICINE
Payer: MEDICARE

## 2020-03-11 ENCOUNTER — ONCOLOGY VISIT (OUTPATIENT)
Dept: TRANSPLANT | Facility: CLINIC | Age: 40
End: 2020-03-11
Attending: INTERNAL MEDICINE
Payer: MEDICARE

## 2020-03-11 VITALS
DIASTOLIC BLOOD PRESSURE: 76 MMHG | OXYGEN SATURATION: 98 % | BODY MASS INDEX: 30.56 KG/M2 | SYSTOLIC BLOOD PRESSURE: 119 MMHG | TEMPERATURE: 98.3 F | HEART RATE: 81 BPM | WEIGHT: 201 LBS | RESPIRATION RATE: 18 BRPM

## 2020-03-11 DIAGNOSIS — Z94.81 STATUS POST ALLOGENEIC BONE MARROW TRANSPLANT (H): ICD-10-CM

## 2020-03-11 DIAGNOSIS — C92.10 CHRONIC MYELOID LEUKEMIA (H): ICD-10-CM

## 2020-03-11 LAB
ALBUMIN SERPL-MCNC: 3.5 G/DL (ref 3.4–5)
ALP SERPL-CCNC: 125 U/L (ref 40–150)
ALT SERPL W P-5'-P-CCNC: 89 U/L (ref 0–70)
ANION GAP SERPL CALCULATED.3IONS-SCNC: 5 MMOL/L (ref 3–14)
AST SERPL W P-5'-P-CCNC: 48 U/L (ref 0–45)
BASOPHILS # BLD AUTO: 0 10E9/L (ref 0–0.2)
BASOPHILS NFR BLD AUTO: 0.7 %
BILIRUB SERPL-MCNC: 0.3 MG/DL (ref 0.2–1.3)
BUN SERPL-MCNC: 12 MG/DL (ref 7–30)
CALCIUM SERPL-MCNC: 8.7 MG/DL (ref 8.5–10.1)
CHLORIDE SERPL-SCNC: 107 MMOL/L (ref 94–109)
CO2 SERPL-SCNC: 26 MMOL/L (ref 20–32)
CREAT SERPL-MCNC: 1.06 MG/DL (ref 0.66–1.25)
DIFFERENTIAL METHOD BLD: ABNORMAL
EOSINOPHIL # BLD AUTO: 0.1 10E9/L (ref 0–0.7)
EOSINOPHIL NFR BLD AUTO: 1.3 %
ERYTHROCYTE [DISTWIDTH] IN BLOOD BY AUTOMATED COUNT: 13.2 % (ref 10–15)
GFR SERPL CREATININE-BSD FRML MDRD: 88 ML/MIN/{1.73_M2}
GLUCOSE SERPL-MCNC: 111 MG/DL (ref 70–99)
HCT VFR BLD AUTO: 39.3 % (ref 40–53)
HGB BLD-MCNC: 12.9 G/DL (ref 13.3–17.7)
IMM GRANULOCYTES # BLD: 0 10E9/L (ref 0–0.4)
IMM GRANULOCYTES NFR BLD: 0.3 %
LYMPHOCYTES # BLD AUTO: 2.1 10E9/L (ref 0.8–5.3)
LYMPHOCYTES NFR BLD AUTO: 34.5 %
MCH RBC QN AUTO: 30.4 PG (ref 26.5–33)
MCHC RBC AUTO-ENTMCNC: 32.8 G/DL (ref 31.5–36.5)
MCV RBC AUTO: 93 FL (ref 78–100)
MONOCYTES # BLD AUTO: 0.5 10E9/L (ref 0–1.3)
MONOCYTES NFR BLD AUTO: 8.6 %
NEUTROPHILS # BLD AUTO: 3.3 10E9/L (ref 1.6–8.3)
NEUTROPHILS NFR BLD AUTO: 54.6 %
NRBC # BLD AUTO: 0 10*3/UL
NRBC BLD AUTO-RTO: 0 /100
PLATELET # BLD AUTO: 174 10E9/L (ref 150–450)
POTASSIUM SERPL-SCNC: 4.4 MMOL/L (ref 3.4–5.3)
PROT SERPL-MCNC: 6.5 G/DL (ref 6.8–8.8)
RBC # BLD AUTO: 4.24 10E12/L (ref 4.4–5.9)
SODIUM SERPL-SCNC: 138 MMOL/L (ref 133–144)
WBC # BLD AUTO: 6 10E9/L (ref 4–11)

## 2020-03-11 PROCEDURE — 36415 COLL VENOUS BLD VENIPUNCTURE: CPT

## 2020-03-11 PROCEDURE — 80053 COMPREHEN METABOLIC PANEL: CPT | Performed by: INTERNAL MEDICINE

## 2020-03-11 PROCEDURE — 82784 ASSAY IGA/IGD/IGG/IGM EACH: CPT | Performed by: INTERNAL MEDICINE

## 2020-03-11 PROCEDURE — 85025 COMPLETE CBC W/AUTO DIFF WBC: CPT | Performed by: INTERNAL MEDICINE

## 2020-03-11 PROCEDURE — 81206 BCR/ABL1 GENE MAJOR BP: CPT | Performed by: INTERNAL MEDICINE

## 2020-03-11 RX ORDER — OMEPRAZOLE 40 MG/1
40 CAPSULE, DELAYED RELEASE ORAL DAILY
Qty: 30 CAPSULE | Refills: 6 | Status: SHIPPED | OUTPATIENT
Start: 2020-03-11 | End: 2020-04-10

## 2020-03-11 ASSESSMENT — PAIN SCALES - GENERAL: PAINLEVEL: NO PAIN (0)

## 2020-03-11 NOTE — NURSING NOTE
"Oncology Rooming Note    March 11, 2020 12:35 PM   Caesar Richardson is a 39 year old male who presents for:    Chief Complaint   Patient presents with     Blood Draw     labs drawn via venipuncture by RN in lab     Initial Vitals: Blood Pressure 119/76 (BP Location: Left arm, Patient Position: Chair, Cuff Size: Adult Large)   Pulse 81   Temperature 98.3  F (36.8  C) (Oral)   Respiration 18   Weight 91.2 kg (201 lb)   Oxygen Saturation 98%   Body Mass Index 30.56 kg/m   Estimated body mass index is 30.56 kg/m  as calculated from the following:    Height as of 9/19/19: 1.727 m (5' 8\").    Weight as of this encounter: 91.2 kg (201 lb). Body surface area is 2.09 meters squared.  No Pain (0) Comment: Data Unavailable   No LMP for male patient.  Allergies reviewed: Yes  Medications reviewed: Yes    Medications: Medication refills not needed today.  Pharmacy name entered into [place]:    [facility]  [facility] [place], [place] - [phone]  [facility] [address] [place], [place] - [address] [facility] [address] [place], [place] - [address]    Clinical concerns: none       Lucie Van MA            "

## 2020-03-11 NOTE — PROGRESS NOTES
BMT CLINIC PROGRESS NOTE   Caesar Richardson is a 34 year old male with CML status post allo HCT 1/31/2006, in distant past, and DLI in 6/13/2012.  CC:  Here for scheduled routine f/u.    HPI:  Mr. Richardson comes today feeling reasonably well.  He has now been over 14 years since his transplant.  He is having a marked more stable life.  He was able to lose weight.  He is physically active.  However, he still smokes.  His mother now lives with him.  His brother is living with him temporarily.  He has dentures and is able to eat a good diet.  Still uses Cialis for erectile dysfunction.  He was able to put his life back together.  Emotionally he is doing reasonably well, considering.  Reports using his inhalers.    He reported some heart bladder more recently and was wondering if he could go back on omeprazole.  Otherwise, there are no other complaints and his complete review of systems except for what is mentioned above.     Physical exam:/76 (BP Location: Left arm, Patient Position: Chair, Cuff Size: Adult Large)   Pulse 81   Temp 98.3  F (36.8  C) (Oral)   Resp 18   Wt 91.2 kg (201 lb)   SpO2 98%   BMI 30.56 kg/m    Gen:  A few areas of skin erythema, but no skin plaques of lichenoid changes. Has acneiform rash on his forehead. Spiders angiomas on his upper chest and back.   HEENT: OP moist, no upper teeth.  No mucositis or lichenoid changes.  Pulm: lungs with NO coarse sounds today.   CVS: RRR, no m/r/g   ABD +BS, soft, nontender, obese  EXT: no edema, no rashes, no edema.   SKIN: No rash.  Decorative tattoo on back.new forehead firm lesions.   MSK:  Crepitation on R forearm when moving his thumb.   Neuro/Psych:  Depressed affect, conversant, oriented, somewhat anxious     LABS:  Lab Results   Component Value Date    WBC 6.0 03/11/2020    ANEU 3.3 03/11/2020    HGB 12.9 (L) 03/11/2020    HCT 39.3 (L) 03/11/2020     03/11/2020     03/11/2020    POTASSIUM 4.4 03/11/2020    CHLORIDE 107  03/11/2020    CO2 26 03/11/2020     (H) 03/11/2020    BUN 12 03/11/2020    CR 1.06 03/11/2020    MAG 2.0 03/12/2014    INR 0.93 03/04/2018    BILITOTAL 0.3 03/11/2020    AST 48 (H) 03/11/2020    ALT 89 (H) 03/11/2020    ALKPHOS 125 03/11/2020    PROTTOTAL 6.5 (L) 03/11/2020    ALBUMIN 3.5 03/11/2020     ASSESSMENT AND PLAN:   1. Hematology/Chronic myelogenous leukemia:  Blood counts stable;   - S/P DLI on 6/13/12 for CML relapse. Achieved remission.    2. ID: afebrile. No active infection.    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  In fact he does not seem to have any signs or symptoms of GVH other than mild laboratory changes.  Still has a very mild LFT elevation could be mild GVHD, not enough to biopsy or treat with systemic steroids. Spider angiomas could be liver disease but not enough enzyme change to support. Arthritis maybe GVHD but may do better with local steroid injection to avoid systemic effects, in particular emotional lability.     4. Anxiety/depression: Emotionally he is doing very well.  Rarely taking trazodone for sleep.  Got his his life back together.  Doing some driving occasionally.     5. FEN: Praised for good progress in his weight control and exercise.    6. GI: We will start him back on omeprazole 40 mg daily for a month.  We will give him refills in case he needs it.  Mildly elevated LFTs    7. Pulmonary: His lungs sounded better today.  He was again encouraged to use the inhalers.  He was once again encouraged to stop smoking.  I told him to talk to his mother and try to work on it together.      8. Skin: no rashes today. No fibrotic changes on huis skin. Spider angiomas on his chest and back (?)    9. Lipids: improved lipid profile with weight loss. Encouraged to lose more weight.     RTC Edward in 1 year; sooner if needed.  Needs stop smoking

## 2020-03-11 NOTE — NURSING NOTE
Chief Complaint   Patient presents with     Blood Draw     labs drawn via venipuncture by RN in lab     There were no vitals taken for this visit.    Labs collected and sent from left antecubital venipuncture in lab by RN. Pt tolerated well.   Pt checked in for next appointment.    Maris Clark RN

## 2020-03-12 LAB — IGG SERPL-MCNC: 564 MG/DL (ref 610–1616)

## 2020-03-13 LAB — COPATH REPORT: NORMAL

## 2020-11-04 ENCOUNTER — VIRTUAL VISIT (OUTPATIENT)
Dept: TRANSPLANT | Facility: CLINIC | Age: 40
End: 2020-11-04
Attending: INTERNAL MEDICINE
Payer: COMMERCIAL

## 2020-11-04 DIAGNOSIS — C92.10 CHRONIC MYELOID LEUKEMIA (H): ICD-10-CM

## 2020-11-04 DIAGNOSIS — N52.9 ERECTILE DYSFUNCTION, UNSPECIFIED ERECTILE DYSFUNCTION TYPE: ICD-10-CM

## 2020-11-04 PROCEDURE — 999N001193 HC VIDEO/TELEPHONE VISIT; NO CHARGE

## 2020-11-04 PROCEDURE — 99442 PR PHYSICIAN TELEPHONE EVALUATION 11-20 MIN: CPT | Performed by: INTERNAL MEDICINE

## 2020-11-04 RX ORDER — TADALAFIL 10 MG/1
10 TABLET ORAL DAILY PRN
Qty: 10 TABLET | Refills: 11 | Status: SHIPPED | OUTPATIENT
Start: 2020-11-04 | End: 2021-03-02

## 2020-11-04 RX ORDER — OMEPRAZOLE 40 MG/1
CAPSULE, DELAYED RELEASE ORAL
COMMUNITY
Start: 2020-10-16 | End: 2021-01-11

## 2020-11-04 RX ORDER — IBUPROFEN 200 MG
200 TABLET ORAL EVERY 4 HOURS PRN
COMMUNITY
End: 2021-11-05

## 2020-11-04 NOTE — LETTER
"    11/4/2020         RE: Caesar Richardson  [address] [address]        Dear Colleague,    Thank you for referring your patient, Caesar Richardson, to the [facility] BLOOD AND MARROW TRANSPLANT PROGRAM [place]. Please see a copy of my visit note below.    Caesar Richardson is a 40 year old male who is being evaluated via a billable telephone visit.      The patient has been notified of following:     \"This telephone visit will be conducted via a call between you and your physician/provider. We have found that certain health care needs can be provided without the need for a physical exam.  This service lets us provide the care you need with a short phone conversation.  If a prescription is necessary we can send it directly to your pharmacy.  If lab work is needed we can place an order for that and you can then stop by our lab to have the test done at a later time.    Telephone visits are billed at different rates depending on your insurance coverage. During this emergency period, for some insurers they may be billed the same as an in-person visit.  Please reach out to your insurance provider with any questions.    If during the course of the call the physician/provider feels a telephone visit is not appropriate, you will not be charged for this service.\"    Patient has given verbal consent for Telephone visit?  Yes    What phone number would you like to be contacted at? [phone]    How would you like to obtain your AVS? Mail a copy    I have reviewed and updated the patient's allergies and medication list.    Concerns: No new concerns.   Refills: unsure at time of intake call.      Vitals - Patient Reported  Weight (Patient Reported): 89.4 kg (197 lb)  Height (Patient Reported): 172.7 cm (5' 8\")  BMI (Based on Pt Reported Ht/Wt): 29.95  Pain Score: No Pain (0)    Josselyn Gandara [facility]    BMT CLINIC PROGRESS NOTE   Caesar Richardson is a 34 year old male with CML status post allo HCT 1/31/2006, in " distant past, and DLI in 6/13/2012.  CC:  scheduled routine f/u.    HPI:  Mr. Richardson reports that he has been doing well.  He has not been able to work but he now has his own home and he is taking care of his mother since his father passed away.  His brother joined them temporarily, but is no longer living with him.  His health has been okay.  His weight has been stable.  He continues to smoke.  He now has dentures that are working well for him and his been eating well.  He still has respiratory symptoms on and off but he says it is very hard to stop smoking.  He uses one of the inhalers.  Emotionally which has been his worst issues is under very good control now for at least the last 2 years with some stability in his life.  With his disability he is able to keep on with medications and he now establish a primary care physician closer to home.  He asked me to refill his Cialis today.        LABS: He had labs done outside in [place] but those were not available to me today.  We will follow up on getting them.      ASSESSMENT AND PLAN:   1. Hematology/Chronic myelogenous leukemia: We will get in touch with his primary care clinic to get this results.  - S/P DLI on 6/13/12 for CML relapse. Achieved remission.    2. ID: afebrile. No active infection.  I recommended that he gets a flu shot this fall and he thinks he already had 1.  I also recommended social distancing as COVID-19 precaution.    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  We will wait for the laboratory testing to see if there is any liver function abnormalities.  Did not complain of joint issues.     4. Anxiety/depression: Emotionally he is doing very well.  Rarely taking trazodone for sleep.  Got his his life back together.  Doing some driving occasionally.     5. FEN: I encouraged to continue to work on his weight that is down to 197 pounds.      6. GI: No change.  GI symptoms cannot  be monitored by his primary care physician.      7. Pulmonary: We talked about the need to use the inhalers and to stop smoking again.  He recently lost his that among other things to lung problems and he understands how this could impair his quality of life in the future.      8. Skin: No report of new rashes.  Primary care should monitor for rashes and skin cancer, at least annually.      9. Lipids: I do not have a lipid panel available to me today.  Encouraged the patient to continue to work on his weight.  Primary care could monitor and medicate hyperlipidemia, if clinically indicated.      CHANCE Leon in 1 year; sooner if needed.  Needs stop smoking    Phone call duration: 11 minutes    Sebas Leon MD          Again, thank you for allowing me to participate in the care of your patient.        Sincerely,        Sebas Leon MD

## 2020-11-04 NOTE — PROGRESS NOTES
"Caesar Richardson is a 40 year old male who is being evaluated via a billable telephone visit.      The patient has been notified of following:     \"This telephone visit will be conducted via a call between you and your physician/provider. We have found that certain health care needs can be provided without the need for a physical exam.  This service lets us provide the care you need with a short phone conversation.  If a prescription is necessary we can send it directly to your pharmacy.  If lab work is needed we can place an order for that and you can then stop by our lab to have the test done at a later time.    Telephone visits are billed at different rates depending on your insurance coverage. During this emergency period, for some insurers they may be billed the same as an in-person visit.  Please reach out to your insurance provider with any questions.    If during the course of the call the physician/provider feels a telephone visit is not appropriate, you will not be charged for this service.\"    Patient has given verbal consent for Telephone visit?  Yes    What phone number would you like to be contacted at? [phone]    How would you like to obtain your AVS? Mail a copy    I have reviewed and updated the patient's allergies and medication list.    Concerns: No new concerns.   Refills: unsure at time of intake call.      Vitals - Patient Reported  Weight (Patient Reported): 89.4 kg (197 lb)  Height (Patient Reported): 172.7 cm (5' 8\")  BMI (Based on Pt Reported Ht/Wt): 29.95  Pain Score: No Pain (0)    [facility]    BMT CLINIC PROGRESS NOTE   Caesar Richardson is a 34 year old male with CML status post allo HCT 1/31/2006, in distant past, and DLI in 6/13/2012.  CC:  scheduled routine f/u.    HPI:  Mr. Richardson reports that he has been doing well.  He has not been able to work but he now has his own home and he is taking care of his mother since his father passed away.  His brother joined them " temporarily, but is no longer living with him.  His health has been okay.  His weight has been stable.  He continues to smoke.  He now has dentures that are working well for him and his been eating well.  He still has respiratory symptoms on and off but he says it is very hard to stop smoking.  He uses one of the inhalers.  Emotionally which has been his worst issues is under very good control now for at least the last 2 years with some stability in his life.  With his disability he is able to keep on with medications and he now establish a primary care physician closer to home.  He asked me to refill his Cialis today.        LABS: He had labs done outside in [place] but those were not available to me today.  We will follow up on getting them.      ASSESSMENT AND PLAN:   1. Hematology/Chronic myelogenous leukemia: We will get in touch with his primary care clinic to get this results.  - S/P DLI on 6/13/12 for CML relapse. Achieved remission.    2. ID: afebrile. No active infection.  I recommended that he gets a flu shot this fall and he thinks he already had 1.  I also recommended social distancing as COVID-19 precaution.    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  We will wait for the laboratory testing to see if there is any liver function abnormalities.  Did not complain of joint issues.     4. Anxiety/depression: Emotionally he is doing very well.  Rarely taking trazodone for sleep.  Got his his life back together.  Doing some driving occasionally.     5. FEN: I encouraged to continue to work on his weight that is down to 197 pounds.      6. GI: No change.  GI symptoms cannot be monitored by his primary care physician.      7. Pulmonary: We talked about the need to use the inhalers and to stop smoking again.  He recently lost his that among other things to lung problems and he understands how this could impair his quality of life in the  future.      8. Skin: No report of new rashes.  Primary care should monitor for rashes and skin cancer, at least annually.      9. Lipids: I do not have a lipid panel available to me today.  Encouraged the patient to continue to work on his weight.  Primary care could monitor and medicate hyperlipidemia, if clinically indicated.      RTC Edward in 1 year; sooner if needed.  Needs stop smoking    Phone call duration: 11 minutes    Sebas Leon MD

## 2020-11-22 ENCOUNTER — HEALTH MAINTENANCE LETTER (OUTPATIENT)
Age: 40
End: 2020-11-22

## 2020-12-17 ENCOUNTER — TELEPHONE (OUTPATIENT)
Dept: TRANSPLANT | Facility: CLINIC | Age: 40
End: 2020-12-17
Payer: COMMERCIAL

## 2020-12-17 ENCOUNTER — VIRTUAL VISIT (OUTPATIENT)
Dept: TRANSPLANT | Facility: CLINIC | Age: 40
End: 2020-12-17
Attending: PHYSICIAN ASSISTANT
Payer: MEDICARE

## 2020-12-17 DIAGNOSIS — Z94.81 STATUS POST BONE MARROW TRANSPLANT (H): Primary | ICD-10-CM

## 2020-12-17 NOTE — LETTER
12/17/2020         RE: Caesar Richardson  [address] [address]        Dear Colleague,    Thank you for referring your patient, Caesar Richardson, to the [facility] BLOOD AND MARROW TRANSPLANT PROGRAM [place]. Please see a copy of my visit note below.    Please see other note dated 12/17.    Jerry Pascual PA-C  [phone]      Again, thank you for allowing me to participate in the care of your patient.        Sincerely,        BMT Advanced Practice Provider

## 2020-12-17 NOTE — TELEPHONE ENCOUNTER
"Pt called stating pt experiencing symptoms, has known pain/difficulty swallowing and breathing when head tilted up that is getting worse, answered no to all COVID-19 screening questions.  I informed pt that I will page an MARIELENA to give Petty call @ [phone] and if pt does not get a call back within half an hour to call [phone] again. Pt agreed. MARIELENA paged.    Pt endorses a 6 month history of \"sore throat' that was initially treated as strep throat by his local doctor. This discomfort has slowly gotten worse over the last few months and now notes difficulty swallowing certain textures of foods. Feels the food gets stuck right above his sternal notch. Notices it most with meat and casseroles but occasionally with liquids as well. Notes a \"tightening\" in this area of his throat when he tilts his head up as well. Sometimes he thinks this affects his breathing when he tilts his head up but otherwise has not noticed any changes in breathing or SOB. Denies any other stigmata of chronic GVHD such as mouth sores, worsening dry mouth or dry eyes, skin rashes or tightening of skin. Weight has been stable.     Pt lives in [place] and follows with Dr. Ervin at [facility]. Pt to reach out to Dr. Ervin to arrange an EGD to evaluate possible stricture or esophageal lesions. Once scheduled pt will call BMT to schedule follow up with Dr. Leon in the event pt has findings suggestive of chronic GVHD. If unable to scheduled EGD locally pt will reach out to BMT to get it scheduled here.     Notified Dr. Leon and Machelle Salinas RN of the above information/plan.    Jerry Pascual PA-C  [phone]    "

## 2020-12-22 ENCOUNTER — TELEPHONE (OUTPATIENT)
Dept: GASTROENTEROLOGY | Facility: CLINIC | Age: 40
End: 2020-12-22

## 2020-12-22 DIAGNOSIS — C92.10 CML (CHRONIC MYELOCYTIC LEUKEMIA) (H): ICD-10-CM

## 2020-12-22 DIAGNOSIS — Z94.81 STATUS POST BONE MARROW TRANSPLANT (H): Primary | ICD-10-CM

## 2020-12-23 NOTE — TELEPHONE ENCOUNTER
Patient is scheduled for EGD  with Dr. Choi    Spoke with: Caesar Richardson    Date of Procedure: 1/25/2021    [place] [facility] [place] C/S    Pre-op for Unit J MAC not needed    (if yes advise patient they will need a pre-op prior to procedure)      Is patient on blood thinners? -yes (If yes- inform patient to follow up with PCP or provider for follow up instructions)     Informed patient they will need an adult  yes    Informed Patient of COVID Test Requirement will scheduled in [place] gave covid number    Preferred Pharmacy for Pre Prescription on chart    Confirmed Nurse will call to complete assessment yes    Additional comments: no

## 2021-01-03 DIAGNOSIS — Z11.59 ENCOUNTER FOR SCREENING FOR OTHER VIRAL DISEASES: Primary | ICD-10-CM

## 2021-01-11 DIAGNOSIS — C92.10 CML (CHRONIC MYELOCYTIC LEUKEMIA) (H): ICD-10-CM

## 2021-01-11 DIAGNOSIS — Z94.81 STATUS POST BONE MARROW TRANSPLANT (H): Primary | ICD-10-CM

## 2021-01-11 RX ORDER — OMEPRAZOLE 40 MG/1
CAPSULE, DELAYED RELEASE ORAL
Qty: 30 CAPSULE | Refills: 11 | Status: SHIPPED | OUTPATIENT
Start: 2021-01-11 | End: 2021-02-19

## 2021-01-21 ENCOUNTER — OFFICE VISIT (OUTPATIENT)
Dept: LAB | Facility: CLINIC | Age: 41
End: 2021-01-21
Attending: INTERNAL MEDICINE
Payer: MEDICARE

## 2021-01-21 DIAGNOSIS — Z11.59 ENCOUNTER FOR SCREENING FOR OTHER VIRAL DISEASES: ICD-10-CM

## 2021-01-21 LAB
LABORATORY COMMENT REPORT: NORMAL
SARS-COV-2 RNA RESP QL NAA+PROBE: NEGATIVE
SARS-COV-2 RNA RESP QL NAA+PROBE: NORMAL
SPECIMEN SOURCE: NORMAL
SPECIMEN SOURCE: NORMAL

## 2021-01-21 PROCEDURE — U0003 INFECTIOUS AGENT DETECTION BY NUCLEIC ACID (DNA OR RNA); SEVERE ACUTE RESPIRATORY SYNDROME CORONAVIRUS 2 (SARS-COV-2) (CORONAVIRUS DISEASE [COVID-19]), AMPLIFIED PROBE TECHNIQUE, MAKING USE OF HIGH THROUGHPUT TECHNOLOGIES AS DESCRIBED BY CMS-2020-01-R: HCPCS | Performed by: PATHOLOGY

## 2021-01-21 PROCEDURE — U0005 INFEC AGEN DETEC AMPLI PROBE: HCPCS | Performed by: PATHOLOGY

## 2021-01-25 ENCOUNTER — HOSPITAL ENCOUNTER (OUTPATIENT)
Facility: AMBULATORY SURGERY CENTER | Age: 41
Discharge: HOME | End: 2021-01-25
Attending: INTERNAL MEDICINE | Admitting: INTERNAL MEDICINE
Payer: COMMERCIAL

## 2021-01-25 ENCOUNTER — TELEPHONE (OUTPATIENT)
Dept: GASTROENTEROLOGY | Facility: CLINIC | Age: 41
End: 2021-01-25

## 2021-01-25 VITALS
HEART RATE: 95 BPM | TEMPERATURE: 97.3 F | OXYGEN SATURATION: 97 % | WEIGHT: 195 LBS | DIASTOLIC BLOOD PRESSURE: 60 MMHG | BODY MASS INDEX: 27.92 KG/M2 | RESPIRATION RATE: 20 BRPM | HEIGHT: 70 IN | SYSTOLIC BLOOD PRESSURE: 105 MMHG

## 2021-01-25 DIAGNOSIS — K20.80 ESOPHAGITIS, LOS ANGELES GRADE C: Primary | ICD-10-CM

## 2021-01-25 LAB — UPPER GI ENDOSCOPY: NORMAL

## 2021-01-25 PROCEDURE — 43239 EGD BIOPSY SINGLE/MULTIPLE: CPT | Performed by: INTERNAL MEDICINE

## 2021-01-25 PROCEDURE — 88305 TISSUE EXAM BY PATHOLOGIST: CPT | Performed by: PATHOLOGY

## 2021-01-25 PROCEDURE — 88342 IMHCHEM/IMCYTCHM 1ST ANTB: CPT | Performed by: PATHOLOGY

## 2021-01-25 PROCEDURE — 88312 SPECIAL STAINS GROUP 1: CPT | Performed by: PATHOLOGY

## 2021-01-25 PROCEDURE — 88341 IMHCHEM/IMCYTCHM EA ADD ANTB: CPT | Performed by: PATHOLOGY

## 2021-01-25 PROCEDURE — G0500 MOD SEDAT ENDO SERVICE >5YRS: HCPCS | Performed by: INTERNAL MEDICINE

## 2021-01-25 PROCEDURE — 43239 EGD BIOPSY SINGLE/MULTIPLE: CPT

## 2021-01-25 RX ORDER — LIDOCAINE 40 MG/G
CREAM TOPICAL
Status: DISCONTINUED | OUTPATIENT
Start: 2021-01-25 | End: 2021-01-26 | Stop reason: HOSPADM

## 2021-01-25 RX ORDER — ONDANSETRON 4 MG/1
4 TABLET, ORALLY DISINTEGRATING ORAL EVERY 6 HOURS PRN
Status: CANCELLED | OUTPATIENT
Start: 2021-01-25

## 2021-01-25 RX ORDER — OMEPRAZOLE 40 MG/1
40 CAPSULE, DELAYED RELEASE ORAL
Qty: 60 CAPSULE | Refills: 3 | Status: SHIPPED | OUTPATIENT
Start: 2021-01-25 | End: 2021-06-21

## 2021-01-25 RX ORDER — ONDANSETRON 2 MG/ML
4 INJECTION INTRAMUSCULAR; INTRAVENOUS
Status: DISCONTINUED | OUTPATIENT
Start: 2021-01-25 | End: 2021-01-26 | Stop reason: HOSPADM

## 2021-01-25 RX ORDER — ONDANSETRON 2 MG/ML
4 INJECTION INTRAMUSCULAR; INTRAVENOUS EVERY 6 HOURS PRN
Status: CANCELLED | OUTPATIENT
Start: 2021-01-25

## 2021-01-25 RX ORDER — PROCHLORPERAZINE MALEATE 10 MG
10 TABLET ORAL EVERY 6 HOURS PRN
Status: CANCELLED | OUTPATIENT
Start: 2021-01-25

## 2021-01-25 RX ORDER — DIPHENHYDRAMINE HYDROCHLORIDE 50 MG/ML
INJECTION INTRAMUSCULAR; INTRAVENOUS PRN
Status: DISCONTINUED | OUTPATIENT
Start: 2021-01-25 | End: 2021-01-25 | Stop reason: HOSPADM

## 2021-01-25 RX ORDER — FLUMAZENIL 0.1 MG/ML
0.2 INJECTION, SOLUTION INTRAVENOUS
Status: CANCELLED | OUTPATIENT
Start: 2021-01-25 | End: 2021-01-25

## 2021-01-25 RX ORDER — SIMETHICONE
LIQUID (ML) MISCELLANEOUS PRN
Status: DISCONTINUED | OUTPATIENT
Start: 2021-01-25 | End: 2021-01-25 | Stop reason: HOSPADM

## 2021-01-25 RX ORDER — FENTANYL CITRATE 50 UG/ML
INJECTION, SOLUTION INTRAMUSCULAR; INTRAVENOUS PRN
Status: DISCONTINUED | OUTPATIENT
Start: 2021-01-25 | End: 2021-01-25 | Stop reason: HOSPADM

## 2021-01-25 RX ORDER — NALOXONE HYDROCHLORIDE 0.4 MG/ML
0.4 INJECTION, SOLUTION INTRAMUSCULAR; INTRAVENOUS; SUBCUTANEOUS
Status: CANCELLED | OUTPATIENT
Start: 2021-01-25 | End: 2021-01-26

## 2021-01-25 RX ORDER — NALOXONE HYDROCHLORIDE 0.4 MG/ML
0.2 INJECTION, SOLUTION INTRAMUSCULAR; INTRAVENOUS; SUBCUTANEOUS
Status: CANCELLED | OUTPATIENT
Start: 2021-01-25 | End: 2021-01-26

## 2021-01-25 ASSESSMENT — MIFFLIN-ST. JEOR: SCORE: 1794.41

## 2021-01-25 NOTE — TELEPHONE ENCOUNTER
Patient is scheduled for EGD with Dr. Murphy    Spoke with: Caesar Richardson    Date of Procedure: 4/26/2021    [place] [facility] [place]    Sedation Type C/S    Pre-op for Unit J MAC not needed    (if yes advise patient they will need a pre-op prior to procedure)      Is patient on blood thinners? -no (If yes- inform patient to follow up with PCP or provider for follow up instructions)     Informed patient they will need an adult  yes    Informed Patient of COVID Test Requirement yes and scheduled    Preferred Pharmacy for Pre Prescription on chart  Confirmed Nurse will call to complete assessment yes    Additional comments: no

## 2021-01-25 NOTE — TELEPHONE ENCOUNTER
1st schedule attempt    Procedure: Upper Endoscopy    Upper Endoscopy Type: EGD    Upper Endoscopy Sedation: Conscious/Moderate    Upper Endoscopy Reason for Procedure: check healing esophagitis    Preferred Location: [facility] [place]    Scheduling Instructions: If you have not heard from the scheduling office within 2 business days, please call [phone].           Associated Diagnoses    Esophagitis, [place] grade C [K20.80]  - Primary

## 2021-01-27 DIAGNOSIS — Z94.81 STATUS POST BONE MARROW TRANSPLANT (H): Primary | ICD-10-CM

## 2021-01-27 DIAGNOSIS — C92.10 CHRONIC MYELOID LEUKEMIA (H): ICD-10-CM

## 2021-01-27 LAB — COPATH REPORT: NORMAL

## 2021-01-28 NOTE — TELEPHONE ENCOUNTER
FUTURE VISIT INFORMATION      FUTURE VISIT INFORMATION:    Date: 2/8/2021    Time: 8AM    Location: [facility]  REFERRAL INFORMATION:    Referring provider:  Sebas Leon MD    Referring providers clinic:  [facility] [place]     Reason for visit/diagnosis  Status post bone marrow transplant, Chronic myeloid leukemia // Ref by Dr. Leon    RECORDS REQUESTED FROM:       Clinic name Comments Records Status Imaging Status   [facility] [place]  1/27/2021 referral from Sebas Leon MD [facility] OR 1/25/2021 ESOPHAGOGASTRODUODENOSCOPY, WITH BIOPSY [facility]    Imaging 3/8/2014 CT Maxillofacial  Epic PACS

## 2021-02-08 ENCOUNTER — PRE VISIT (OUTPATIENT)
Dept: OTOLARYNGOLOGY | Facility: CLINIC | Age: 41
End: 2021-02-08

## 2021-02-08 ENCOUNTER — OFFICE VISIT (OUTPATIENT)
Dept: OTOLARYNGOLOGY | Facility: CLINIC | Age: 41
End: 2021-02-08
Payer: COMMERCIAL

## 2021-02-08 ENCOUNTER — PREP FOR PROCEDURE (OUTPATIENT)
Dept: OTOLARYNGOLOGY | Facility: CLINIC | Age: 41
End: 2021-02-08

## 2021-02-08 VITALS — WEIGHT: 201 LBS | BODY MASS INDEX: 29.77 KG/M2 | HEIGHT: 69 IN

## 2021-02-08 DIAGNOSIS — C92.12 CML (CHRONIC MYELOID LEUKEMIA) IN RELAPSE (H): Primary | ICD-10-CM

## 2021-02-08 PROCEDURE — 31575 DIAGNOSTIC LARYNGOSCOPY: CPT | Performed by: STUDENT IN AN ORGANIZED HEALTH CARE EDUCATION/TRAINING PROGRAM

## 2021-02-08 PROCEDURE — 99204 OFFICE O/P NEW MOD 45 MIN: CPT | Mod: 25 | Performed by: STUDENT IN AN ORGANIZED HEALTH CARE EDUCATION/TRAINING PROGRAM

## 2021-02-08 RX ORDER — DEXAMETHASONE SODIUM PHOSPHATE 4 MG/ML
6 INJECTION, SOLUTION INTRA-ARTICULAR; INTRALESIONAL; INTRAMUSCULAR; INTRAVENOUS; SOFT TISSUE ONCE
Status: CANCELLED | OUTPATIENT
Start: 2021-02-08 | End: 2021-02-08

## 2021-02-08 ASSESSMENT — PAIN SCALES - GENERAL: PAINLEVEL: SEVERE PAIN (6)

## 2021-02-08 ASSESSMENT — MIFFLIN-ST. JEOR: SCORE: 1804.17

## 2021-02-08 NOTE — PROGRESS NOTES
Dear Dr. Leon:    I had the pleasure of meeting Caesar Richardson in consultation today at the [facility] at your request.     HISTORY OF PRESENT ILLNESS:   As you know, he is a pleasant 40 year old male with a history of CML who has undergone all hematocrit in 2006 and DLI in 2012. He is referred for evaluation of odynophagia and hoarseness.    This has been on going since July. He has a known hiatal hernia with many symptoms consistent with GERD. He has seen GI and a EGD was done recently which showed signs of esophagitis.     He wakes up daily with a sore throat and occasional otalgia. He has a sour, bitter taste in his mouth in the morning. He is on omeprazole for the GERD.     He also notices some occasional difficulty breathing and reports his voice is scratchy.     He denies any neck masses.    He is a smoker.     PAST MEDICAL HISTORY:   CML  COPD  GERD  GVHD    PAST SURGICAL HISTORY:   Past Surgical History:   Procedure Laterality Date     ARTHROPLASTY KNEE UNICOMPARTMENT BILATERAL Bilateral 3/1/2018    Procedure: ARTHROPLASTY KNEE UNICOMPARTMENT BILATERAL;  Bilateral Knee Uni Compartmental Arthroplasty;  Surgeon: Pradeep Turner MD;  Location: [facility] OR     BMT CELL PRODUCT INFUSION  2012     ESOPHAGOSCOPY, GASTROSCOPY, DUODENOSCOPY (EGD), COMBINED N/A 1/25/2021    Procedure: ESOPHAGOGASTRODUODENOSCOPY, WITH BIOPSY;  Surgeon: Meño Murphy MD;  Location: [facility] OR     ORTHOPEDIC SURGERY  2007     TRANSPLANT  2006    BMT       MEDICATIONS:     Current Outpatient Medications   Medication Sig Dispense Refill     diclofenac (VOLTAREN) 1 % GEL Apply topically 4 times daily 2 Tube 6     fluticasone-salmeterol (ADVAIR) 250-50 MCG/DOSE diskus inhaler Inhale 1 puff into the lungs 2 times daily 1 Inhaler 12     ibuprofen (ADVIL/MOTRIN) 200 MG tablet Take 200 mg by mouth every 4 hours as needed for mild pain       nicotine (NICODERM CQ) 21 MG/24HR 24 hr patch Place 1  "patch onto the skin every 24 hours       omeprazole (PRILOSEC) 40 MG DR capsule Take 1 capsule (40 mg) by mouth 2 times daily (before meals) 60 capsule 3     omeprazole (PRILOSEC) 40 MG DR capsule TAKE 1 CAPSULE BY MOUTH ONCE DAILY (Patient taking differently: 40 mg 2 times daily TAKE 1 CAPSULE BY MOUTH ONCE DAILY) 30 capsule 11     tadalafil (CIALIS) 10 MG tablet Take 1 tablet (10 mg) by mouth daily as needed (take 1 tablet 30 min prior to sexual intercourse) 10 tablet 11     tiotropium (SPIRIVA HANDIHALER) 18 MCG capsule Inhale contents of one capsule daily. (Patient not taking: Reported on 11/4/2020) 30 capsule 12     traZODone (DESYREL) 150 MG tablet Take 1 tablet (150 mg) by mouth At Bedtime (Patient not taking: Reported on 11/4/2020) 90 tablet 6       ALLERGIES:  No Known Allergies    HABITS/SOCIAL HISTORY:   Current smoker  Smokes 1 PPD  Rare ETOH    FAMILY HISTORY:    Family History   Problem Relation Age of Onset     Diabetes Father      Arthritis Father      Heart Disease Father      Respiratory Father      Allergies Mother      Respiratory Mother      Asthma Brother      Allergies Brother      Diabetes Paternal Grandmother        REVIEW OF SYSTEMS:  12 point ROS was negative other than the symptoms noted above in the HPI.    PHYSICAL EXAMINATION:   Ht 1.74 m (5' 8.5\")   Wt 91.2 kg (201 lb)   BMI 30.12 kg/m     Alert,NAD  Scratchy voice  Breathing comfortably  No palpable lymphadenopathy  Edentulous maxilla. Some teeth in mandible.  No lesions in oral cavity or oropharynx  Base of tongue soft  CN II-XII intact    PROCEDURES:   Fiberoptic laryngoscopy was performed. Scope advanced into the left nasal cavity. Nasopharynx clear. Base of tongue and vallecula clear. Epiglottis sharp without lesions. Piriform sinuses clear. Intraarytenoid pachydermia present. Diffuse edema of cords bilaterally. Signs consistent with acid reflux. Glottic airway patent.     RESULTS REVIEWED: I have reviewed his recent EGD " findings and Dr Leon's notes    IMPRESSION AND PLAN:   40M with history of CML and smoking presenting with odynophagia and hoarseness. His history and exam is most consistent with GERD and laryngopharyngeal reflux. He has a known hiatal hernia. His scope exam shows signs of reflux without any obvious malignancy. However, given his smoking history and CML I talked to him about proceeding to the OR for a direct laryngoscopy/biopsy vs observation. He understands this is most likely reflux related and not malignant but would like to proceed with the biopsy. We will help him arrange this. He understands this is not a treatment for his GERD.     Thank you very much for the opportunity to participate in the care of your patient.      Santo Sigala MD  Otolaryngology- Head & Neck Surgery    45 minutes spent on the date of the encounter in chart review, patient visit, review of tests, documentation and/or discussion with other providers about the issues documented above.

## 2021-02-08 NOTE — NURSING NOTE
"Chief Complaint   Patient presents with     RECHECK     Status post bone marrow transplant       Height 1.74 m (5' 8.5\"), weight 91.2 kg (201 lb).    Elizabeth Reynolds, EMT    "

## 2021-02-08 NOTE — PATIENT INSTRUCTIONS
1. We will call you to arrange date and time for procedure.  2. Patient to schedule a pre-op physical with their primary MD within 30 days of the procedure.   3. Patient to avoid blood thinning medications 1 week prior to surgery (Ibuprofen, Aleve, Aspirin, etc.)   4. Patient to review contents within the surgical packet & use the antiseptic scrub as directed.   5. Patient to call the ENT clinic with further questions or concerns: [phone].

## 2021-02-08 NOTE — LETTER
2/8/2021       RE: Caesar Richardson  [address] [facility] [address]     Dear Colleague,    Thank you for referring your patient, Caesar Richardson, to the [facility] EAR NOSE AND THROAT CLINIC [place] at [facility]. Please see a copy of my visit note below.    Dear Dr. Leon:    I had the pleasure of meeting Caesar Richardson in consultation today at the [facility] Otolaryngology Clinic at your request.     HISTORY OF PRESENT ILLNESS:   As you know, he is a pleasant 40 year old male with a history of CML who has undergone all hematocrit in 2006 and DLI in 2012. He is referred for evaluation of odynophagia and hoarseness.    This has been on going since July. He has a known hiatal hernia with many symptoms consistent with GERD. He has seen GI and a EGD was done recently which showed signs of esophagitis.     He wakes up daily with a sore throat and occasional otalgia. He has a sour, bitter taste in his mouth in the morning. He is on omeprazole for the GERD.     He also notices some occasional difficulty breathing and reports his voice is scratchy.     He denies any neck masses.    He is a smoker.     PAST MEDICAL HISTORY:   CML  COPD  GERD  GVHD    PAST SURGICAL HISTORY:   Past Surgical History:   Procedure Laterality Date     ARTHROPLASTY KNEE UNICOMPARTMENT BILATERAL Bilateral 3/1/2018    Procedure: ARTHROPLASTY KNEE UNICOMPARTMENT BILATERAL;  Bilateral Knee Uni Compartmental Arthroplasty;  Surgeon: Pradeep Turner MD;  Location:  OR     BMT CELL PRODUCT INFUSION  2012     ESOPHAGOSCOPY, GASTROSCOPY, DUODENOSCOPY (EGD), COMBINED N/A 1/25/2021    Procedure: ESOPHAGOGASTRODUODENOSCOPY, WITH BIOPSY;  Surgeon: Meño Murphy MD;  Location: [facility] OR     ORTHOPEDIC SURGERY  2007     TRANSPLANT  2006    BMT       MEDICATIONS:     Current Outpatient Medications   Medication Sig Dispense Refill     diclofenac (VOLTAREN) 1 % GEL Apply  "topically 4 times daily 2 Tube 6     fluticasone-salmeterol (ADVAIR) 250-50 MCG/DOSE diskus inhaler Inhale 1 puff into the lungs 2 times daily 1 Inhaler 12     ibuprofen (ADVIL/MOTRIN) 200 MG tablet Take 200 mg by mouth every 4 hours as needed for mild pain       nicotine (NICODERM CQ) 21 MG/24HR 24 hr patch Place 1 patch onto the skin every 24 hours       omeprazole (PRILOSEC) 40 MG DR capsule Take 1 capsule (40 mg) by mouth 2 times daily (before meals) 60 capsule 3     omeprazole (PRILOSEC) 40 MG DR capsule TAKE 1 CAPSULE BY MOUTH ONCE DAILY (Patient taking differently: 40 mg 2 times daily TAKE 1 CAPSULE BY MOUTH ONCE DAILY) 30 capsule 11     tadalafil (CIALIS) 10 MG tablet Take 1 tablet (10 mg) by mouth daily as needed (take 1 tablet 30 min prior to sexual intercourse) 10 tablet 11     tiotropium (SPIRIVA HANDIHALER) 18 MCG capsule Inhale contents of one capsule daily. (Patient not taking: Reported on 11/4/2020) 30 capsule 12     traZODone (DESYREL) 150 MG tablet Take 1 tablet (150 mg) by mouth At Bedtime (Patient not taking: Reported on 11/4/2020) 90 tablet 6       ALLERGIES:  No Known Allergies    HABITS/SOCIAL HISTORY:   Current smoker  Smokes 1 PPD  Rare ETOH    FAMILY HISTORY:    Family History   Problem Relation Age of Onset     Diabetes Father      Arthritis Father      Heart Disease Father      Respiratory Father      Allergies Mother      Respiratory Mother      Asthma Brother      Allergies Brother      Diabetes Paternal Grandmother        REVIEW OF SYSTEMS:  12 point ROS was negative other than the symptoms noted above in the HPI.    PHYSICAL EXAMINATION:   Ht 1.74 m (5' 8.5\")   Wt 91.2 kg (201 lb)   BMI 30.12 kg/m     Alert,NAD  Scratchy voice  Breathing comfortably  No palpable lymphadenopathy  Edentulous maxilla. Some teeth in mandible.  No lesions in oral cavity or oropharynx  Base of tongue soft  CN II-XII intact    PROCEDURES:   Fiberoptic laryngoscopy was performed. Scope advanced into the " left nasal cavity. Nasopharynx clear. Base of tongue and vallecula clear. Epiglottis sharp without lesions. Piriform sinuses clear. Intraarytenoid pachydermia present. Diffuse edema of cords bilaterally. Signs consistent with acid reflux. Glottic airway patent.     RESULTS REVIEWED: I have reviewed his recent EGD findings and Dr Leon's notes    IMPRESSION AND PLAN:   40M with history of CML and smoking presenting with odynophagia and hoarseness. His history and exam is most consistent with GERD and laryngopharyngeal reflux. He has a known hiatal hernia. His scope exam shows signs of reflux without any obvious malignancy. However, given his smoking history and CML I talked to him about proceeding to the OR for a direct laryngoscopy/biopsy vs observation. He understands this is most likely reflux related and not malignant but would like to proceed with the biopsy. We will help him arrange this. He understands this is not a treatment for his GERD.     Thank you very much for the opportunity to participate in the care of your patient.      Santo Sigala MD  Otolaryngology- Head & Neck Surgery    45 minutes spent on the date of the encounter in chart review, patient visit, review of tests, documentation and/or discussion with other providers about the issues documented above.

## 2021-02-09 DIAGNOSIS — Z11.59 ENCOUNTER FOR SCREENING FOR OTHER VIRAL DISEASES: ICD-10-CM

## 2021-02-10 ENCOUNTER — TELEPHONE (OUTPATIENT)
Dept: OTOLARYNGOLOGY | Facility: CLINIC | Age: 41
End: 2021-02-10

## 2021-02-10 NOTE — TELEPHONE ENCOUNTER
Called patient to schedule surgery    Surgeon: Dr. Sigala  Date of Surgery: 02/23/2021  Location of surgery: [place] OR  Pre-Op H&P: PCP clinic   Post-Op Appt Date: 1 week    Imaging needed:  No  Discussed COVID-19 testing:  Yes  Pre-cert/Authorization completed:  No  Packet sent out: Yes 02/10/21    Patient states he has packet from clinic. Patient will have covid-19 test completed locally. Asked patient to call clinic if there was any issues completing within 4 days of surgery. Patient plans on arriving in [facility] the night before, plan for 6:00am arrival. Patient has no further questions or concerns.     Gloria Muniz  02/10/21 9:29 AM

## 2021-02-11 ENCOUNTER — DOCUMENTATION ONLY (OUTPATIENT)
Dept: TRANSPLANT | Facility: CLINIC | Age: 41
End: 2021-02-11

## 2021-02-17 ENCOUNTER — VIRTUAL VISIT (OUTPATIENT)
Dept: TRANSPLANT | Facility: CLINIC | Age: 41
End: 2021-02-17
Attending: INTERNAL MEDICINE
Payer: COMMERCIAL

## 2021-02-17 DIAGNOSIS — Z94.81 STATUS POST BONE MARROW TRANSPLANT (H): Primary | ICD-10-CM

## 2021-02-17 PROCEDURE — 999N001193 HC VIDEO/TELEPHONE VISIT; NO CHARGE

## 2021-02-17 PROCEDURE — 99443 PR PHYSICIAN TELEPHONE EVALUATION 21-30 MIN: CPT | Mod: 95 | Performed by: INTERNAL MEDICINE

## 2021-02-17 NOTE — LETTER
"    2/17/2021         RE: Caesar Richardson  [address] [facility] [address]        Dear Colleague,    Thank you for referring your patient, Caesar Richardson, to the [facility] BLOOD AND MARROW TRANSPLANT PROGRAM [place]. Please see a copy of my visit note below.    Caesar is a 40 year old who is being evaluated via a billable telephone visit.      What phone number would you like to be contacted at? [phone]    How would you like to obtain your AVS? MyChart     Vitals - Patient Reported  Height (Patient Reported): 174 cm (5' 8.5\")  Pain Score: Severe Pain (6)  Pain Loc: Throat    Patient states he doesn't have ability to do video visit - changing over to telephone visit.    Maurilio Dhillon LPN         Today, 2/17/2021 we had a virtual telephone visit with the patient.  This was originally scheduled as a virtual video visit but the patient was unable to connect.  In this visit we reviewed recent testing and plan further evaluation for the odynophagia and hoarseness that the patient developed a few months back.  This was not as bad when I last saw him in November, according to the patient.  It clearly worsened in the last couple of months.  He continues to have significant acid reflux, and reports waking up in the middle of the night with acid in his nose, occasionally.    Continues to smoke and continues to try very hard to stop but he states it is difficult.  Emotionally, he has been doing well and it is very stable with a good environment in [place].  He now is living with his mom and he purchased a house down there.  The stable environment has been very good to him.    He has a ENT exam under general anesthesia with a biopsy planned for next week and he is somewhat concerned that the Covid testing has not been scheduled yet for this coming Friday.  Otherwise he understands the need and its in favor of getting this evaluated as soon as possible.    We talked about the concern that this could be in a new " primary/secondary malignancy, but the evidence available so far suggest that this is severe acid reflux.  Nonetheless, the biopsy is recommended in the context of his long lasting history of CML with previous total body radiation, chronic long-term smoking, and a history of cancer.    I once again encouraged him to stop smoking and to try to do it immediately.  He will give us a call once the endoscopy is been done so that we can follow-up on those results and help with neck steps, if necessary.  We briefly discussed that if this is all related to reflux will need advice from gastroenterology and GI surgery on additional treatment options for long-term control.    We also agreed to have a a planned visit in about 4 months for our continued follow-up.  If things are back settled down we will go back to yearly follow-ups.    I spent approximately 20 minutes on the telephone visit with the patient today with additional 10 minutes dedicated to reviewing the records/results of the testing recently done and contacting our nurse coordinator for coordination of care.     Sebas Leon MD on 2/17/2021 at 9:28 AM        Again, thank you for allowing me to participate in the care of your patient.        Sincerely,        Sebas Leon MD

## 2021-02-17 NOTE — PROGRESS NOTES
"Caesar is a 40 year old who is being evaluated via a billable telephone visit.      What phone number would you like to be contacted at? [phone]    How would you like to obtain your AVS? Alexys     Vitals - Patient Reported  Height (Patient Reported): 174 cm (5' 8.5\")  Pain Score: Severe Pain (6)  Pain Loc: Throat    Patient states he doesn't have ability to do video visit - changing over to telephone visit.    Maurilio Dhillon LPN         Today, 2/17/2021 we had a virtual telephone visit with the patient.  This was originally scheduled as a virtual video visit but the patient was unable to connect.  In this visit we reviewed recent testing and plan further evaluation for the odynophagia and hoarseness that the patient developed a few months back.  This was not as bad when I last saw him in November, according to the patient.  It clearly worsened in the last couple of months.  He continues to have significant acid reflux, and reports waking up in the middle of the night with acid in his nose, occasionally.    Continues to smoke and continues to try very hard to stop but he states it is difficult.  Emotionally, he has been doing well and it is very stable with a good environment in [place].  He now is living with his mom and he purchased a house down there.  The stable environment has been very good to him.    He has a ENT exam under general anesthesia with a biopsy planned for next week and he is somewhat concerned that the Covid testing has not been scheduled yet for this coming Friday.  Otherwise he understands the need and its in favor of getting this evaluated as soon as possible.    We talked about the concern that this could be in a new primary/secondary malignancy, but the evidence available so far suggest that this is severe acid reflux.  Nonetheless, the biopsy is recommended in the context of his long lasting history of CML with previous total body radiation, chronic long-term smoking, and a history " of cancer.    I once again encouraged him to stop smoking and to try to do it immediately.  He will give us a call once the endoscopy is been done so that we can follow-up on those results and help with neck steps, if necessary.  We briefly discussed that if this is all related to reflux will need advice from gastroenterology and GI surgery on additional treatment options for long-term control.    We also agreed to have a a planned visit in about 4 months for our continued follow-up.  If things are back settled down we will go back to yearly follow-ups.    I spent approximately 20 minutes on the telephone visit with the patient today with additional 10 minutes dedicated to reviewing the records/results of the testing recently done and contacting our nurse coordinator for coordination of care.     Sebas Leon MD on 2/17/2021 at 9:28 AM

## 2021-02-19 DIAGNOSIS — Z20.822 SUSPECTED 2019 NOVEL CORONAVIRUS INFECTION: Primary | ICD-10-CM

## 2021-02-19 DIAGNOSIS — Z11.59 ENCOUNTER FOR SCREENING FOR OTHER VIRAL DISEASES: ICD-10-CM

## 2021-02-19 PROCEDURE — U0005 INFEC AGEN DETEC AMPLI PROBE: HCPCS | Performed by: STUDENT IN AN ORGANIZED HEALTH CARE EDUCATION/TRAINING PROGRAM

## 2021-02-19 PROCEDURE — 99207 PR NO CHARGE LOS: CPT

## 2021-02-19 PROCEDURE — U0003 INFECTIOUS AGENT DETECTION BY NUCLEIC ACID (DNA OR RNA); SEVERE ACUTE RESPIRATORY SYNDROME CORONAVIRUS 2 (SARS-COV-2) (CORONAVIRUS DISEASE [COVID-19]), AMPLIFIED PROBE TECHNIQUE, MAKING USE OF HIGH THROUGHPUT TECHNOLOGIES AS DESCRIBED BY CMS-2020-01-R: HCPCS | Performed by: STUDENT IN AN ORGANIZED HEALTH CARE EDUCATION/TRAINING PROGRAM

## 2021-02-20 LAB
SARS-COV-2 RNA RESP QL NAA+PROBE: NORMAL
SPECIMEN SOURCE: NORMAL

## 2021-02-21 LAB
LABORATORY COMMENT REPORT: NORMAL
SARS-COV-2 RNA RESP QL NAA+PROBE: NEGATIVE
SPECIMEN SOURCE: NORMAL

## 2021-02-22 ENCOUNTER — ANESTHESIA EVENT (OUTPATIENT)
Dept: SURGERY | Facility: CLINIC | Age: 41
End: 2021-02-22
Payer: COMMERCIAL

## 2021-02-23 ENCOUNTER — HOSPITAL ENCOUNTER (OUTPATIENT)
Facility: CLINIC | Age: 41
Discharge: HOME | End: 2021-02-23
Attending: STUDENT IN AN ORGANIZED HEALTH CARE EDUCATION/TRAINING PROGRAM | Admitting: STUDENT IN AN ORGANIZED HEALTH CARE EDUCATION/TRAINING PROGRAM
Payer: COMMERCIAL

## 2021-02-23 ENCOUNTER — ANESTHESIA (OUTPATIENT)
Dept: SURGERY | Facility: CLINIC | Age: 41
End: 2021-02-23
Payer: COMMERCIAL

## 2021-02-23 VITALS
HEIGHT: 69 IN | DIASTOLIC BLOOD PRESSURE: 88 MMHG | BODY MASS INDEX: 28.64 KG/M2 | SYSTOLIC BLOOD PRESSURE: 110 MMHG | OXYGEN SATURATION: 97 % | TEMPERATURE: 97.9 F | RESPIRATION RATE: 16 BRPM | WEIGHT: 193.34 LBS | HEART RATE: 86 BPM

## 2021-02-23 DIAGNOSIS — C92.12 CML (CHRONIC MYELOID LEUKEMIA) IN RELAPSE (H): ICD-10-CM

## 2021-02-23 LAB
ABO + RH BLD: NORMAL
ABO + RH BLD: NORMAL
BLD GP AB SCN SERPL QL: NORMAL
BLOOD BANK CMNT PATIENT-IMP: NORMAL
SPECIMEN EXP DATE BLD: NORMAL

## 2021-02-23 PROCEDURE — 86901 BLOOD TYPING SEROLOGIC RH(D): CPT | Performed by: ANESTHESIOLOGY

## 2021-02-23 PROCEDURE — 258N000003 HC RX IP 258 OP 636: Performed by: ANESTHESIOLOGY

## 2021-02-23 PROCEDURE — 86850 RBC ANTIBODY SCREEN: CPT | Performed by: ANESTHESIOLOGY

## 2021-02-23 PROCEDURE — 250N000011 HC RX IP 250 OP 636: Performed by: STUDENT IN AN ORGANIZED HEALTH CARE EDUCATION/TRAINING PROGRAM

## 2021-02-23 PROCEDURE — 88305 TISSUE EXAM BY PATHOLOGIST: CPT | Mod: TC | Performed by: STUDENT IN AN ORGANIZED HEALTH CARE EDUCATION/TRAINING PROGRAM

## 2021-02-23 PROCEDURE — 250N000025 HC SEVOFLURANE, PER MIN: Performed by: STUDENT IN AN ORGANIZED HEALTH CARE EDUCATION/TRAINING PROGRAM

## 2021-02-23 PROCEDURE — 88312 SPECIAL STAINS GROUP 1: CPT | Mod: 26 | Performed by: PATHOLOGY

## 2021-02-23 PROCEDURE — 370N000017 HC ANESTHESIA TECHNICAL FEE, PER MIN: Performed by: STUDENT IN AN ORGANIZED HEALTH CARE EDUCATION/TRAINING PROGRAM

## 2021-02-23 PROCEDURE — 710N000010 HC RECOVERY PHASE 1, LEVEL 2, PER MIN: Performed by: STUDENT IN AN ORGANIZED HEALTH CARE EDUCATION/TRAINING PROGRAM

## 2021-02-23 PROCEDURE — 88312 SPECIAL STAINS GROUP 1: CPT | Mod: TC | Performed by: STUDENT IN AN ORGANIZED HEALTH CARE EDUCATION/TRAINING PROGRAM

## 2021-02-23 PROCEDURE — 999N000141 HC STATISTIC PRE-PROCEDURE NURSING ASSESSMENT: Performed by: STUDENT IN AN ORGANIZED HEALTH CARE EDUCATION/TRAINING PROGRAM

## 2021-02-23 PROCEDURE — 86900 BLOOD TYPING SEROLOGIC ABO: CPT | Performed by: ANESTHESIOLOGY

## 2021-02-23 PROCEDURE — 250N000009 HC RX 250: Performed by: NURSE ANESTHETIST, CERTIFIED REGISTERED

## 2021-02-23 PROCEDURE — 360N000076 HC SURGERY LEVEL 3, PER MIN: Performed by: STUDENT IN AN ORGANIZED HEALTH CARE EDUCATION/TRAINING PROGRAM

## 2021-02-23 PROCEDURE — 36415 COLL VENOUS BLD VENIPUNCTURE: CPT | Performed by: ANESTHESIOLOGY

## 2021-02-23 PROCEDURE — 272N000001 HC OR GENERAL SUPPLY STERILE: Performed by: STUDENT IN AN ORGANIZED HEALTH CARE EDUCATION/TRAINING PROGRAM

## 2021-02-23 PROCEDURE — 88305 TISSUE EXAM BY PATHOLOGIST: CPT | Mod: 26 | Performed by: PATHOLOGY

## 2021-02-23 PROCEDURE — 250N000009 HC RX 250: Performed by: STUDENT IN AN ORGANIZED HEALTH CARE EDUCATION/TRAINING PROGRAM

## 2021-02-23 PROCEDURE — 710N000012 HC RECOVERY PHASE 2, PER MINUTE: Performed by: STUDENT IN AN ORGANIZED HEALTH CARE EDUCATION/TRAINING PROGRAM

## 2021-02-23 PROCEDURE — 250N000011 HC RX IP 250 OP 636: Performed by: NURSE ANESTHETIST, CERTIFIED REGISTERED

## 2021-02-23 RX ORDER — LIDOCAINE HYDROCHLORIDE 40 MG/ML
SOLUTION TOPICAL PRN
Status: DISCONTINUED | OUTPATIENT
Start: 2021-02-23 | End: 2021-02-23 | Stop reason: HOSPADM

## 2021-02-23 RX ORDER — ONDANSETRON 4 MG/1
4 TABLET, ORALLY DISINTEGRATING ORAL EVERY 30 MIN PRN
Status: DISCONTINUED | OUTPATIENT
Start: 2021-02-23 | End: 2021-02-23 | Stop reason: HOSPADM

## 2021-02-23 RX ORDER — OXYMETAZOLINE HYDROCHLORIDE 0.05 G/100ML
SPRAY NASAL PRN
Status: DISCONTINUED | OUTPATIENT
Start: 2021-02-23 | End: 2021-02-23 | Stop reason: HOSPADM

## 2021-02-23 RX ORDER — PROPOFOL 10 MG/ML
INJECTION, EMULSION INTRAVENOUS PRN
Status: DISCONTINUED | OUTPATIENT
Start: 2021-02-23 | End: 2021-02-23

## 2021-02-23 RX ORDER — OXYCODONE HYDROCHLORIDE 5 MG/1
5 TABLET ORAL EVERY 4 HOURS PRN
Status: DISCONTINUED | OUTPATIENT
Start: 2021-02-23 | End: 2021-02-23 | Stop reason: HOSPADM

## 2021-02-23 RX ORDER — NALOXONE HYDROCHLORIDE 0.4 MG/ML
0.4 INJECTION, SOLUTION INTRAMUSCULAR; INTRAVENOUS; SUBCUTANEOUS
Status: DISCONTINUED | OUTPATIENT
Start: 2021-02-23 | End: 2021-02-23 | Stop reason: HOSPADM

## 2021-02-23 RX ORDER — SODIUM CHLORIDE, SODIUM LACTATE, POTASSIUM CHLORIDE, CALCIUM CHLORIDE 600; 310; 30; 20 MG/100ML; MG/100ML; MG/100ML; MG/100ML
INJECTION, SOLUTION INTRAVENOUS CONTINUOUS
Status: DISCONTINUED | OUTPATIENT
Start: 2021-02-23 | End: 2021-02-23 | Stop reason: HOSPADM

## 2021-02-23 RX ORDER — FENTANYL CITRATE 50 UG/ML
INJECTION, SOLUTION INTRAMUSCULAR; INTRAVENOUS PRN
Status: DISCONTINUED | OUTPATIENT
Start: 2021-02-23 | End: 2021-02-23

## 2021-02-23 RX ORDER — FENTANYL CITRATE 50 UG/ML
25-50 INJECTION, SOLUTION INTRAMUSCULAR; INTRAVENOUS
Status: DISCONTINUED | OUTPATIENT
Start: 2021-02-23 | End: 2021-02-23 | Stop reason: HOSPADM

## 2021-02-23 RX ORDER — HYDROCODONE BITARTRATE AND ACETAMINOPHEN 5; 325 MG/1; MG/1
2 TABLET ORAL
Status: DISCONTINUED | OUTPATIENT
Start: 2021-02-23 | End: 2021-02-23 | Stop reason: HOSPADM

## 2021-02-23 RX ORDER — LIDOCAINE 40 MG/G
CREAM TOPICAL
Status: DISCONTINUED | OUTPATIENT
Start: 2021-02-23 | End: 2021-02-23 | Stop reason: HOSPADM

## 2021-02-23 RX ORDER — DEXAMETHASONE SODIUM PHOSPHATE 4 MG/ML
6 INJECTION, SOLUTION INTRA-ARTICULAR; INTRALESIONAL; INTRAMUSCULAR; INTRAVENOUS; SOFT TISSUE ONCE
Status: COMPLETED | OUTPATIENT
Start: 2021-02-23 | End: 2021-02-23

## 2021-02-23 RX ORDER — ONDANSETRON 2 MG/ML
INJECTION INTRAMUSCULAR; INTRAVENOUS PRN
Status: DISCONTINUED | OUTPATIENT
Start: 2021-02-23 | End: 2021-02-23

## 2021-02-23 RX ORDER — PROPOFOL 10 MG/ML
INJECTION, EMULSION INTRAVENOUS CONTINUOUS PRN
Status: DISCONTINUED | OUTPATIENT
Start: 2021-02-23 | End: 2021-02-23

## 2021-02-23 RX ORDER — NALOXONE HYDROCHLORIDE 0.4 MG/ML
0.2 INJECTION, SOLUTION INTRAMUSCULAR; INTRAVENOUS; SUBCUTANEOUS
Status: DISCONTINUED | OUTPATIENT
Start: 2021-02-23 | End: 2021-02-23 | Stop reason: HOSPADM

## 2021-02-23 RX ORDER — ONDANSETRON 2 MG/ML
4 INJECTION INTRAMUSCULAR; INTRAVENOUS EVERY 30 MIN PRN
Status: DISCONTINUED | OUTPATIENT
Start: 2021-02-23 | End: 2021-02-23 | Stop reason: HOSPADM

## 2021-02-23 RX ORDER — LIDOCAINE HYDROCHLORIDE 20 MG/ML
INJECTION, SOLUTION INFILTRATION; PERINEURAL PRN
Status: DISCONTINUED | OUTPATIENT
Start: 2021-02-23 | End: 2021-02-23

## 2021-02-23 RX ORDER — HYDROMORPHONE HYDROCHLORIDE 1 MG/ML
.3-.5 INJECTION, SOLUTION INTRAMUSCULAR; INTRAVENOUS; SUBCUTANEOUS EVERY 5 MIN PRN
Status: DISCONTINUED | OUTPATIENT
Start: 2021-02-23 | End: 2021-02-23 | Stop reason: HOSPADM

## 2021-02-23 RX ORDER — ACETAMINOPHEN 325 MG/1
650 TABLET ORAL
Status: DISCONTINUED | OUTPATIENT
Start: 2021-02-23 | End: 2021-02-23 | Stop reason: HOSPADM

## 2021-02-23 RX ORDER — ONDANSETRON 4 MG/1
4 TABLET, ORALLY DISINTEGRATING ORAL
Status: DISCONTINUED | OUTPATIENT
Start: 2021-02-23 | End: 2021-02-23 | Stop reason: HOSPADM

## 2021-02-23 RX ADMIN — SUGAMMADEX 200 MG: 100 INJECTION, SOLUTION INTRAVENOUS at 08:24

## 2021-02-23 RX ADMIN — Medication 100 MG: at 08:00

## 2021-02-23 RX ADMIN — ROCURONIUM BROMIDE 30 MG: 10 INJECTION INTRAVENOUS at 08:06

## 2021-02-23 RX ADMIN — MIDAZOLAM 2 MG: 1 INJECTION INTRAMUSCULAR; INTRAVENOUS at 07:47

## 2021-02-23 RX ADMIN — FENTANYL CITRATE 50 MCG: 50 INJECTION, SOLUTION INTRAMUSCULAR; INTRAVENOUS at 08:08

## 2021-02-23 RX ADMIN — SODIUM CHLORIDE, POTASSIUM CHLORIDE, SODIUM LACTATE AND CALCIUM CHLORIDE: 600; 310; 30; 20 INJECTION, SOLUTION INTRAVENOUS at 07:49

## 2021-02-23 RX ADMIN — ONDANSETRON 4 MG: 2 INJECTION INTRAMUSCULAR; INTRAVENOUS at 08:17

## 2021-02-23 RX ADMIN — FENTANYL CITRATE 100 MCG: 50 INJECTION, SOLUTION INTRAMUSCULAR; INTRAVENOUS at 07:59

## 2021-02-23 RX ADMIN — PROPOFOL 150 MCG/KG/MIN: 10 INJECTION, EMULSION INTRAVENOUS at 07:57

## 2021-02-23 RX ADMIN — LIDOCAINE HYDROCHLORIDE 100 MG: 20 INJECTION, SOLUTION INFILTRATION; PERINEURAL at 07:57

## 2021-02-23 RX ADMIN — DEXAMETHASONE SODIUM PHOSPHATE 6 MG: 4 INJECTION, SOLUTION INTRA-ARTICULAR; INTRALESIONAL; INTRAMUSCULAR; INTRAVENOUS; SOFT TISSUE at 08:09

## 2021-02-23 RX ADMIN — PROPOFOL 100 MG: 10 INJECTION, EMULSION INTRAVENOUS at 08:00

## 2021-02-23 ASSESSMENT — COPD QUESTIONNAIRES: COPD: 1

## 2021-02-23 ASSESSMENT — MIFFLIN-ST. JEOR: SCORE: 1769.5

## 2021-02-23 NOTE — OR NURSING
Evert Richardson (uncle) called and reviewed AVS and post-procedure care instructions at 1008. All questions answered.     Pt stable and all belongings returned.

## 2021-02-23 NOTE — BRIEF OP NOTE
[facility] Brief Operative Note    Pre-operative diagnosis: CML (chronic myeloid leukemia) in relapse (H) [C92.12]   Post-operative diagnosis * No post-op diagnosis entered *  Acid reflux   Procedure: Procedure(s):  LARYNGOSCOPY, WITH BIOPSY   Surgeon(s): Surgeon(s) and Role:     * Santo Sigala MD - Primary   Estimated blood loss: * No values recorded between 2/23/2021  8:10 AM and 2/23/2021  8:25 AM *    Specimens: ID Type Source Tests Collected by Time Destination   A : left false vocal cord Tissue Other SURGICAL PATHOLOGY EXAM Santo Sigala MD 2/23/2021  8:17 AM    B : right anterior vocal cord Tissue Other SURGICAL PATHOLOGY EXAM Santo Sigala MD 2/23/2021  8:19 AM       Findings: Signs of acid reflux, biopsies taken       Discharge home when recovers  No Rxs  Activity as tolerated  No voice restrictions  F/u as  Scheduled on 3/1

## 2021-02-23 NOTE — ANESTHESIA PROCEDURE NOTES
Airway   Date/Time: 2/23/2021 8:00 AM   Patient location during procedure: OR  Staff -   Other Anesthesia Staff: Eleazar Sims  Performed By: SRNA    Consent for Airway   Urgency: elective    Indications and Patient Condition  Indications for airway management: mandeep-procedural  Induction type:RSIMask difficulty assessment: 0 - not attempted    Final Airway Details  Final airway type: endotracheal airway  Successful airway:ETT - single  Endotracheal Airway Details   ETT size (mm): 6.0  Cuffed: yes  Successful intubation technique: direct laryngoscopy  Grade View of Cords: 2  Adjucts: stylet  Measured from: gums/teeth  Secured at (cm): 24  Secured with: silk tape    Post intubation assessment   Placement verified by: capnometry, equal breath sounds and chest rise   Number of attempts at approach: 1  Secured with:silk tape  Ease of procedure: easy  Dentition: Intact and Unchanged

## 2021-02-23 NOTE — ANESTHESIA POSTPROCEDURE EVALUATION
Patient: Caesar Richardson    Procedure(s):  LARYNGOSCOPY, WITH BIOPSY    Diagnosis:CML (chronic myeloid leukemia) in relapse (H) [C92.12]  Diagnosis Additional Information: No value filed.    Anesthesia Type:  General    Note:  Disposition: Outpatient   Postop Pain Control: Uneventful            Sign Out: Well controlled pain   PONV: No   Neuro/Psych: Uneventful            Sign Out: Acceptable/Baseline neuro status   Airway/Respiratory: Uneventful            Sign Out: Acceptable/Baseline resp. status   CV/Hemodynamics: Uneventful            Sign Out: Acceptable CV status   Other NRE:    DID A NON-ROUTINE EVENT OCCUR? No         Last vitals:  Vitals:    02/23/21 0615 02/23/21 0632 02/23/21 0843   BP: 120/85  102/46   Pulse:   65   Resp: 20  14   Temp: 36.9  C (98.4  F)  36.4  C (97.5  F)   SpO2:  100%        Last vitals prior to Anesthesia Care Transfer:  CRNA VITALS  2/23/2021 0811 - 2/23/2021 0905      2/23/2021             Resp Rate (observed):  (!) 1          Electronically Signed By: Alie Ramirez MD  February 23, 2021  9:05 AM

## 2021-02-23 NOTE — OP NOTE
Procedure Date: 02/23/2021      SURGEON:  Santo Sigala MD      ASSISTANT:  None.        PREOPERATIVE DIAGNOSES:     1.  Hoarseness.    2.  Vocal cord lesions.      POSTOPERATIVE DIAGNOSES:     1.  Hoarseness.    2.  Vocal cord lesions.      PROCEDURES:   1.  Microdirect laryngoscopy.   2.  Biopsy of vocal cord lesions.      ANESTHESIA:  General.      ESTIMATED BLOOD LOSS:  Minimal.      SPECIMENS:  Vocal cord lesion.      COMPLICATIONS:  None.      OPERATIVE INDICATIONS:  Mr. Richardson is a pleasant, 40-year-old male with a history of CML.  He was referred to me for evaluation of odynophagia and hoarseness.  He has a known history of gastroesophageal reflux disease with a hiatal hernia.  He had a recent EGD that showed signs of esophagitis.  On my flexible exam in the clinic, his larynx appeared to have diffuse change consistent with laryngopharyngeal reflux.  However, given his history, I felt it was prudent to proceed for biopsy of these lesions.  After full discussion of the risks and benefits of the procedure, informed consent was obtained.      OPERATIVE FINDINGS:  No obvious signs of malignancy on exam today.  Photographs were taken.  Biopsies were taken from the left false vocal cord and the right anterior true vocal cord for permanent pathology.      OPERATIVE COURSE:  The patient was brought to the operating room, placed on the operating table supine.  General endotracheal anesthesia was induced.  The patient was prepped and draped in the usual sterile fashion.  A timeout was performed to identify the patient and correctness of the procedure.  Good exposure was obtained with the Dedo laryngoscope.  The 0-degree Miller doreen telescope was used to take photos.  4 mm cup biopsy forceps were used to take biopsies of the left false vocal cord and right anterior true vocal cord.  These were sent for permanent pathology.  Hemostasis was obtained using Afrin.  Topical lidocaine was used for anesthesia on the cords.         The patient tolerated the procedure well.  He was subsequently turned over to Anesthesia, was awakened, extubated, and transferred to recovery room in stable condition.         HUGO FUENTES MD             D: 2021   T: 2021   MT: RUBI      Name:     GRACIE CORONEL   MRN:      [id number]        Account:        [id number]   :      1980           Procedure Date: 2021      Document: [id number]

## 2021-02-23 NOTE — ANESTHESIA PREPROCEDURE EVALUATION
Anesthesia Pre-Procedure Evaluation    Patient: Caesra Richardson   MRN: [id number] : 1980        Preoperative Diagnosis: CML (chronic myeloid leukemia) in relapse (H) [C92.12]   Procedure : Procedure(s):  LARYNGOSCOPY, WITH BIOPSY     Past Medical History:   Diagnosis Date     Arthritis      BMT (bone marrow transplant)      CML (chronic myelocytic leukemia) (H)      COPD (chronic obstructive pulmonary disease) (H) 2013     GERD (gastroesophageal reflux disease)      Qtbut-tvolkj-agkq disease (H) 2011     Problem list name updated by automated process. Provider to review     Rheumatoid arthritis (H) 2011     Problem list name updated by automated process. Provider to review      Past Surgical History:   Procedure Laterality Date     ARTHROPLASTY KNEE UNICOMPARTMENT BILATERAL Bilateral 3/1/2018    Procedure: ARTHROPLASTY KNEE UNICOMPARTMENT BILATERAL;  Bilateral Knee Uni Compartmental Arthroplasty;  Surgeon: Pradeep Turner MD;  Location: [facility] OR     BMT CELL PRODUCT INFUSION       ESOPHAGOSCOPY, GASTROSCOPY, DUODENOSCOPY (EGD), COMBINED N/A 2021    Procedure: ESOPHAGOGASTRODUODENOSCOPY, WITH BIOPSY;  Surgeon: Meño Murphy MD;  Location: [facility] OR     ORTHOPEDIC SURGERY       TRANSPLANT      BMT      No Known Allergies   Social History     Tobacco Use     Smoking status: Former Smoker     Packs/day: 1.00     Years: 25.00     Pack years: 25.00     Types: Cigarettes     Quit date: 2019     Years since quittin.1     Smokeless tobacco: Never Used     Tobacco comment: down to few cigs per week   Substance Use Topics     Alcohol use: No     Alcohol/week: 1.7 standard drinks      Wt Readings from Last 1 Encounters:   21 87.7 kg (193 lb 5.5 oz)        Anesthesia Evaluation   Pt has had prior anesthetic. Type: General.    No history of anesthetic complications       ROS/MED HX  ENT/Pulmonary: Comment: Current smoker    (+) COPD,     Neurologic:        Cardiovascular:       METS/Exercise Tolerance: >4 METS    Hematologic:       Musculoskeletal:       GI/Hepatic:     (+) GERD, Symptomatic,     Renal/Genitourinary:       Endo:       Psychiatric/Substance Use:       Infectious Disease:       Malignancy:       Other:            Physical Exam    Airway        Mallampati: I   TM distance: > 3 FB   Neck ROM: full   Mouth opening: > 3 cm    Respiratory Devices and Support         Dental       (+) upper dentures      Cardiovascular             Pulmonary                   OUTSIDE LABS:  CBC:   Lab Results   Component Value Date    WBC 6.0 03/11/2020    WBC 6.3 09/19/2019    HGB 12.9 (L) 03/11/2020    HGB 13.2 (L) 09/19/2019    HCT 39.3 (L) 03/11/2020    HCT 39.4 (L) 09/19/2019     03/11/2020     09/19/2019     BMP:   Lab Results   Component Value Date     03/11/2020     09/19/2019    POTASSIUM 4.4 03/11/2020    POTASSIUM 4.3 09/19/2019    CHLORIDE 107 03/11/2020    CHLORIDE 113 (H) 09/19/2019    CO2 26 03/11/2020    CO2 22 09/19/2019    BUN 12 03/11/2020    BUN 20 09/19/2019    CR 1.06 03/11/2020    CR 1.10 09/19/2019     (H) 03/11/2020     (H) 09/19/2019     COAGS:   Lab Results   Component Value Date    PTT 25 03/07/2014    INR 0.93 03/04/2018    FIBR 354 06/28/2006     POC:   Lab Results   Component Value Date     (H) 03/01/2018     HEPATIC:   Lab Results   Component Value Date    ALBUMIN 3.5 03/11/2020    PROTTOTAL 6.5 (L) 03/11/2020    ALT 89 (H) 03/11/2020    AST 48 (H) 03/11/2020    ALKPHOS 125 03/11/2020    BILITOTAL 0.3 03/11/2020     OTHER:   Lab Results   Component Value Date    LACT 1.1 07/28/2012    LAKE 8.7 03/11/2020    PHOS 4.1 03/10/2014    MAG 2.0 03/12/2014    LIPASE 47 07/26/2013    AMYLASE 113 (H) 07/26/2013    TSH 1.77 10/25/2017    CRP 57.9 (H) 03/04/2018    SED 17 (H) 03/04/2018       Anesthesia Plan    ASA Status:  3   NPO Status:  NPO Appropriate    Anesthesia Type: General.     - Airway: ETT    Induction: RSI.   Maintenance: Balanced.        Consents    Anesthesia Plan(s) and associated risks, benefits, and realistic alternatives discussed. Questions answered and patient/representative(s) expressed understanding.     - Discussed with:  Patient      - Extended Intubation/Ventilatory Support Discussed: no Extended Intubation.      - Patient is DNR/DNI Status: No    Use of blood products discussed: No .     Postoperative Care    Pain management: IV analgesics.   PONV prophylaxis: Ondansetron (or other 5HT-3), Dexamethasone or Solumedrol     Comments:    Patient endorses >4 MET's states with his COPD he has occasional shortness of breath but this is stable and he continues to smoke cigarettes, he has burning pain in his epigastrum which he feels is related to his hiatal hernia and is not correlated with exertion.            Alie Ramirez MD

## 2021-02-23 NOTE — DISCHARGE INSTRUCTIONS
[facility]  Same-Day Surgery   Adult Discharge Orders & Instructions     For 24 hours after surgery    1. Get plenty of rest.  A responsible adult must stay with you for at least 24 hours after you leave the hospital.   2. Do not drive or use heavy equipment.  If you have weakness or tingling, don't drive or use heavy equipment until this feeling goes away.  3. Do not drink alcohol.  4. Avoid strenuous or risky activities.  Ask for help when climbing stairs.   5. You may feel lightheaded.  IF so, sit for a few minutes before standing.  Have someone help you get up.   6. If you have nausea (feel sick to your stomach): Drink only clear liquids such as apple juice, ginger ale, broth or 7-Up.  Rest may also help.  Be sure to drink enough fluids.  Move to a regular diet as you feel able.  7. You may have a slight fever. Call the doctor if your fever is over 100 F (37.7 C) (taken under the tongue) or lasts longer than 24 hours.  8. You may have a dry mouth, a sore throat, muscle aches or trouble sleeping.  These should go away after 24 hours.  9. Do not make important or legal decisions.   Call your doctor for any of the followin.  Signs of infection (fever, growing tenderness at the surgery site, a large amount of drainage or bleeding, severe pain, foul-smelling drainage, redness, swelling).    2. It has been over 8 to 10 hours since surgery and you are still not able to urinate (pass water).    3.  Headache for over 24 hours.    4.  Numbness, tingling or weakness the day after surgery (if you had spinal anesthesia).  To contact a doctor, call ___Dr. Sigala [phone] ___________________________________ or:        [phone] and ask for the resident on call for   ______________________________________________ (answered 24 hours a day)      Emergency Department:    [facility]: [phone]       (TTY for hearing impaired: [phone])    [facility]:  [phone]       (TTY for hearing impaired: [phone])

## 2021-02-23 NOTE — ANESTHESIA CARE TRANSFER NOTE
Patient: Caesar Richardson    Procedure(s):  LARYNGOSCOPY, WITH BIOPSY    Diagnosis: CML (chronic myeloid leukemia) in relapse (H) [C92.12]  Diagnosis Additional Information: No value filed.    Anesthesia Type:   No value filed.     Note:    Oropharynx: oropharynx clear of all foreign objects  Level of Consciousness: drowsy  Oxygen Supplementation: nasal cannula  Level of Supplemental Oxygen (L/min / FiO2): 3  Independent Airway: airway patency satisfactory and stable  Dentition: dentition unchanged  Vital Signs Stable: post-procedure vital signs reviewed and stable  Report to RN Given: handoff report given  Patient transferred to: PACU    Handoff Report: Identifed the Patient, Identified the Reponsible Provider, Reviewed the pertinent medical history, Discussed the surgical course, Reviewed Intra-OP anesthesia mangement and issues during anesthesia, Set expectations for post-procedure period and Allowed opportunity for questions and acknowledgement of understanding      Vitals: (Last set prior to Anesthesia Care Transfer)  CRNA VITALS  2/23/2021 0811 - 2/23/2021 0844      2/23/2021             Resp Rate (observed):  (!) 1        Electronically Signed By: JASON Gipson CRNA  February 23, 2021  8:44 AM

## 2021-02-24 ENCOUNTER — TELEPHONE (OUTPATIENT)
Dept: OTOLARYNGOLOGY | Facility: CLINIC | Age: 41
End: 2021-02-24

## 2021-02-24 NOTE — TELEPHONE ENCOUNTER
I called and spoke to Mr Richardson about his path results. Biopsies consistent with inflammation/granulation. GMS pending. No dysplasia or malignancy. This was anticipated.     He can follow up with me as needed.    Santo Sigala MD

## 2021-02-25 LAB — COPATH REPORT: NORMAL

## 2021-03-02 DIAGNOSIS — C92.10 CHRONIC MYELOID LEUKEMIA (H): ICD-10-CM

## 2021-03-02 DIAGNOSIS — N52.9 ERECTILE DYSFUNCTION, UNSPECIFIED ERECTILE DYSFUNCTION TYPE: ICD-10-CM

## 2021-03-02 RX ORDER — TADALAFIL 10 MG/1
10 TABLET ORAL DAILY PRN
Qty: 10 TABLET | Refills: 11 | Status: SHIPPED | OUTPATIENT
Start: 2021-03-02 | End: 2021-07-26

## 2021-03-09 NOTE — TELEPHONE ENCOUNTER
REFERRAL INFORMATION:    Referring Provider:  N/A    Referring Clinic:  N/A    Reason for Visit/Diagnosis: Reflux     FUTURE VISIT INFORMATION:    Appointment Date: 3/15/2021    Appointment Time: 3 PM      NOTES STATUS DETAILS   OFFICE NOTE from Referring Provider N/A    OFFICE NOTE from Other Specialist N/A    HOSPITAL DISCHARGE SUMMARY/  ED VISITS N/A    OPERATIVE REPORT N/A    MEDICATION LIST Internal         ENDOSCOPY  Internal EGD: 1/25/2021, 8/24/06, 8/210/06   COLONOSCOPY N/A    ERCP N/A    EUS N/A    STOOL TESTING Internal 3/11/14   PERTINENT LABS Internal    PATHOLOGY REPORTS (RELATED) Internal 1/25/2021   IMAGING (CT, MRI, EGD, MRCP, Small Bowel Follow Through/SBT, MR/CT Enterography) N/A

## 2021-03-15 ENCOUNTER — PRE VISIT (OUTPATIENT)
Dept: GASTROENTEROLOGY | Facility: CLINIC | Age: 41
End: 2021-03-15

## 2021-03-15 ENCOUNTER — VIRTUAL VISIT (OUTPATIENT)
Dept: GASTROENTEROLOGY | Facility: CLINIC | Age: 41
End: 2021-03-15
Payer: COMMERCIAL

## 2021-03-15 VITALS — BODY MASS INDEX: 28.14 KG/M2 | WEIGHT: 190 LBS | HEIGHT: 69 IN

## 2021-03-15 DIAGNOSIS — K21.01 GASTROESOPHAGEAL REFLUX DISEASE WITH ESOPHAGITIS AND HEMORRHAGE: Primary | ICD-10-CM

## 2021-03-15 PROCEDURE — 99443 PR PHYSICIAN TELEPHONE EVALUATION 21-30 MIN: CPT | Mod: 95 | Performed by: INTERNAL MEDICINE

## 2021-03-15 ASSESSMENT — MIFFLIN-ST. JEOR: SCORE: 1754.27

## 2021-03-15 NOTE — NURSING NOTE
"Chief Complaint   Patient presents with     New Patient     new consult       Vitals:    03/15/21 1446   Weight: 86.2 kg (190 lb)   Height: 1.74 m (5' 8.5\")       Body mass index is 28.47 kg/m .    Lynsey Sifuentes CMA    "

## 2021-03-15 NOTE — LETTER
3/15/2021         RE: Caesar Richardson  [address] [address]        Dear Colleague,    Thank you for referring your patient, Caesar Richardson, to the [facility] [place]. Please see a copy of my visit note below.    Caesar Richardson is a 40 year old male who is being evaluated via a telephone visit.       Total time of call between patient and provider was 21 minutes     Sascha Gunter MD      Gastroenterology Progress Note  [facility]         Reason for Follow Up:   GERD, Esophagitis.              ASSESSMENT AND RECOMMENDATIONS:   Assessment:  40 year old male with a sp remote history BMT x 2 for CML,  history of LPR very symptomatic, LA Grade C esophagitis, on omeprazole 40 mg.       Recommendations:  Have him get a HRM, just prior to his repeat EGD with Dr. Murphy.  He wants a fundoplication.     After that we will review           History of Present Illness:   Caesar Richardson is a 40 year old male sp BMT x 2 for CML,   Past Medical History:   Diagnosis Date     Arthritis      BMT (bone marrow transplant) 2006     CML (chronic myelocytic leukemia) (H)      COPD (chronic obstructive pulmonary disease) (H) 1/9/2013     GERD (gastroesophageal reflux disease)      Uyxte-jctuez-ditp disease (H) 7/21/2011     Problem list name updated by automated process. Provider to review     Rheumatoid arthritis (H) 7/21/2011     Problem list name updated by automated process. Provider to review     He is seeing me after evaluation with   ENT voice horsness and severe sore throat, ENT saw some nodules took him to OR to biopsy were benign.  Told him he had the worst case of LPR they have ever seen.  He has been on omeprazole 40 mg daily since 06'.  Also had EGD recently    EGD 1/25/2021    Findings:        The Z-line was irregular and was found 35 cm from the incisors.        LA Grade C (one or more mucosal breaks continuous between tops of 2 or        more mucosal folds, less than 75%  circumference) esophagitis with no        bleeding was found at the gastroesophageal junction. Likely reflux, but        biopsies were taken with a cold forceps for histology (evaluate for        reflux, CMV, HSV and GVHD).        The exam of the esophagus was otherwise normal. Biopsies taken of the        mid and distal esophagus and placed in separate jars.        Diffuse moderately erythematous mucosa without bleeding was found in the        entire examined stomach. Biopsies were taken with a cold forceps for        histology.        A 1 cm hiatal hernia was present.        The exam of the stomach was otherwise normal.        The examined duodenum was normal. Biopsies were taken with a cold        forceps for histology.     FINAL DIAGNOSIS:   A. Duodenum, Biopsy:   Duodenal mucosa with preserved villi and no significant histologic   abnormality; features celiac sprue, GVHD,   or peptic duodenitis are not identified     B. Stomach, Biopsy:   Gastric antral & fundic mucosa with no significant histologic abnormality;    negative for GVHD, intestinal   metaplasia or dysplasia; no H. pylori-like organisms identified on routine    staining     C. Lower Esophagus, Biopsy:   Squamocolumnar junction mucosa with basal cell hyperplasia, acute   inflammation (peak intraepithelial   eosinophils of 3 per HPF) and reactive changes; negative for intestinal   metaplasia or dysplasia; reports of   PAS (for fungi), CMV, & HSV to follow     D. Middle Esophagus, Biopsy:   Squamous mucosa with basal cell hyperplasia, acute inflammation (peak   intraepithelial eosinophils of 7 per   HPF) and reactive changes; negative for intestinal metaplasia or   dysplasia; reports of PAS (for fungi), CMV,   & HSV to follow     COMMENT:   Features seen in the esophagus could be from chemical esophagitis (eg from    reflux, medications, or other) or   an infection, and less likely (though not absolutely ruled out)   eosinophilic esophagitis. Report of  stains for   microorganisms will follow.    Hoarse voice constantly.  Has a very sore  throat, in the bottom of the throat a little hole in the middle.    Sometimes at times it hurts to breathe.  Has had bad heartburn times 13 years.  He as been on omeprazole 40 mg since 2006.      Started back in July with covid had sore throat.  Thought he had strep and was positive, acid reflux was terrible.   He thought he was getting sick. Getting throat testing.      Gets severe pyrosis every other day.  Has regurgitation at night frequently, has coffee ground type regurgitation off and on.   Not much dysphagia.       He is overweight and smokes- trying to quit.  Has asthma     2/23/21  ENT Op Note   OPERATIVE INDICATIONS:  Mr. Richardson is a pleasant, 40-year-old male with a history of CML.  He was referred to me for evaluation of odynophagia and hoarseness.  He has a known history of gastroesophageal reflux disease with a hiatal hernia.  He had a recent EGD that showed signs of esophagitis.  On my flexible exam in the clinic, his larynx appeared to have diffuse change consistent with laryngopharyngeal reflux.  However, given his history, I felt it was prudent to proceed for biopsy of these lesions.  After full discussion of the risks and benefits of the procedure, informed consent was obtained.      OPERATIVE FINDINGS:  No obvious signs of malignancy on exam today.  Photographs were taken.  Biopsies were taken from the left false vocal cord and the right anterior true vocal cord for permanent pathology.    2/23/21  FINAL DIAGNOSIS:   A. FALSE VOCAL CORD, LEFT, BIOPSY:   - Fragments of hyperplastic squamous mucosa with acute inflammation and   granulation tissue.   - GMS stain is pending and the results will be reported in addendum.   - Negative for dysplasia or malignancy.     B. VOCAL CORD, RIGHT, ANTERIOR, BIOPSY:   - Fragments of squamous mucosa with granulation tissue, necrosis and acute    inflammation, consistent with an  "  ulcer bed   - GMS stain is pending and the results will be reported in addendum.   - Negative for dysplasia or malignancy.                                   Past Medical and Surgical History, Social History, Family History - per Epic EMR: REVIEWED         Allergies:   Reviewed and edited as appropriate   No Known Allergies         Medications:     Current Outpatient Medications   Medication Sig Dispense Refill     diclofenac (VOLTAREN) 1 % GEL Apply topically 4 times daily 2 Tube 6     fluticasone-salmeterol (ADVAIR) 250-50 MCG/DOSE diskus inhaler Inhale 1 puff into the lungs 2 times daily 1 Inhaler 12     ibuprofen (ADVIL/MOTRIN) 200 MG tablet Take 200 mg by mouth every 4 hours as needed for mild pain       nicotine (NICODERM CQ) 21 MG/24HR 24 hr patch Place 1 patch onto the skin every 24 hours       omeprazole (PRILOSEC) 40 MG DR capsule Take 1 capsule (40 mg) by mouth 2 times daily (before meals) 60 capsule 3     tadalafil (CIALIS) 10 MG tablet Take 1 tablet (10 mg) by mouth daily as needed (take 1 tablet 30 min prior to sexual intercourse) 10 tablet 11     traZODone (DESYREL) 150 MG tablet Take 1 tablet (150 mg) by mouth At Bedtime 90 tablet 6             Review of Systems:     Per HPI           Physical Exam:   Ht 1.74 m (5' 8.5\")   Wt 86.2 kg (190 lb)   BMI 28.47 kg/m    Wt:   Wt Readings from Last 2 Encounters:   03/15/21 86.2 kg (190 lb)   02/23/21 87.7 kg (193 lb 5.5 oz)      Telephone consult     Sascha Gunter MD  Associate Professor of Medicine  Division of Gastroenterology, Hepatology, and Nutrition  [facility]        "

## 2021-03-15 NOTE — PROGRESS NOTES
Caesar Richardson is a 40 year old male who is being evaluated via a telephone visit.         Total time of call between patient and provider was 21 minutes          Sascha Gunter MD            Gastroenterology Progress Note  [facility]             Reason for Follow Up:   GERD, Esophagitis.              ASSESSMENT AND RECOMMENDATIONS:   Assessment:  40 year old male with a sp remote history BMT x 2 for CML,  history of LPR very symptomatic, LA Grade C esophagitis, on omeprazole 40 mg.       Recommendations:  Have him get a HRM, just prior to his repeat EGD with Dr. Murphy.  He wants a fundoplication.     After that we will review               History of Present Illness:   Caesar Richardson is a 40 year old male sp BMT x 2 for CML,   Past Medical History:   Diagnosis Date     Arthritis      BMT (bone marrow transplant) 2006     CML (chronic myelocytic leukemia) (H)      COPD (chronic obstructive pulmonary disease) (H) 1/9/2013     GERD (gastroesophageal reflux disease)      Owrjz-zkghbe-ztul disease (H) 7/21/2011     Problem list name updated by automated process. Provider to review     Rheumatoid arthritis (H) 7/21/2011     Problem list name updated by automated process. Provider to review     He is seeing me after evaluation with   ENT voice horsness and severe sore throat, ENT saw some nodules took him to OR to biopsy were benign.  Told him he had the worst case of LPR they have ever seen.  He has been on omeprazole 40 mg daily since 06'.  Also had EGD recently    EGD 1/25/2021    Findings:        The Z-line was irregular and was found 35 cm from the incisors.        LA Grade C (one or more mucosal breaks continuous between tops of 2 or        more mucosal folds, less than 75% circumference) esophagitis with no        bleeding was found at the gastroesophageal junction. Likely reflux, but        biopsies were taken with a cold forceps for histology (evaluate for        reflux, CMV, HSV and GVHD).        The exam of  the esophagus was otherwise normal. Biopsies taken of the        mid and distal esophagus and placed in separate jars.        Diffuse moderately erythematous mucosa without bleeding was found in the        entire examined stomach. Biopsies were taken with a cold forceps for        histology.        A 1 cm hiatal hernia was present.        The exam of the stomach was otherwise normal.        The examined duodenum was normal. Biopsies were taken with a cold        forceps for histology.     FINAL DIAGNOSIS:   A. Duodenum, Biopsy:   Duodenal mucosa with preserved villi and no significant histologic   abnormality; features celiac sprue, GVHD,   or peptic duodenitis are not identified     B. Stomach, Biopsy:   Gastric antral & fundic mucosa with no significant histologic abnormality;    negative for GVHD, intestinal   metaplasia or dysplasia; no H. pylori-like organisms identified on routine    staining     C. Lower Esophagus, Biopsy:   Squamocolumnar junction mucosa with basal cell hyperplasia, acute   inflammation (peak intraepithelial   eosinophils of 3 per HPF) and reactive changes; negative for intestinal   metaplasia or dysplasia; reports of   PAS (for fungi), CMV, & HSV to follow     D. Middle Esophagus, Biopsy:   Squamous mucosa with basal cell hyperplasia, acute inflammation (peak   intraepithelial eosinophils of 7 per   HPF) and reactive changes; negative for intestinal metaplasia or   dysplasia; reports of PAS (for fungi), CMV,   & HSV to follow     COMMENT:   Features seen in the esophagus could be from chemical esophagitis (eg from    reflux, medications, or other) or   an infection, and less likely (though not absolutely ruled out)   eosinophilic esophagitis. Report of stains for   microorganisms will follow.          Hoarse voice constantly.  Has a very sore  throat, in the bottom of the throat a little hole in the middle.    Sometimes at times it hurts to breathe.  Has had bad heartburn times 13 years.   He as been on omeprazole 40 mg since 2006.      Started back in July with covid had sore throat.  Thought he had strep and was positive, acid reflux was terrible.   He thought he was getting sick. Getting throat testing.      Gets severe pyrosis every other day.  Has regurgitation at night frequently, has coffee ground type regurgitation off and on.   Not much dysphagia.       He is overweight and smokes- trying to quit.  Has asthma         2/23/21  ENT Op Note   OPERATIVE INDICATIONS:  Mr. Richardson is a pleasant, 40-year-old male with a history of CML.  He was referred to me for evaluation of odynophagia and hoarseness.  He has a known history of gastroesophageal reflux disease with a hiatal hernia.  He had a recent EGD that showed signs of esophagitis.  On my flexible exam in the clinic, his larynx appeared to have diffuse change consistent with laryngopharyngeal reflux.  However, given his history, I felt it was prudent to proceed for biopsy of these lesions.  After full discussion of the risks and benefits of the procedure, informed consent was obtained.      OPERATIVE FINDINGS:  No obvious signs of malignancy on exam today.  Photographs were taken.  Biopsies were taken from the left false vocal cord and the right anterior true vocal cord for permanent pathology.    2/23/21  FINAL DIAGNOSIS:   A. FALSE VOCAL CORD, LEFT, BIOPSY:   - Fragments of hyperplastic squamous mucosa with acute inflammation and   granulation tissue.   - GMS stain is pending and the results will be reported in addendum.   - Negative for dysplasia or malignancy.     B. VOCAL CORD, RIGHT, ANTERIOR, BIOPSY:   - Fragments of squamous mucosa with granulation tissue, necrosis and acute    inflammation, consistent with an   ulcer bed   - GMS stain is pending and the results will be reported in addendum.   - Negative for dysplasia or malignancy.                                   Past Medical and Surgical History, Social History, Family History - per  "Epic EMR: REVIEWED         Allergies:   Reviewed and edited as appropriate   No Known Allergies         Medications:     Current Outpatient Medications   Medication Sig Dispense Refill     diclofenac (VOLTAREN) 1 % GEL Apply topically 4 times daily 2 Tube 6     fluticasone-salmeterol (ADVAIR) 250-50 MCG/DOSE diskus inhaler Inhale 1 puff into the lungs 2 times daily 1 Inhaler 12     ibuprofen (ADVIL/MOTRIN) 200 MG tablet Take 200 mg by mouth every 4 hours as needed for mild pain       nicotine (NICODERM CQ) 21 MG/24HR 24 hr patch Place 1 patch onto the skin every 24 hours       omeprazole (PRILOSEC) 40 MG DR capsule Take 1 capsule (40 mg) by mouth 2 times daily (before meals) 60 capsule 3     tadalafil (CIALIS) 10 MG tablet Take 1 tablet (10 mg) by mouth daily as needed (take 1 tablet 30 min prior to sexual intercourse) 10 tablet 11     traZODone (DESYREL) 150 MG tablet Take 1 tablet (150 mg) by mouth At Bedtime 90 tablet 6             Review of Systems:     Per HPI           Physical Exam:   Ht 1.74 m (5' 8.5\")   Wt 86.2 kg (190 lb)   BMI 28.47 kg/m    Wt:   Wt Readings from Last 2 Encounters:   03/15/21 86.2 kg (190 lb)   02/23/21 87.7 kg (193 lb 5.5 oz)      Telephone consult     Sascha Gunter MD  Associate Professor of Medicine  Division of Gastroenterology, Hepatology, and Nutrition  [facility]        "

## 2021-03-22 ENCOUNTER — TELEPHONE (OUTPATIENT)
Dept: GASTROENTEROLOGY | Facility: CLINIC | Age: 41
End: 2021-03-22

## 2021-03-22 DIAGNOSIS — Z11.59 ENCOUNTER FOR SCREENING FOR OTHER VIRAL DISEASES: ICD-10-CM

## 2021-03-22 NOTE — TELEPHONE ENCOUNTER
Caller Nursing staff from [facility]    Reason for cancel? Pt. Was having significant issues and was in town.    Rescheduled? Yes to 3/26/2021. Covid test moved to 3/23/2021 @8:30am and instructions were sent via email.     Will patient call back to reschedule?

## 2021-03-23 DIAGNOSIS — Z11.59 ENCOUNTER FOR SCREENING FOR OTHER VIRAL DISEASES: ICD-10-CM

## 2021-03-23 PROCEDURE — U0005 INFEC AGEN DETEC AMPLI PROBE: HCPCS | Performed by: INTERNAL MEDICINE

## 2021-03-23 PROCEDURE — U0003 INFECTIOUS AGENT DETECTION BY NUCLEIC ACID (DNA OR RNA); SEVERE ACUTE RESPIRATORY SYNDROME CORONAVIRUS 2 (SARS-COV-2) (CORONAVIRUS DISEASE [COVID-19]), AMPLIFIED PROBE TECHNIQUE, MAKING USE OF HIGH THROUGHPUT TECHNOLOGIES AS DESCRIBED BY CMS-2020-01-R: HCPCS | Performed by: INTERNAL MEDICINE

## 2021-03-23 NOTE — MR AVS SNAPSHOT
After Visit Summary   7/26/2017    Caesar Richardson    MRN: [id number]           Patient Information     Date Of Birth          1980        Visit Information        Provider Department      7/26/2017 2:30 PM Sebas Leon MD [facility] Blood and Marrow Transplant        Today's Diagnoses     Status post allogeneic bone marrow transplant (H)        CML (chronic myelocytic leukemia) (H)        Tnhhb-sfjtbq-uacn disease, unspecified (H)              [facility] [place] [place]  [address] [place] [address]  Phone: [phone]  Clinic Hours:   Monday-Thursday:7am to 7pm   Friday: 7am to 5pm   Weekends and holidays:    8am to noon (in general)  If your fever is 100.5  or greater,   call the clinic.  After hours call the   hospital at [phone] or   [phone]. Ask for the BMT   fellow on-call           Care Instructions    RTC in  10/25 with labs; sooner if needed.  Continue to work on diet to loose weight and exercise.   We would hope he could loose weight 10-15 LBs until next visit  Stop smoking  Fast for next visit to measure lipid   He will call for knee appoitntment          Follow-ups after your visit        Your next 10 appointments already scheduled     Oct 25, 2017 10:00 AM CDT   [facility] with  [facility]   [facility] ([facility])    [address] [place] [id number] [phone]            Oct 25, 2017 10:30 AM CDT   Return with Sebas Leon MD   [facility] Blood and Marrow Transplant ([facility])    [address] [place] [id number] [phone]              Who to contact     If you have questions or need follow up information about today's clinic visit or your schedule please contact [facility] BLOOD AND MARROW TRANSPLANT directly at [phone].  Normal or non-critical lab and imaging results will be  communicated to you by [employer], letter or phone within 4 business days after the clinic has received the results. If you do not hear from us within 7 days, please contact the clinic through compropago or phone. If you have a critical or abnormal lab result, we will notify you by phone as soon as possible.  Submit refill requests through compropago or call your pharmacy and they will forward the refill request to us. Please allow 3 business days for your refill to be completed.          Additional Information About Your Visit        compropago Information     compropago gives you secure access to your electronic health record. If you see a primary care provider, you can also send messages to your care team and make appointments. If you have questions, please call your primary care clinic.  If you do not have a primary care provider, please call [phone] and they will assist you.        Care EveryWhere ID     This is your Care EveryWhere ID. This could be used by other organizations to access your [place] medical records  [id number]        Your Vitals Were     Pulse Temperature Respirations Pulse Oximetry BMI (Body Mass Index)       96 98.4  F (36.9  C) (Oral) 16 99% 32.81 kg/m2        Blood Pressure from Last 3 Encounters:   07/26/17 140/88   04/12/17 130/78   01/20/17 141/77    Weight from Last 3 Encounters:   07/26/17 95 kg (209 lb 8 oz)   04/12/17 97.5 kg (214 lb 14.4 oz)   01/20/17 96.2 kg (212 lb 1.6 oz)              We Performed the Following     BCR ABL1 Major Breakpoint Quant p210     CBC with platelets differential     Comprehensive metabolic panel     LDL cholesterol direct     Lipid panel reflex to direct LDL        Recent Review Flowsheet Data     BMT Recent Results Latest Ref Rng & Units 12/9/2015 3/9/2016 6/8/2016 9/14/2016 1/20/2017 4/12/2017 7/26/2017    WBC 4.0 - 11.0 10e9/L 7.4 8.5 9.1 8.8 8.6 8.7 9.1    Hemoglobin 13.3 - 17.7 g/dL 13.2(L) 14.9 14.0 14.5 14.9 14.2 14.9    Platelet Count 150 - 450 10e9/L  200 240 219 225 188 209 218    Neutrophils (Absolute) 1.6 - 8.3 10e9/L 3.9 4.4 4.8 4.7 4.3 4.2 5.1    INR 0.86 - 1.14 - - - - - - -    Sodium 133 - 144 mmol/L 143 139 139 140 139 138 139    Potassium 3.4 - 5.3 mmol/L 3.5 4.4 3.8 4.2 4.3 4.3 4.1    Chloride 94 - 109 mmol/L 110(H) 106 109 109 108 108 107    Glucose 70 - 99 mg/dL 76 83 81 88 109(H) 82 83    Urea Nitrogen 7 - 30 mg/dL 14 13 17 15 12 17 16    Creatinine 0.66 - 1.25 mg/dL 0.93 1.00 1.00 1.13 0.98 1.13 1.24    Calcium (Total) 8.5 - 10.1 mg/dL 8.1(L) 9.0 9.1 9.2 8.9 8.8 9.4    Protein (Total) 6.8 - 8.8 g/dL 6.4(L) 7.5 7.5 7.6 7.1 7.2 7.5    Albumin 3.4 - 5.0 g/dL 3.5 3.8 4.0 4.0 3.8 3.9 3.8    Alkaline Phosphatase 40 - 150 U/L 169(H) 174(H) 151(H) 181(H) 156(H) 140 170(H)    AST 0 - 45 U/L 43 82(H) 49(H) 55(H) 52(H) 56(H) 58(H)    ALT 0 - 70 U/L 90(H) 178(H) 94(H) 93(H) 93(H) 104(H) 114(H)    MCV 78 - 100 fl 89 89 89 89 90 90 91               Primary Care Provider    None Specified       No primary provider on file.        Equal Access to Services     ADALID CLEMENS : Kati Hui, jonathan nam, kaity powell, rosalee gutierrez [facility] [phone].    ATENCIÓN: Si habla español, tiene a st disposición servicios gratuitos de asistencia lingüística. Zakia reyes [phone].    We comply with applicable federal civil rights laws and [place] laws. We do not discriminate on the basis of race, color, national origin, age, disability sex, sexual orientation or gender identity.            Thank you!     Thank you for choosing [facility]ARR[facility] TRANSPL[facility]  for your care. Our goal is always to provide you with excellent care. Hearing back from our patients is one way we can continue to improve our services. Please take a few minutes to complete the written survey that you may receive in the mail after your visit with us. Thank you!             Your Updated Medication List - Protect others around you:  Learn how to safely use, store and throw away your medicines at www.disposemymeds.org.          This list is accurate as of: 7/26/17  3:12 PM.  Always use your most recent med list.                   Brand Name Dispense Instructions for use Diagnosis    clindamycin 1 % lotion    CLEOCIN-T    60 mL    Apply topically 2 times daily To forehead and affected areas    Status post allogeneic bone marrow transplant (H), CML (chronic myelocytic leukemia) (H), Oyycr-qhtilq-bnyc disease, unspecified (H), Panlobular emphysema (H), Anxiety, Cough, Diminished libido, Erectile dysfunction, unspecified erectile dysfunction type, Heartburn       diclofenac 1 % Gel topical gel    VOLTAREN    2 Tube    Apply topically 4 times daily    CML (chronic myelocytic leukemia) (H), Panlobular emphysema (H), Anxiety, Cough, Status post allogeneic bone marrow transplant (H), Oqsxg-jknptu-zhzw disease, unspecified (H), Diminished libido, Erectile dysfunction, unspecified erectile dysfunction type, Heartburn       fluticasone-salmeterol 250-50 MCG/DOSE diskus inhaler    ADVAIR    1 Inhaler    Inhale 1 puff into the lungs 2 times daily    Panlobular emphysema (H), CML (chronic myelocytic leukemia) (H), Anxiety, Cough, Status post allogeneic bone marrow transplant (H), Gnwdq-smngqm-ssjw disease, unspecified (H), Diminished libido, Erectile dysfunction, unspecified erectile dysfunction type, Heartburn       LORazepam 1 MG tablet    ATIVAN    60 tablet    Take 1 tablet (1 mg) by mouth every 4 hours as needed for other (nausea, vomiting, anxiety)    CML (chronic myelocytic leukemia) (H), Anxiety, Panlobular emphysema (H), Cough, Status post allogeneic bone marrow transplant (H), Bjsge-xogsoa-aglq disease, unspecified (H), Diminished libido, Erectile dysfunction, unspecified erectile dysfunction type, Heartburn       omeprazole 40 MG capsule    priLOSEC    30 capsule    Take 1 capsule (40 mg) by mouth daily    Status post allogeneic bone marrow  transplant (H), CML (chronic myelocytic leukemia) (H), Yabhf-gorspm-svam disease, unspecified (H), Diminished libido, Erectile dysfunction, unspecified erectile dysfunction type, Panlobular emphysema (H), Anxiety, Cough, Heartburn       tiotropium 18 MCG capsule    SPIRIVA HANDIHALER    30 capsule    Inhale contents of one capsule daily.    Panlobular emphysema (H), CML (chronic myelocytic leukemia) (H), Anxiety, Cough, Status post allogeneic bone marrow transplant (H), Qvsld-owmgjk-aflj disease, unspecified (H), Diminished libido, Erectile dysfunction, unspecified erectile dysfunction type, Heartburn       traZODone 150 MG tablet    DESYREL    90 tablet    Take 1 tablet (150 mg) by mouth At Bedtime    Anxiety, CML (chronic myelocytic leukemia) (H), Wavxx-npmbrv-bkze disease, unspecified (H), Panlobular emphysema (H), Heartburn, Cough, Erectile dysfunction, unspecified erectile dysfunction type, Status post allogeneic bone marrow transplant (H), Diminished libido          Performed By: Estela MARTINEZ. *pregnancy control is validated Lot # (Optional): [id number] Urine Pregnancy Test Result: negative Expiration Date (Optional): 10/31/2022 Detail Level: None

## 2021-03-26 ENCOUNTER — HOSPITAL ENCOUNTER (OUTPATIENT)
Facility: AMBULATORY SURGERY CENTER | Age: 41
Discharge: HOME | End: 2021-03-26
Attending: INTERNAL MEDICINE | Admitting: INTERNAL MEDICINE
Payer: COMMERCIAL

## 2021-03-26 VITALS
RESPIRATION RATE: 16 BRPM | BODY MASS INDEX: 30.31 KG/M2 | HEART RATE: 82 BPM | SYSTOLIC BLOOD PRESSURE: 136 MMHG | OXYGEN SATURATION: 97 % | DIASTOLIC BLOOD PRESSURE: 73 MMHG | WEIGHT: 200 LBS | HEIGHT: 68 IN | TEMPERATURE: 97.5 F

## 2021-03-26 LAB — UPPER GI ENDOSCOPY: NORMAL

## 2021-03-26 PROCEDURE — 43235 EGD DIAGNOSTIC BRUSH WASH: CPT

## 2021-03-26 RX ORDER — PROCHLORPERAZINE MALEATE 10 MG
10 TABLET ORAL EVERY 6 HOURS PRN
Status: DISCONTINUED | OUTPATIENT
Start: 2021-03-26 | End: 2021-03-27 | Stop reason: HOSPADM

## 2021-03-26 RX ORDER — FLUMAZENIL 0.1 MG/ML
0.2 INJECTION, SOLUTION INTRAVENOUS
Status: ACTIVE | OUTPATIENT
Start: 2021-03-26 | End: 2021-03-26

## 2021-03-26 RX ORDER — NALOXONE HYDROCHLORIDE 0.4 MG/ML
0.2 INJECTION, SOLUTION INTRAMUSCULAR; INTRAVENOUS; SUBCUTANEOUS
Status: DISCONTINUED | OUTPATIENT
Start: 2021-03-26 | End: 2021-03-27 | Stop reason: HOSPADM

## 2021-03-26 RX ORDER — DIPHENHYDRAMINE HYDROCHLORIDE 50 MG/ML
INJECTION INTRAMUSCULAR; INTRAVENOUS PRN
Status: DISCONTINUED | OUTPATIENT
Start: 2021-03-26 | End: 2021-03-26 | Stop reason: HOSPADM

## 2021-03-26 RX ORDER — ONDANSETRON 4 MG/1
4 TABLET, ORALLY DISINTEGRATING ORAL EVERY 6 HOURS PRN
Status: DISCONTINUED | OUTPATIENT
Start: 2021-03-26 | End: 2021-03-27 | Stop reason: HOSPADM

## 2021-03-26 RX ORDER — FENTANYL CITRATE 50 UG/ML
INJECTION, SOLUTION INTRAMUSCULAR; INTRAVENOUS PRN
Status: DISCONTINUED | OUTPATIENT
Start: 2021-03-26 | End: 2021-03-26 | Stop reason: HOSPADM

## 2021-03-26 RX ORDER — NALOXONE HYDROCHLORIDE 0.4 MG/ML
0.4 INJECTION, SOLUTION INTRAMUSCULAR; INTRAVENOUS; SUBCUTANEOUS
Status: DISCONTINUED | OUTPATIENT
Start: 2021-03-26 | End: 2021-03-27 | Stop reason: HOSPADM

## 2021-03-26 RX ORDER — ONDANSETRON 2 MG/ML
4 INJECTION INTRAMUSCULAR; INTRAVENOUS EVERY 6 HOURS PRN
Status: DISCONTINUED | OUTPATIENT
Start: 2021-03-26 | End: 2021-03-27 | Stop reason: HOSPADM

## 2021-03-26 RX ORDER — LIDOCAINE 40 MG/G
CREAM TOPICAL
Status: DISCONTINUED | OUTPATIENT
Start: 2021-03-26 | End: 2021-03-26 | Stop reason: HOSPADM

## 2021-03-26 RX ORDER — SIMETHICONE
LIQUID (ML) MISCELLANEOUS PRN
Status: DISCONTINUED | OUTPATIENT
Start: 2021-03-26 | End: 2021-03-26 | Stop reason: HOSPADM

## 2021-03-26 RX ORDER — ONDANSETRON 2 MG/ML
4 INJECTION INTRAMUSCULAR; INTRAVENOUS
Status: DISCONTINUED | OUTPATIENT
Start: 2021-03-26 | End: 2021-03-26 | Stop reason: HOSPADM

## 2021-03-26 ASSESSMENT — MIFFLIN-ST. JEOR: SCORE: 1791.69

## 2021-03-29 ENCOUNTER — TELEPHONE (OUTPATIENT)
Dept: GASTROENTEROLOGY | Facility: CLINIC | Age: 41
End: 2021-03-29

## 2021-03-29 DIAGNOSIS — Z11.59 ENCOUNTER FOR SCREENING FOR OTHER VIRAL DISEASES: ICD-10-CM

## 2021-03-29 LAB
SARS-COV-2 RNA RESP QL NAA+PROBE: NORMAL
SPECIMEN SOURCE: NORMAL

## 2021-03-29 PROCEDURE — U0003 INFECTIOUS AGENT DETECTION BY NUCLEIC ACID (DNA OR RNA); SEVERE ACUTE RESPIRATORY SYNDROME CORONAVIRUS 2 (SARS-COV-2) (CORONAVIRUS DISEASE [COVID-19]), AMPLIFIED PROBE TECHNIQUE, MAKING USE OF HIGH THROUGHPUT TECHNOLOGIES AS DESCRIBED BY CMS-2020-01-R: HCPCS | Performed by: INTERNAL MEDICINE

## 2021-03-29 PROCEDURE — U0005 INFEC AGEN DETEC AMPLI PROBE: HCPCS | Performed by: INTERNAL MEDICINE

## 2021-03-29 NOTE — TELEPHONE ENCOUNTER
Patient is scheduled for manometry with GI nursing staff    Spoke with: Caesar Richardson    Date of Procedure: 4/1/2021     [place] [facility] [place]edation Type N/A    Conscious Sedation- Needs  for 6 hours after the procedure  MAC/General-Needs  for 24 hours after procedure    Pre-op for Unit J MAC and OR not neededn    (if yes advise patient they will need a pre-op prior to procedure)      Is patient on blood thinners? -no (If yes- inform patient to follow up with PCP or provider for follow up instructions)     Informed patient they will need an adult  n/a  Cannot take any type of public or medical transportation alone    Informed Patient of COVID Test Requirement  Yes and scheduled    Preferred Pharmacy for Pre Prescription o chart    Confirmed Nurse will call to complete assessment yes    Additional comments: no

## 2021-03-30 ENCOUNTER — PATIENT OUTREACH (OUTPATIENT)
Dept: GASTROENTEROLOGY | Facility: CLINIC | Age: 41
End: 2021-03-30

## 2021-03-30 NOTE — PROGRESS NOTES
Reached out to pt to inquire if they had any questions about esophageal manometry scheduled for Thursday 4/1. Pt verbalized full understanding of basics of the test.

## 2021-04-01 ENCOUNTER — OFFICE VISIT (OUTPATIENT)
Dept: GASTROENTEROLOGY | Facility: CLINIC | Age: 41
End: 2021-04-01
Payer: COMMERCIAL

## 2021-04-01 VITALS
SYSTOLIC BLOOD PRESSURE: 129 MMHG | RESPIRATION RATE: 16 BRPM | DIASTOLIC BLOOD PRESSURE: 80 MMHG | HEART RATE: 90 BPM | OXYGEN SATURATION: 98 % | BODY MASS INDEX: 29.62 KG/M2 | WEIGHT: 200 LBS | TEMPERATURE: 98 F | HEIGHT: 69 IN

## 2021-04-01 DIAGNOSIS — K21.01 GASTROESOPHAGEAL REFLUX DISEASE WITH ESOPHAGITIS AND HEMORRHAGE: ICD-10-CM

## 2021-04-01 PROCEDURE — 91010 ESOPHAGUS MOTILITY STUDY: CPT

## 2021-04-01 PROCEDURE — 91037 ESOPH IMPED FUNCTION TEST: CPT

## 2021-04-01 ASSESSMENT — MIFFLIN-ST. JEOR: SCORE: 1799.63

## 2021-04-01 ASSESSMENT — PAIN SCALES - GENERAL: PAINLEVEL: MILD PAIN (2)

## 2021-04-01 NOTE — PROGRESS NOTES
Non-Invasive GI Procedure Visit    Caesar Richardson is a 40 year old male with history of Gastroesophageal reflux disease with esophagitis and hemorrhage.   Patient stated reason for procedure: GERD and Pre-surgical Evaluation  COVID-19 Test Performed within 48-72hrs of the procedure:  Yes. COVID-19 result was NEGATIVE.    COVID-19 PCR Results    COVID-19 PCR Results 1/21/21 1/21/21 2/19/21 2/19/21 3/23/21 3/23/21 3/29/21 3/29/21    0742 0742 1136 1136 0840 0840 1414 1414   COVID-19 Virus PCR to [facility] - Result Test received-See reflex to IDDL test SARS CoV2 (COVID-19) Virus RT-PCR  Test received-See reflex to IDDL test SARS CoV2 (COVID-19) Virus RT-PCR  Test received-See reflex to IDDL test SARS CoV2 (COVID-19) Virus RT-PCR  Test received-See reflex to IDDL test SARS CoV2 (COVID-19) Virus RT-PCR    COVID-19 Virus PCR to [facility] - Source Nasopharyngeal  Nasopharyngeal  Nasopharyngeal  Nasopharyngeal    SARS-CoV-2 Virus Specimen Source  Nasopharyngeal  Nasopharyngeal  Nasopharyngeal  Nasopharyngeal   SARS-CoV-2 PCR Result  NEGATIVE  NEGATIVE  NEGATIVE  NEGATIVE      Comments are available for some flowsheets but are not being displayed.         COVID-19 Antibody Results, Testing for Immunity    COVID-19 Antibody Results, Testing for Immunity   No data to display.             Pre-Procedure Assessment  Patient presents to clinic today for Esophageal Manometry Study    Referring Provider: Dr Sascha Gunter  Patient has undergone previous endoscopy.    Does patient report taking a PPI (omeprazole, pantoprazole, rabeprazole, lansoprazole, esomeprazole, dexlansoprazole)? Yes. Is patient taking a PPI twice daily? Yes  Does patient report taking a H2 blocker (ranitidine, or famotidine)? No  Does patient report taking opioids? No  Patient reported that last food and/or drink was last consumed 5   hours ago.  Esophageal Questionnaire(s) Completed:   Yes - Esophageal Questionnaire(s)    BEDQ Questionnaire  BEDQ Questionnaire: How  Often Have You Had the Following? 4/1/2021   Trouble eating solid food (meat, bread, vegetables) 3   Trouble eating soft foods (yogurt, jello, pudding) 0   Trouble swallowing liquids 0   Pain while swallowing 4   Coughing or choking while swallowing foods or liquids 0   Total Score: 7     BEDQ Questionnaire: Discomfort/Pain Ratings 4/1/2021   Eating solid food (meat, bread, vegetables) 2   Eating soft foods (yogurt, jello, pudding) 2   Drinking liquid 2   Total Score: 6       Eckardt Questionnaire  Eckardt Questionnaire 4/1/2021   Dysphagia 2   Regurgitation 0   Retrosternal Pain 2   Weight Loss (kg) 0   Total Score:  4       Promis 10 Questionnaire  PROMIS 10 FLOWSHEET DATA 10/26/2017 2/5/2018 4/9/2018 7/23/2018 4/1/2021   In general, would you say your health is: 2 3 2 3 5   In general, would you say your quality of life is: 3 1 3 3 5   In general, how would you rate your physical health? 2 1 3 3 3   In general, how would you rate your mental health, including your mood and your ability to think? 5 5 5 3 4   In general, how would you rate your satisfaction with your social activities and relationships? 5 3 3 3 5   In general, please rate how well you carry out your usual social activities and roles. (This includes activities at home, at work and in your community, and responsibilities as a parent, child, spouse, employee, friend, etc.) 2 5 3 2 5   To what extent are you able to carry out your everyday physical activities such as walking, climbing stairs, carrying groceries, or moving a chair? 2 2 3 2 4   In the past 7 days, how often have you been bothered by emotional problems such as feeling anxious, depressed, or irritable? 1 2 2 3 2   In the past 7 days, how would you rate your fatigue on average? 5 4 3 3 2   In the past 7 days, how would you rate your pain on average, where 0 means no pain, and 10 means worst imaginable pain? 9 9 3 3 0   Mental health question re-calculation - no clinical value 5 4 4 3 4  "  Physical health question re-calculation - no clinical value 1 2 3 3 4   Pain question re-calculation - no clinical value 2 2 4 4 5   Global Mental Health Score 18 13 15 12 18   Global Physical Health Score 7 7 13 12 16   PROMIS TOTAL - SUBSCORES 25 20 28 24 34       .    Patient Hx  Patient's history, medications and allergies were reviewed.     Height: 5' 8.5\"   Weight: 200 lbs 0 oz    Patient Active Problem List    Diagnosis Date Noted     Elective surgery 03/02/2018     Priority: Medium     S/P bilateral unicompartmental knee replacement 03/02/2018     Priority: Medium     S/P total knee arthroplasty 03/01/2018     Priority: Medium     Skin erosion 09/26/2015     Priority: Medium     Pain 03/07/2014     Priority: Medium     Erectile dysfunction 07/19/2013     Priority: Medium     Testicular hypofunction 07/19/2013     Priority: Medium     Encounter for long-term current use of medication 02/11/2013     Priority: Medium     Problem list name updated by automated process. 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 Provider to review       Qrdmc-fuzxwd-pobi disease (H) 07/21/2011     Priority: Medium     Problem list name updated by automated process. Provider to review       Hypogammaglobulinemia (H) 07/21/2011     Priority: Medium     Problem list name updated by automated process. Provider to review       Pain in joint, lower leg 07/21/2011     Priority: Medium     Rheumatoid arthritis (H) 07/21/2011 "     Priority: Medium     Problem list name updated by automated process. Provider to review       CML (chronic myeloid leukemia) in relapse (H) 07/13/2011     Priority: Medium     Status post allogeneic bone marrow transplant (H) 07/13/2011     Priority: Medium     (Problem list name updated by automated process. Provider to review and confirm.)       Sprain and strain of other specified sites of shoulder and upper arm 12/09/2003     Priority: Medium      Prior to Admission medications    Medication Sig Start Date End Date Taking? Authorizing Provider   diclofenac (VOLTAREN) 1 % GEL Apply topically 4 times daily 12/9/15   Sebas Leon MD   fluticasone-salmeterol (ADVAIR) 250-50 MCG/DOSE diskus inhaler Inhale 1 puff into the lungs 2 times daily 4/14/17   Sebas Leon MD   ibuprofen (ADVIL/MOTRIN) 200 MG tablet Take 200 mg by mouth every 4 hours as needed for mild pain    Reported, Patient   nicotine (NICODERM CQ) 21 MG/24HR 24 hr patch Place 1 patch onto the skin every 24 hours    Reported, Patient   omeprazole (PRILOSEC) 40 MG DR capsule Take 1 capsule (40 mg) by mouth 2 times daily (before meals) 1/25/21   Meño Murphy MD   tadalafil (CIALIS) 10 MG tablet Take 1 tablet (10 mg) by mouth daily as needed (take 1 tablet 30 min prior to sexual intercourse) 3/2/21   Sebas Leon MD   traZODone (DESYREL) 150 MG tablet Take 1 tablet (150 mg) by mouth At Bedtime 12/9/15   Sebas Leon MD     No Known Allergies  Past Medical History:   Diagnosis Date     Arthritis      BMT (bone marrow transplant) 2006     CML (chronic myelocytic leukemia) (H)      COPD (chronic obstructive pulmonary disease) (H) 1/9/2013     GERD (gastroesophageal reflux disease)      Mrzua-ndsuqa-sifj disease (H) 7/21/2011     Problem list name updated by automated process. Provider to review     Rheumatoid arthritis (H) 7/21/2011     Problem list name updated by automated process.  Provider to review     Past Surgical History:   Procedure Laterality Date     ARTHROPLASTY KNEE UNICOMPARTMENT BILATERAL Bilateral 3/1/2018    Procedure: ARTHROPLASTY KNEE UNICOMPARTMENT BILATERAL;  Bilateral Knee Uni Compartmental Arthroplasty;  Surgeon: Pradeep Turner MD;  Location: [facility] OR     BMT CELL PRODUCT INFUSION       ESOPHAGOSCOPY, GASTROSCOPY, DUODENOSCOPY (EGD), COMBINED N/A 2021    Procedure: ESOPHAGOGASTRODUODENOSCOPY, WITH BIOPSY;  Surgeon: Meño Murphy MD;  Location: [facility] OR     ESOPHAGOSCOPY, GASTROSCOPY, DUODENOSCOPY (EGD), COMBINED N/A 3/26/2021    Procedure: ESOPHAGOGASTRODUODENOSCOPY (EGD);  Surgeon: Jose Carlos Guzman MD;  Location: [facility] OR     LARYNGOSCOPY WITH BIOPSY(IES) N/A 2021    Procedure: LARYNGOSCOPY, WITH BIOPSY;  Surgeon: Santo Sigala MD;  Location: [facility] OR     ORTHOPEDIC SURGERY       TRANSPLANT      BMT     Family History   Problem Relation Age of Onset     Diabetes Father      Arthritis Father      Heart Disease Father      Respiratory Father      Allergies Mother      Respiratory Mother      Asthma Brother      Allergies Brother      Diabetes Paternal Grandmother      Social History     Tobacco Use     Smoking status: Current Every Day Smoker     Packs/day: 1.00     Years: 25.00     Pack years: 25.00     Types: Cigarettes     Last attempt to quit: 2019     Years since quittin.2     Smokeless tobacco: Never Used     Tobacco comment: down to few cigs per week   Substance Use Topics     Alcohol use: No     Alcohol/week: 1.7 standard drinks        Pre-Procedure Education & Consent  Procedure education was provided to: Patient  Teaching method: Explanation  Barriers to learning: No Barrier    Patient indicated understanding of pre-procedure instruction and appropriate consent was obtained and documented.    ____________________________________________________________________    Post-Procedure Documentation: Esophageal  Manometry    Manometry catheter was placed via right nare to 52 cm and normal saline swallows given per protocol. Manometry catheter was removed at the end of test.    Discharge instructions given to patient.    Notification of pending test results sent to provider for interpretation. Please reference scanned document for final interpretation of results. Patient will follow up with referring provider for test results.    Raquel Ji RN on 4/1/2021 at 2:12 PM

## 2021-04-01 NOTE — PATIENT INSTRUCTIONS
Esophogeal Manometry Study  1. Resume regular diet.  2. You may have a bloody nose or sore throat after the procedure.  3. If you have questions call [phone] from 7:00am-5:00pm.  For afterhours questions call GI doctor on call at [phone].    Raquel Ji RN Motility Nurse  Non Invasive GI Procedures  Phone: [phone]

## 2021-04-07 ENCOUNTER — PATIENT OUTREACH (OUTPATIENT)
Dept: GASTROENTEROLOGY | Facility: CLINIC | Age: 41
End: 2021-04-07

## 2021-04-10 ENCOUNTER — HEALTH MAINTENANCE LETTER (OUTPATIENT)
Age: 41
End: 2021-04-10

## 2021-04-12 ENCOUNTER — PATIENT OUTREACH (OUTPATIENT)
Dept: GASTROENTEROLOGY | Facility: CLINIC | Age: 41
End: 2021-04-12

## 2021-04-12 NOTE — TELEPHONE ENCOUNTER
Reached out to pt to assist with scheduling tests ordered by Dr Gunter. Pt will reaching scheduling and call writer back as needed.

## 2021-04-13 ENCOUNTER — TELEPHONE (OUTPATIENT)
Dept: PULMONOLOGY | Facility: CLINIC | Age: 41
End: 2021-04-13

## 2021-04-13 ENCOUNTER — ANCILLARY PROCEDURE (OUTPATIENT)
Dept: CT IMAGING | Facility: CLINIC | Age: 41
End: 2021-04-13
Attending: INTERNAL MEDICINE
Payer: COMMERCIAL

## 2021-04-13 DIAGNOSIS — R05.9 COUGH: ICD-10-CM

## 2021-04-13 PROCEDURE — 94375 RESPIRATORY FLOW VOLUME LOOP: CPT | Performed by: INTERNAL MEDICINE

## 2021-04-13 PROCEDURE — 71250 CT THORAX DX C-: CPT | Mod: GC | Performed by: RADIOLOGY

## 2021-04-13 PROCEDURE — 94729 DIFFUSING CAPACITY: CPT | Performed by: INTERNAL MEDICINE

## 2021-04-13 NOTE — TELEPHONE ENCOUNTER
Received communication about scheduling referral for this pt. Initially thought it was ILD, but ILD team took a look and pt is a general pt, previously seeing Dr. Nick 5+ years ago. He will be seen as a NEW patient and as pt is now post-BMT, discussed with Dr. Nick for placement in his BMT clinic. Spoke to pt and able to schedule appt. Testing completed earlier today and appt with Dr. Nick is on 4/20, thus repeat testing is not indicated. Details confirmed with pt.

## 2021-04-14 LAB
DLCOUNC-%PRED-PRE: 97 %
DLCOUNC-PRE: 27.66 ML/MIN/MMHG
DLCOUNC-PRED: 28.27 ML/MIN/MMHG
ERV-%PRED-PRE: 74 %
ERV-PRE: 0.83 L
ERV-PRED: 1.11 L
EXPTIME-PRE: 8.32 SEC
FEF2575-%PRED-PRE: 36 %
FEF2575-PRE: 1.38 L/SEC
FEF2575-PRED: 3.82 L/SEC
FEFMAX-%PRED-PRE: 81 %
FEFMAX-PRE: 7.73 L/SEC
FEFMAX-PRED: 9.51 L/SEC
FEV1-%PRED-PRE: 76 %
FEV1-PRE: 2.94 L
FEV1FEV6-PRE: 63 %
FEV1FEV6-PRED: 82 %
FEV1FVC-PRE: 64 %
FEV1FVC-PRED: 81 %
FEV1SVC-PRE: 58 %
FEV1SVC-PRED: 78 %
FIFMAX-PRE: 6.45 L/SEC
FVC-%PRED-PRE: 97 %
FVC-PRE: 4.61 L
FVC-PRED: 4.73 L
IC-%PRED-PRE: 111 %
IC-PRE: 4.24 L
IC-PRED: 3.81 L
VA-%PRED-PRE: 119 %
VA-PRE: 7.2 L
VC-%PRED-PRE: 102 %
VC-PRE: 5.06 L
VC-PRED: 4.93 L

## 2021-04-14 NOTE — TELEPHONE ENCOUNTER
RECORDS RECEIVED FROM: internal   DATE RECEIVED: 4.20.21    NOTES STATUS DETAILS   OFFICE NOTE from referring provider internal Sascha Gunter,   OFFICE NOTE from other specialist na    DISCHARGE SUMMARY from hospital na    DISCHARGE REPORT from the ER na    MEDICATION LIST internal    IMAGING  (NEED IMAGES AND REPORTS)     CT SCAN internal 4.13.21    CHEST XRAY (CXR) na    TESTS     PULMONARY FUNCTION TESTING (PFT) internal 4.13.21

## 2021-04-15 ENCOUNTER — TELEPHONE (OUTPATIENT)
Dept: PULMONOLOGY | Facility: CLINIC | Age: 41
End: 2021-04-15

## 2021-04-15 DIAGNOSIS — Z11.59 ENCOUNTER FOR SCREENING FOR OTHER VIRAL DISEASES: ICD-10-CM

## 2021-04-15 DIAGNOSIS — R93.89 ABNORMAL FINDING ON IMAGING: Primary | ICD-10-CM

## 2021-04-15 NOTE — TELEPHONE ENCOUNTER
Contacted by Dr Nick to schedule bronch for patient on Wed 4/21/21. The time is 11 am with check in at 10 am on 3rd floor of [facility] on [address]in endoscopy. Instructions include: must have , no food or fluids after midnight, hold meds until after exam, will have throat numbed and given IV sedation, allow 4 hrs for entire time, tell nurse after procedure if having SOB, chest pain or more then flecks of blood with cough, risk factors given and he will call COVID number back to have them help schedule COVID test prior to bronch. Left message with patient that changed bronch from 10 am to 11 am with check in at 10 am.

## 2021-04-15 NOTE — TELEPHONE ENCOUNTER
Spoke with patient and prepped him for bronch scheduled at DR Nick's request. Patient will call COVID staff back to obtain help scheduling COVID test prior to bronch.

## 2021-04-15 NOTE — TELEPHONE ENCOUNTER
[facility] Call Center    Phone Message    May a detailed message be left on voicemail: yes     Reason for Call: Other: Covid Testing Scheduling called to report that Caesar was unaware of the Broncoscopy that is scheduled for him on 4/21. She states he did not schedule his Covid Test and is wanting a call back from the clinic to discuss the procedure.  Please call him back to discuss     Action Taken: Message routed to:  [facility]    Travel Screening: Not Applicable

## 2021-04-16 ENCOUNTER — AMBULATORY - HEALTHEAST (OUTPATIENT)
Dept: FAMILY MEDICINE | Facility: CLINIC | Age: 41
End: 2021-04-16

## 2021-04-16 DIAGNOSIS — Z11.59 ENCOUNTER FOR SCREENING FOR OTHER VIRAL DISEASES: ICD-10-CM

## 2021-04-17 DIAGNOSIS — Z11.59 ENCOUNTER FOR SCREENING FOR OTHER VIRAL DISEASES: ICD-10-CM

## 2021-04-17 LAB
SARS-COV-2 RNA RESP QL NAA+PROBE: NORMAL
SPECIMEN SOURCE: NORMAL

## 2021-04-17 PROCEDURE — U0003 INFECTIOUS AGENT DETECTION BY NUCLEIC ACID (DNA OR RNA); SEVERE ACUTE RESPIRATORY SYNDROME CORONAVIRUS 2 (SARS-COV-2) (CORONAVIRUS DISEASE [COVID-19]), AMPLIFIED PROBE TECHNIQUE, MAKING USE OF HIGH THROUGHPUT TECHNOLOGIES AS DESCRIBED BY CMS-2020-01-R: HCPCS | Performed by: INTERNAL MEDICINE

## 2021-04-17 PROCEDURE — U0005 INFEC AGEN DETEC AMPLI PROBE: HCPCS | Performed by: INTERNAL MEDICINE

## 2021-04-20 ENCOUNTER — OFFICE VISIT (OUTPATIENT)
Dept: PULMONOLOGY | Facility: CLINIC | Age: 41
End: 2021-04-20
Attending: INTERNAL MEDICINE
Payer: COMMERCIAL

## 2021-04-20 ENCOUNTER — PRE VISIT (OUTPATIENT)
Dept: PULMONOLOGY | Facility: CLINIC | Age: 41
End: 2021-04-20

## 2021-04-20 VITALS
DIASTOLIC BLOOD PRESSURE: 86 MMHG | WEIGHT: 195 LBS | HEART RATE: 84 BPM | SYSTOLIC BLOOD PRESSURE: 125 MMHG | BODY MASS INDEX: 29.65 KG/M2 | OXYGEN SATURATION: 98 % | RESPIRATION RATE: 18 BRPM

## 2021-04-20 DIAGNOSIS — J16.8 FUNGAL PNEUMONIA: Primary | ICD-10-CM

## 2021-04-20 DIAGNOSIS — J16.8 FUNGAL PNEUMONIA: ICD-10-CM

## 2021-04-20 DIAGNOSIS — B49 FUNGAL PNEUMONIA: ICD-10-CM

## 2021-04-20 DIAGNOSIS — B49 FUNGAL PNEUMONIA: Primary | ICD-10-CM

## 2021-04-20 DIAGNOSIS — K21.01 GASTROESOPHAGEAL REFLUX DISEASE WITH ESOPHAGITIS AND HEMORRHAGE: Primary | ICD-10-CM

## 2021-04-20 PROCEDURE — G0463 HOSPITAL OUTPT CLINIC VISIT: HCPCS

## 2021-04-20 PROCEDURE — 99204 OFFICE O/P NEW MOD 45 MIN: CPT | Performed by: INTERNAL MEDICINE

## 2021-04-20 PROCEDURE — 82784 ASSAY IGA/IGD/IGG/IGM EACH: CPT | Performed by: INTERNAL MEDICINE

## 2021-04-20 ASSESSMENT — PAIN SCALES - GENERAL: PAINLEVEL: EXTREME PAIN (8)

## 2021-04-20 NOTE — LETTER
4/20/2021         RE: Caesar Richardson  [address] [facility] [address]        Dear Colleague,    Thank you for referring your patient, Caesar Richardson, to the [facility] [place]. Please see a copy of my visit note below.    Reason for Visit  Caesar Richardson is a 40 year old year old male who is being seen for No chief complaint on file.    Pulmonary HPI  39 YO s/p BMT, > 6 months out referred by Dr. Leon for cough, SOB and abnormal chest CT. Seen in clinic 12-17 with increased cough productive of brown/green sputum with presence of old blood, increased SOB, chills, and decreased appetite and energy. 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 Has been compliant except for being off therapy for 5-6 days 10 days ago. States at present he has no cough or sputum production. No SOB/MARQUES when walking on flat surface at any pace but + MARQUES after walking one flight of stairs. Denies wheezing. Also suffers from chronic knee pain. + Tob 1` ppd x 20 years, cut back to 1/2 ppd x 3 weeks. Not ready to quit smoking. + FH of COPD- both parents in their 50's.    Last seen 8 year ago. Referred back to the clinic for evaluation of cough and abnormal chest CT.  Had history of COPD and was on Advair and Spiriva.  Repeat CT on 4-13 shows patchy areas of tree in bud opacities.  Has issues with reflux and mucus in throat. Has some cough but mostly coming from his throat. No SOB.  No fevers or chills. Denies mold exposure; currently residing in home built in 1948, denies any water damage or musty smell.  Most recently was working as a - denies any exposure to dust or fumes.  Quit smoking 5 weeks ago; no further use of MJ. Remains on Advair.    The patient was seen and examined by Brenden Nick MD           Current Outpatient Medications   Medication     diclofenac (VOLTAREN) 1 % GEL     fluticasone-salmeterol (ADVAIR) 250-50 MCG/DOSE diskus inhaler      ibuprofen (ADVIL/MOTRIN) 200 MG tablet     nicotine (NICODERM CQ) 21 MG/24HR 24 hr patch     omeprazole (PRILOSEC) 40 MG DR capsule     tadalafil (CIALIS) 10 MG tablet     traZODone (DESYREL) 150 MG tablet     No current facility-administered medications for this visit.      No Known Allergies  Past Medical History:   Diagnosis Date     Arthritis      BMT (bone marrow transplant) 2006     CML (chronic myelocytic leukemia) (H)      COPD (chronic obstructive pulmonary disease) (H) 1/9/2013     GERD (gastroesophageal reflux disease)      Owmee-ynutbw-yfdl disease (H) 7/21/2011     Problem list name updated by automated process. Provider to review     Rheumatoid arthritis (H) 7/21/2011     Problem list name updated by automated process. Provider to review       Past Surgical History:   Procedure Laterality Date     ARTHROPLASTY KNEE UNICOMPARTMENT BILATERAL Bilateral 3/1/2018    Procedure: ARTHROPLASTY KNEE UNICOMPARTMENT BILATERAL;  Bilateral Knee Uni Compartmental Arthroplasty;  Surgeon: Pradeep Turenr MD;  [facility]: [facility]     BMT CELL PRODUCT INFUSION  2012     ESOPHAGOSCOPY, GASTROSCOPY, DUODENOSCOPY (EGD), COMBINED N/A 1/25/2021    Procedure: ESOPHAGOGASTRODUODENOSCOPY, WITH BIOPSY;  Surgeon: Meño Murphy MD;  Location: [facility] OR     ESOPHAGOSCOPY, GASTROSCOPY, DUODENOSCOPY (EGD), COMBINED N/A 3/26/2021    Procedure: ESOPHAGOGASTRODUODENOSCOPY (EGD);  Surgeon: Jose Carlos Guzman MD;  Location: [facility] OR     LARYNGOSCOPY WITH BIOPSY(IES) N/A 2/23/2021    Procedure: LARYNGOSCOPY, WITH BIOPSY;  Surgeon: Santo Sigala MD;  [facility]: [facility]     ORTHOPEDIC SURGERY  2007     TRANSPLANT  2006    BMT       Social History     Socioeconomic History     Marital status:      Spouse name: Not on file     Number of children: 0     Years of education: 12     Highest education level: Not on file   Occupational History     Occupation: lawn care     Employer: [employer]   Social Needs      Financial resource strain: Not on file     Food insecurity     Worry: Not on file     Inability: Not on file     Transportation needs     Medical: Not on file     Non-medical: Not on file   Tobacco Use     Smoking status: Current Every Day Smoker     Packs/day: 1.00     Years: 25.00     Pack years: 25.00     Types: Cigarettes     Last attempt to quit: 2019     Years since quittin.2     Smokeless tobacco: Never Used     Tobacco comment: down to few cigs per week   Substance and Sexual Activity     Alcohol use: No     Alcohol/week: 1.7 standard drinks     Drug use: No     Sexual activity: Not on file   Lifestyle     Physical activity     Days per week: Not on file     Minutes per session: Not on file     Stress: Not on file   Relationships     Social connections     Talks on phone: Not on file     Gets together: Not on file     Attends Congregation service: Not on file     Active member of club or organization: Not on file     Attends meetings of clubs or organizations: Not on file     Relationship status: Not on file     Intimate partner violence     Fear of current or ex partner: Not on file     Emotionally abused: Not on file     Physically abused: Not on file     Forced sexual activity: Not on file   Other Topics Concern      Service No     Blood Transfusions No     Caffeine Concern No     Occupational Exposure No     Hobby Hazards No     Sleep Concern No     Stress Concern Yes     Weight Concern No     Special Diet No     Back Care No     Exercise Yes     Bike Helmet No     Seat Belt Yes     Self-Exams No     Parent/sibling w/ CABG, MI or angioplasty before 65F 55M? Not Asked   Social History Narrative     Not on file       FH:  Was reviewed with patient in clinic.  History of COPD.  ROS Pulmonary  A complete ROS was otherwise negative except as noted in the HPI.  There were no vitals taken for this visit.  Exam:   GENERAL APPEARANCE: Well developed, well nourished, alert, and in no apparent  distress.  EYES: PERRL, EOMI  HENT: Nasal mucosa with no edema and no hyperemia. No nasal polyps.  EARS: Canals clear, TMs normal  MOUTH: Oral mucosa is moist, without any lesions, no tonsillar enlargement, no oropharyngeal exudate.  NECK: supple, no masses, no thyromegaly.  LYMPHATICS: No significant axillary, cervical, or supraclavicular nodes.  RESP:  Good air flow throughout.  No crackles. No rhonchi. No wheezes.  CV: Normal S1, S2, regular rhythm, normal rate. No murmur.  No rub. No gallop. No LE edema.   ABDOMEN:  Bowel sounds normal, soft, nontender, no HSM or masses.   MS: extremities normal. No clubbing. No cyanosis.  SKIN: no rash on limited exam  NEURO: Mentation intact, speech normal, normal strength and tone, normal gait and stance  PSYCH: mentation appears normal. and affect normal/bright  Results:  Pulmonary function tests were personally reviewed in clinic  FVC 4.61 (97%), FEV-1 2.94 (76%), FEV-1/FVC 64%  DLCO 97%  Mild airflow limitation.  Normal diffusion capacity.  Compared to 1-31-13 the FVC and FEV-1 are decreased by 10% with a mild decrease in the FEV-1/FVC  Recent Results (from the past 168 hour(s))   Asymptomatic COVID-19 Virus (Coronavirus) by PCR    Collection Time: 04/17/21  3:22 PM    Specimen: Nasopharyngeal   Result Value Ref Range    COVID-19 Virus PCR to [facility] - Source Nasopharyngeal     COVID-19 Virus PCR to [facility] - Result       Test received-See reflex to IDDL test SARS CoV2 (COVID-19) Virus RT-PCR   SARS-CoV-2 COVID-19 Virus (Coronavirus) by PCR    Collection Time: 04/17/21  3:22 PM    Specimen: Nasopharyngeal   Result Value Ref Range    SARS-CoV-2 Virus Specimen Source Nasopharyngeal     SARS-CoV-2 PCR Result NEGATIVE     SARS-CoV-2 PCR Comment       Testing was performed using the Aptima SARS-CoV-2 Assay on the [employer] Instrument System.   Additional information about this Emergency Use Authorization (EUA) assay can be found via   the Lab Guide.         Assessment and plan:      41 YO s/p BMT previously with Rhizopus identified in the sputum with RLL and LLL nodules and ground glass opacity (worsening of LLL nodularity.  Was treated with Posaconazole.  COPD per PFT's and blebs on CT- young age and only 20 pk year history with + family history, 2 first degree family members.  Normal alpha-1 antitrypsin level.   Most recent chest CT with tree in bud opacities concerning for atypical infection (fungus, Nocardia, atypical Mycobacterium).  Needs further invasive testing to determine etiology of CT findings- will proceed with bronchoscopy.    1. Bronchoscopy in am with BAL. Await specific culture results before starting treatment.  If cultures are negative after one week would start anti-fungal coverage.  2. Will check total IgG level- replete if < 400  3. Continue Advair 250/50  4. Congratulated on smoking cessation, discussed importance of staying off cigarettes     RTC in 5 weeks with repeat chest CT.  Patient to call with any questions or concerns prior to the next clinic visit           Again, thank you for allowing me to participate in the care of your patient.        Sincerely,        Brenden Nick MD

## 2021-04-20 NOTE — NURSING NOTE
Chief Complaint   Patient presents with     Consult     Cough      Medications reviewed and updated.  Vitals taken  Katie Jack CMA

## 2021-04-20 NOTE — PROGRESS NOTES
Reason for Visit  Caesar Richardson is a 40 year old year old male who is being seen for No chief complaint on file.    Pulmonary HPI  39 YO s/p BMT, > 6 months out referred by Dr. Leon for cough, SOB and abnormal chest CT. Seen in clinic 12-17 with increased cough productive of brown/green sputum with presence of old blood, increased SOB, chills, and decreased appetite and energy. 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 Has been compliant except for being off therapy for 5-6 days 10 days ago. States at present he has no cough or sputum production. No SOB/MARQUES when walking on flat surface at any pace but + MARQUES after walking one flight of stairs. Denies wheezing. Also suffers from chronic knee pain. + Tob 1` ppd x 20 years, cut back to 1/2 ppd x 3 weeks. Not ready to quit smoking. + FH of COPD- both parents in their 50's.    Last seen 8 year ago. Referred back to the clinic for evaluation of cough and abnormal chest CT.  Had history of COPD and was on Advair and Spiriva.  Repeat CT on 4-13 shows patchy areas of tree in bud opacities.  Has issues with reflux and mucus in throat. Has some cough but mostly coming from his throat. No SOB.  No fevers or chills. Denies mold exposure; currently residing in home built in [address], denies any water damage or musty smell.  Most recently was working as a - denies any exposure to dust or fumes.  Quit smoking 5 weeks ago; no further use of MJ. Remains on Advair.    The patient was seen and examined by Brenden Nick MD           Current Outpatient Medications   Medication     diclofenac (VOLTAREN) 1 % GEL     fluticasone-salmeterol (ADVAIR) 250-50 MCG/DOSE diskus inhaler     ibuprofen (ADVIL/MOTRIN) 200 MG tablet     nicotine (NICODERM CQ) 21 MG/24HR 24 hr patch     omeprazole (PRILOSEC) 40 MG DR capsule     tadalafil (CIALIS) 10 MG tablet     traZODone (DESYREL) 150 MG tablet     No current facility-administered medications for this visit.       No Known Allergies  Past Medical History:   Diagnosis Date     Arthritis      BMT (bone marrow transplant) 2006     CML (chronic myelocytic leukemia) (H)      COPD (chronic obstructive pulmonary disease) (H) 1/9/2013     GERD (gastroesophageal reflux disease)      Jswau-zqxmzu-jcnj disease (H) 7/21/2011     Problem list name updated by automated process. Provider to review     Rheumatoid arthritis (H) 7/21/2011     Problem list name updated by automated process. Provider to review       Past Surgical History:   Procedure Laterality Date     ARTHROPLASTY KNEE UNICOMPARTMENT BILATERAL Bilateral 3/1/2018    Procedure: ARTHROPLASTY KNEE UNICOMPARTMENT BILATERAL;  Bilateral Knee Uni Compartmental Arthroplasty;  Surgeon: Pradeep Turner MD;  [facility]     BMT CELL PRODUCT INFUSION  2012     ESOPHAGOSCOPY, GASTROSCOPY, DUODENOSCOPY (EGD), COMBINED N/A 1/25/2021    Procedure: ESOPHAGOGASTRODUODENOSCOPY, WITH BIOPSY;  Surgeon: Meño Murphy MD;  Location: [facility] OR     ESOPHAGOSCOPY, GASTROSCOPY, DUODENOSCOPY (EGD), COMBINED N/A 3/26/2021    Procedure: ESOPHAGOGASTRODUODENOSCOPY (EGD);  Surgeon: Jose Carlos Guzman MD;  Location: [facility] OR     LARYNGOSCOPY WITH BIOPSY(IES) N/A 2/23/2021    Procedure: LARYNGOSCOPY, WITH BIOPSY;  Surgeon: Santo Sigala MD;  Location: [facility] OR     ORTHOPEDIC SURGERY  2007     TRANSPLANT  2006    BMT       Social History     Socioeconomic History     Marital status:      Spouse name: Not on file     Number of children: 0     Years of education: 12     Highest education level: Not on file   Occupational History     Occupation: lawn care     Employer: [employer]   Social Needs     Financial resource strain: Not on file     Food insecurity     Worry: Not on file     Inability: Not on file     Transportation needs     Medical: Not on file     Non-medical: Not on file   Tobacco Use     Smoking status: Current Every Day Smoker     Packs/day: 1.00      Years: 25.00     Pack years: 25.00     Types: Cigarettes     Last attempt to quit: 2019     Years since quittin.2     Smokeless tobacco: Never Used     Tobacco comment: down to few cigs per week   Substance and Sexual Activity     Alcohol use: No     Alcohol/week: 1.7 standard drinks     Drug use: No     Sexual activity: Not on file   Lifestyle     Physical activity     Days per week: Not on file     Minutes per session: Not on file     Stress: Not on file   Relationships     Social connections     Talks on phone: Not on file     Gets together: Not on file     Attends Yarsanism service: Not on file     Active member of club or organization: Not on file     Attends meetings of clubs or organizations: Not on file     Relationship status: Not on file     Intimate partner violence     Fear of current or ex partner: Not on file     Emotionally abused: Not on file     Physically abused: Not on file     Forced sexual activity: Not on file   Other Topics Concern      Service No     Blood Transfusions No     Caffeine Concern No     Occupational Exposure No     Hobby Hazards No     Sleep Concern No     Stress Concern Yes     Weight Concern No     Special Diet No     Back Care No     Exercise Yes     Bike Helmet No     Seat Belt Yes     Self-Exams No     Parent/sibling w/ CABG, MI or angioplasty before 65F 55M? Not Asked   Social History Narrative     Not on file       FH:  Was reviewed with patient in clinic.  History of COPD.  ROS Pulmonary  A complete ROS was otherwise negative except as noted in the HPI.  There were no vitals taken for this visit.  Exam:   GENERAL APPEARANCE: Well developed, well nourished, alert, and in no apparent distress.  EYES: PERRL, EOMI  HENT: Nasal mucosa with no edema and no hyperemia. No nasal polyps.  EARS: Canals clear, TMs normal  MOUTH: Oral mucosa is moist, without any lesions, no tonsillar enlargement, no oropharyngeal exudate.  NECK: supple, no masses, no  thyromegaly.  LYMPHATICS: No significant axillary, cervical, or supraclavicular nodes.  RESP:  Good air flow throughout.  No crackles. No rhonchi. No wheezes.  CV: Normal S1, S2, regular rhythm, normal rate. No murmur.  No rub. No gallop. No LE edema.   ABDOMEN:  Bowel sounds normal, soft, nontender, no HSM or masses.   MS: extremities normal. No clubbing. No cyanosis.  SKIN: no rash on limited exam  NEURO: Mentation intact, speech normal, normal strength and tone, normal gait and stance  PSYCH: mentation appears normal. and affect normal/bright  Results:  Pulmonary function tests were personally reviewed in clinic  FVC 4.61 (97%), FEV-1 2.94 (76%), FEV-1/FVC 64%  DLCO 97%  Mild airflow limitation.  Normal diffusion capacity.  Compared to 1-31-13 the FVC and FEV-1 are decreased by 10% with a mild decrease in the FEV-1/FVC  Recent Results (from the past 168 hour(s))   Asymptomatic COVID-19 Virus (Coronavirus) by PCR    Collection Time: 04/17/21  3:22 PM    Specimen: Nasopharyngeal   Result Value Ref Range    COVID-19 Virus PCR to [facility] - Source Nasopharyngeal     COVID-19 Virus PCR to [facility] - Result       Test received-See reflex to IDDL test SARS CoV2 (COVID-19) Virus RT-PCR   SARS-CoV-2 COVID-19 Virus (Coronavirus) by PCR    Collection Time: 04/17/21  3:22 PM    Specimen: Nasopharyngeal   Result Value Ref Range    SARS-CoV-2 Virus Specimen Source Nasopharyngeal     SARS-CoV-2 PCR Result NEGATIVE     SARS-CoV-2 PCR Comment       Testing was performed using the Aptima SARS-CoV-2 Assay on the [employer] Instrument System.   Additional information about this Emergency Use Authorization (EUA) assay can be found via   the Lab Guide.         Assessment and plan:     41 YO s/p BMT previously with Rhizopus identified in the sputum with RLL and LLL nodules and ground glass opacity (worsening of LLL nodularity.  Was treated with Posaconazole.  COPD per PFT's and blebs on CT- young age and only 20 pk year history with +  family history, 2 first degree family members.  Normal alpha-1 antitrypsin level.   Most recent chest CT with tree in bud opacities concerning for atypical infection (fungus, Nocardia, atypical Mycobacterium).  Needs further invasive testing to determine etiology of CT findings- will proceed with bronchoscopy.    1. Bronchoscopy in am with BAL. Await specific culture results before starting treatment.  If cultures are negative after one week would start anti-fungal coverage.  2. Will check total IgG level- replete if < 400  3. Continue Advair 250/50  4. Congratulated on smoking cessation, discussed importance of staying off cigarettes     RTC in 5 weeks with repeat chest CT.  Patient to call with any questions or concerns prior to the next clinic visit

## 2021-04-21 ENCOUNTER — HOSPITAL ENCOUNTER (OUTPATIENT)
Facility: CLINIC | Age: 41
Discharge: HOME | End: 2021-04-21
Attending: INTERNAL MEDICINE | Admitting: INTERNAL MEDICINE
Payer: COMMERCIAL

## 2021-04-21 VITALS
RESPIRATION RATE: 18 BRPM | WEIGHT: 194.67 LBS | DIASTOLIC BLOOD PRESSURE: 85 MMHG | HEIGHT: 68 IN | SYSTOLIC BLOOD PRESSURE: 120 MMHG | BODY MASS INDEX: 29.5 KG/M2 | TEMPERATURE: 97.6 F | OXYGEN SATURATION: 95 % | HEART RATE: 91 BPM

## 2021-04-21 LAB
BRONCHOSCOPY: NORMAL
GRAM STN SPEC: ABNORMAL
IGG SERPL-MCNC: 846 MG/DL (ref 610–1616)
MISCELLANEOUS TEST: NORMAL
SPECIMEN SOURCE: ABNORMAL
SPECIMEN SOURCE: ABNORMAL

## 2021-04-21 PROCEDURE — 87186 SC STD MICRODIL/AGAR DIL: CPT | Mod: XS | Performed by: INTERNAL MEDICINE

## 2021-04-21 PROCEDURE — 250N000009 HC RX 250: Performed by: INTERNAL MEDICINE

## 2021-04-21 PROCEDURE — 87015 SPECIMEN INFECT AGNT CONCNTJ: CPT | Mod: XS | Performed by: INTERNAL MEDICINE

## 2021-04-21 PROCEDURE — 89051 BODY FLUID CELL COUNT: CPT | Performed by: INTERNAL MEDICINE

## 2021-04-21 PROCEDURE — 87633 RESP VIRUS 12-25 TARGETS: CPT | Performed by: INTERNAL MEDICINE

## 2021-04-21 PROCEDURE — 87798 DETECT AGENT NOS DNA AMP: CPT | Mod: XS | Performed by: INTERNAL MEDICINE

## 2021-04-21 PROCEDURE — 84999 UNLISTED CHEMISTRY PROCEDURE: CPT | Mod: XS | Performed by: INTERNAL MEDICINE

## 2021-04-21 PROCEDURE — 31624 DX BRONCHOSCOPE/LAVAGE: CPT | Performed by: INTERNAL MEDICINE

## 2021-04-21 PROCEDURE — 87305 ASPERGILLUS AG IA: CPT | Mod: XS | Performed by: INTERNAL MEDICINE

## 2021-04-21 PROCEDURE — 87102 FUNGUS ISOLATION CULTURE: CPT | Performed by: INTERNAL MEDICINE

## 2021-04-21 PROCEDURE — 250N000011 HC RX IP 250 OP 636: Performed by: INTERNAL MEDICINE

## 2021-04-21 PROCEDURE — 87107 FUNGI IDENTIFICATION MOLD: CPT | Performed by: INTERNAL MEDICINE

## 2021-04-21 PROCEDURE — 87077 CULTURE AEROBIC IDENTIFY: CPT | Performed by: INTERNAL MEDICINE

## 2021-04-21 PROCEDURE — G0500 MOD SEDAT ENDO SERVICE >5YRS: HCPCS | Performed by: INTERNAL MEDICINE

## 2021-04-21 PROCEDURE — 87116 MYCOBACTERIA CULTURE: CPT | Mod: XS | Performed by: INTERNAL MEDICINE

## 2021-04-21 PROCEDURE — 87205 SMEAR GRAM STAIN: CPT | Mod: XS | Performed by: INTERNAL MEDICINE

## 2021-04-21 PROCEDURE — 99152 MOD SED SAME PHYS/QHP 5/>YRS: CPT | Performed by: INTERNAL MEDICINE

## 2021-04-21 PROCEDURE — 87070 CULTURE OTHR SPECIMN AEROBIC: CPT | Mod: XS | Performed by: INTERNAL MEDICINE

## 2021-04-21 PROCEDURE — 87206 SMEAR FLUORESCENT/ACID STAI: CPT | Performed by: INTERNAL MEDICINE

## 2021-04-21 RX ORDER — FENTANYL CITRATE 50 UG/ML
INJECTION, SOLUTION INTRAMUSCULAR; INTRAVENOUS PRN
Status: COMPLETED | OUTPATIENT
Start: 2021-04-21 | End: 2021-04-21

## 2021-04-21 RX ORDER — LIDOCAINE HYDROCHLORIDE 40 MG/ML
INJECTION, SOLUTION RETROBULBAR PRN
Status: COMPLETED | OUTPATIENT
Start: 2021-04-21 | End: 2021-04-21

## 2021-04-21 RX ORDER — LIDOCAINE HYDROCHLORIDE 10 MG/ML
INJECTION, SOLUTION INFILTRATION; PERINEURAL PRN
Status: COMPLETED | OUTPATIENT
Start: 2021-04-21 | End: 2021-04-21

## 2021-04-21 RX ADMIN — WATER 180 ML: 100 IRRIGANT IRRIGATION at 11:28

## 2021-04-21 RX ADMIN — WATER 180 ML: 100 IRRIGANT IRRIGATION at 11:21

## 2021-04-21 RX ADMIN — LIDOCAINE HYDROCHLORIDE 12 ML: 10 INJECTION, SOLUTION INFILTRATION; PERINEURAL at 11:22

## 2021-04-21 RX ADMIN — MIDAZOLAM 1 MG: 1 INJECTION INTRAMUSCULAR; INTRAVENOUS at 11:11

## 2021-04-21 RX ADMIN — FENTANYL CITRATE 25 MCG: 50 INJECTION, SOLUTION INTRAMUSCULAR; INTRAVENOUS at 11:18

## 2021-04-21 RX ADMIN — FENTANYL CITRATE 50 MCG: 50 INJECTION, SOLUTION INTRAMUSCULAR; INTRAVENOUS at 11:13

## 2021-04-21 RX ADMIN — FENTANYL CITRATE 50 MCG: 50 INJECTION, SOLUTION INTRAMUSCULAR; INTRAVENOUS at 11:11

## 2021-04-21 RX ADMIN — LIDOCAINE HYDROCHLORIDE 9 ML: 40 INJECTION, SOLUTION RETROBULBAR; TOPICAL at 11:22

## 2021-04-21 ASSESSMENT — MIFFLIN-ST. JEOR: SCORE: 1767.5

## 2021-04-21 NOTE — DISCHARGE SUMMARY
Discharge instructions reviewed with patients friendPaulino, via phone with patient listening on, all questions and concerns addressed, patient and friend verbalized understanding and retention of all education given during phone call.

## 2021-04-21 NOTE — RESULT ENCOUNTER NOTE
Caesar,   This CT scan reads exactly like the last one her in our system in 2014.    You are seeing a lung specialist which is good.    I will follow up with you after that work up is complete.   Sascha Gunter MD  Associate Professor of Medicine  Division of Gastroenterology, Hepatology, and Nutrition  [facility]

## 2021-04-21 NOTE — DISCHARGE INSTRUCTIONS
[facility], [place]  Same-Day BRONCHOSCOPY Procedure (with or without biopsy and/or intervention)  Adult Discharge Orders & Instructions     For 24 hours after BRONCHOSCOPY     1. You may cough up a small amount of blood for a day or two  2. Get plenty of rest.  A responsible adult must stay with you for at least 24 hours after you leave the hospital.   3. Do not drive or use heavy equipment.  If you have weakness or tingling, don't drive or use heavy equipment until this feeling goes away.  4. Do not drink alcohol.  5. Avoid strenuous or risky activities (gym, yoga, cycling, etc.).  Ask for help when climbing stairs.   6. You may feel lightheaded.  IF so, sit for a few minutes before standing.  Have someone help you get up.   7. If you have nausea (feel sick to your stomach): Drink only clear liquids such as apple juice, ginger ale, broth or 7-Up.  Rest may also help.  Be sure to drink enough fluids.  Move to a regular diet as you feel able.  8. You may have a slight fever. Call the doctor if your fever is over 100 F (37.7 C) (taken under the tongue) or lasts longer than 24 hours.  9. You may have a dry mouth, a sore throat, muscle aches or trouble sleeping.  These are normal and should go away after 24 hours.  10. Do not make important or legal decisions.     Call your doctor  for any of the followin. If you begin to cough up bright red blood, or large clots of blood   2. If you become increasing more short of breath or begin to have chest pains  3. Difficulty swallowing or feeling as though food or liquids are getting stuck   4. Sore throat lasting more than 2 days or pain that has worsened over time  5. Nausea and/or vomiting that is not resolving or has not responded to anti-nausea medications if prescribed to you   6. If you experience a fever above 100.5 F (38 C) for more than 24 hours.   7. It has been over 8 to 10 hours since surgery and you are still not able to  urinate (pass water).      To contact a doctor, call:  [ ]Dr. Cordova office @ Same-Day Surgery   Adult Discharge Orders & Instructions     For 24 hours after surgery:  1. Get plenty of rest.  A responsible adult must stay with you for at least 24 hours after you leave the hospital.   2. Pain medication can slow your reflexes. Do not drive or use heavy equipment.  If you have weakness or tingling, don't drive or use heavy equipment until this feeling goes away.  3. Mixing alcohol and pain medication can cause dizziness and slow your breathing. It can even be fatal. Do not drink alcohol while taking pain medication.  4. Avoid strenuous or risky activities.  Ask for help when climbing stairs.   5. You may feel lightheaded.  If so, sit for a few minutes before standing.  Have someone help you get up.   6. If you have nausea (feel sick to your stomach), drink only clear liquids such as apple juice, ginger ale, broth or 7-Up.  Rest may also help.  Be sure to drink enough fluids.  Move to a regular diet as you feel able. Take pain medications with a small amount of solid food, such as toast or crackers, to avoid nausea.   7. A slight fever is normal. Call the doctor if your fever is over 100 F (37.7 C) (taken under the tongue) or lasts longer than 24 hours.  8. You may have a dry mouth, muscle aches, trouble sleeping or a sore throat.  These symptoms should go away after 24 hours.  9. Do not make important or legal decisions.   Pain Management:      1. Take pain medication (if prescribed) for pain as directed by your physician.        2. WARNING: If the pain medication you have been prescribed contains Tylenol  (acetaminophen), DO NOT take additional doses of Tylenol (acetaminophen).     Call your doctor for any of the followin.  Signs of infection (fever, growing tenderness at the surgery site, severe pain, a large amount of drainage or bleeding, foul-smelling drainage, redness, swelling).    2.  It has been over 8  to 10 hours since surgery and you are still not able to urinate (pee).    3.  Headache for over 24 hours.    4.  Numbness, tingling or weakness the day after surgery (if you had spinal anesthesia).  To contact a doctor, call _____________________________________ or:      [phone] and ask for the Resident On Call for:          __________________________________________ (answered 24 hours a day)      Emergency Department:  [place]: [phone]  [place] [facility]: [phone]               Rev. 10/2014 [phone]  (Monday thru Friday 8:00am to 4:30pm)  [ ] [phone] and ask for the PULMONARY MEDICINE resident on call (answered 24 hours a day)  [ ] Emergency Department: [facility]: [phone]    Take it easy when you get home.  Remember, same day surgery DOES NOT MEAN SAME DAY RECOVERY!  Healing is a gradual process.  You will need some time to recover - you may be more tired than you realize at first.  Rest and relax for at least the first 24 hours at home.  You'll feel better and heal faster if you take good care of yourself.

## 2021-04-21 NOTE — OR NURSING
Pt underwent bronch with BAL under conscious sedation. O2 via nasal cannula titrated throughout to maintain adequate O2 sats. Procedure was well tolerated by pt. Specimens sent to lab via RT. Pt transferred to 3C and recovery RN given report.      Mary Stinson RN

## 2021-04-22 ENCOUNTER — TELEPHONE (OUTPATIENT)
Dept: PULMONOLOGY | Facility: CLINIC | Age: 41
End: 2021-04-22

## 2021-04-22 LAB
ASPERGILLUS GALACTOMANNAN ANTIGEN BAL: NEGATIVE
ASPERGILLUS GALACTOMANNAN ANTIGEN BAL: NEGATIVE
CMV DNA SPEC NAA+PROBE-ACNC: NORMAL [IU]/ML
CMV DNA SPEC NAA+PROBE-ACNC: NORMAL [IU]/ML
CMV DNA SPEC NAA+PROBE-LOG#: NORMAL {LOG_IU}/ML
CMV DNA SPEC NAA+PROBE-LOG#: NORMAL {LOG_IU}/ML
FLUAV H1 2009 PAND RNA SPEC QL NAA+PROBE: NEGATIVE
FLUAV H1 2009 PAND RNA SPEC QL NAA+PROBE: NEGATIVE
FLUAV H1 RNA SPEC QL NAA+PROBE: NEGATIVE
FLUAV H1 RNA SPEC QL NAA+PROBE: NEGATIVE
FLUAV H3 RNA SPEC QL NAA+PROBE: NEGATIVE
FLUAV H3 RNA SPEC QL NAA+PROBE: NEGATIVE
FLUAV RNA SPEC QL NAA+PROBE: NEGATIVE
FLUAV RNA SPEC QL NAA+PROBE: NEGATIVE
FLUBV RNA SPEC QL NAA+PROBE: NEGATIVE
FLUBV RNA SPEC QL NAA+PROBE: NEGATIVE
GALACTOMANNAN AG SERPL-ACNC: 0.12
GALACTOMANNAN AG SERPL-ACNC: 0.15
HADV DNA SPEC QL NAA+PROBE: NEGATIVE
HMPV RNA SPEC QL NAA+PROBE: NEGATIVE
HMPV RNA SPEC QL NAA+PROBE: NEGATIVE
HPIV1 RNA SPEC QL NAA+PROBE: NEGATIVE
HPIV1 RNA SPEC QL NAA+PROBE: NEGATIVE
HPIV2 RNA SPEC QL NAA+PROBE: NEGATIVE
HPIV2 RNA SPEC QL NAA+PROBE: NEGATIVE
HPIV3 RNA SPEC QL NAA+PROBE: NEGATIVE
HPIV3 RNA SPEC QL NAA+PROBE: NEGATIVE
MICROBIOLOGIST REVIEW: NORMAL
MICROBIOLOGIST REVIEW: NORMAL
RHINOVIRUS RNA SPEC QL NAA+PROBE: NEGATIVE
RHINOVIRUS RNA SPEC QL NAA+PROBE: NEGATIVE
RSV RNA SPEC QL NAA+PROBE: NEGATIVE
SPECIMEN SOURCE: NORMAL

## 2021-04-22 NOTE — TELEPHONE ENCOUNTER
Pt returned my call. Able to schedule appt with Dr. Nick in ~5 weeks. Spoke with imaging and scheduled CT for just prior and called pt back to confirm appt details. He voiced understanding.

## 2021-04-23 LAB
APPEARANCE FLD: NORMAL
APPEARANCE FLD: NORMAL
COLOR FLD: NORMAL
COLOR FLD: NORMAL
EOSINOPHIL NFR FLD MANUAL: 2 %
LAB SCANNED RESULT: NORMAL
LAB SCANNED RESULT: NORMAL
LYMPHOCYTES NFR FLD MANUAL: 2 %
LYMPHOCYTES NFR FLD MANUAL: 3 %
NEUTS BAND NFR FLD MANUAL: 13 %
NEUTS BAND NFR FLD MANUAL: 4 %
OTHER CELLS FLD MANUAL: 84 %
OTHER CELLS FLD MANUAL: 92 %
SPECIMEN SOURCE FLD: NORMAL
SPECIMEN SOURCE FLD: NORMAL
WBC # FLD AUTO: 224 /UL
WBC # FLD AUTO: 367 /UL

## 2021-04-24 LAB
ACID FAST STN SPEC QL: NORMAL
BACTERIA SPEC CULT: ABNORMAL
BACTERIA SPEC CULT: ABNORMAL
SPECIMEN SOURCE: ABNORMAL
SPECIMEN SOURCE: NORMAL
SPECIMEN SOURCE: NORMAL

## 2021-04-25 LAB
BACTERIA SPEC CULT: ABNORMAL
BACTERIA SPEC CULT: ABNORMAL
SPECIMEN SOURCE: ABNORMAL

## 2021-04-26 LAB — MISCELLANEOUS TEST: NORMAL

## 2021-04-27 ENCOUNTER — IMMUNIZATION (OUTPATIENT)
Dept: NURSING | Facility: CLINIC | Age: 41
End: 2021-04-27
Payer: MEDICARE

## 2021-04-27 PROCEDURE — 91300 PR COVID VAC PFIZER DIL RECON 30 MCG/0.3 ML IM: CPT

## 2021-04-27 PROCEDURE — 0001A PR COVID VAC PFIZER DIL RECON 30 MCG/0.3 ML IM: CPT

## 2021-04-28 ENCOUNTER — VIRTUAL VISIT (OUTPATIENT)
Dept: GASTROENTEROLOGY | Facility: CLINIC | Age: 41
End: 2021-04-28
Payer: COMMERCIAL

## 2021-04-28 VITALS — BODY MASS INDEX: 29.55 KG/M2 | WEIGHT: 195 LBS | HEIGHT: 68 IN

## 2021-04-28 DIAGNOSIS — K21.00 GASTROESOPHAGEAL REFLUX DISEASE WITH ESOPHAGITIS WITHOUT HEMORRHAGE: Primary | ICD-10-CM

## 2021-04-28 PROCEDURE — 99442 PR PHYSICIAN TELEPHONE EVALUATION 11-20 MIN: CPT | Mod: 95 | Performed by: INTERNAL MEDICINE

## 2021-04-28 ASSESSMENT — MIFFLIN-ST. JEOR: SCORE: 1769.01

## 2021-04-28 NOTE — LETTER
4/28/2021         RE: Caesar Richardson  [address] [facility] [address]        Dear Colleague,    Thank you for referring your patient, Caesar Richardson, to the [facility] [place]. Please see a copy of my visit note below.    Caesar Richardson is a 40 year old male who is being evaluated via a billable video visit.      Please call phone number:  [phone]    Caesar Richardson is a 40 year old male who is being evaluated via a telephone visit.      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  Severe acid reflux with regurgitation of gastric contents at night, ongoing despite omeprazole 40 mg po BID.        Recommendations:  Meet with thoracic surgery to discuss options-  LYNX  Follow up with Pulmonary Medicine regarding bronchoscopy results and pulmonary issues.    Continue omeprazole 40 mg po BID until otherwise directed.   Follow up with me PRN.               History of Present Illness:   Caesar Richardson is a 40 year old male with a history of  BMT in 2006 and DLI in 2012  for CML developed post transplant fungal pneumonia, more recently is having multiple symptoms consistent with severe GERD.  The most trouble some symptoms include worse horseness, cough, and LA grade C esophagitis while on omeprazole 40 mg daily.  Direct Laryngoscopy showed intra arytenoid pachydermia.  Biopsies showed granulomatous nodules on vocal folds.    The esophagitis did improve with increase in omeprazole to 40 mg po BID.    However he continues to regularly experience acid regurgitation.     1/25/21  EGD   Impression:          -  Z-line irregular, 35 cm from the incisors.                        - LA Grade C esophagitis. Biopsied.                        - Erythematous mucosa in the stomach. Biopsied.                        - 1 cm hiatal hernia.                        - Normal examined duodenum. Biopsied.                        - Dysphagia likely due to esophagitis. Will increase                        omeprazole to 40 mg PO BID and follow symptoms. Repeat                        EGD in 2-3 months to check for healing of esophagitis                        and ensure no Mitchell's.                        - No stricture seen. No need for dilation given                        esophagitis.  3/25/21  EGD  Findings:        The Z-line was irregular.        The lower third of the esophagus was mildly tortuous.        There is no endoscopic evidence of esophagitis in the lower third of the        esophagus.        A small hiatal hernia was present.        The exam of the stomach was otherwise normal.        The examined duodenum was normal.                                             Normal Esophageal Manometry per [place] 4.0 Classification - however he did have some distal weakness consistent with older criteria for IEM.    High Resolution Esophageal Manometry     Impression:   Normal Esophageal Manometry by [place] 4.0 Classification   Some minor degree of distal weakness, bolus escape, likely reflux related ineffectiveness.           Because of the severity of his cough and the previous history of fungal pneumonia, he had follow up CT which showed similar changes to 2014 (during fungal pneumonia) he underwent bronchoscopy last week and met with Dr. Nick in pulmonary medicine.   CT CHEST 4/13/21  IMPRESSION:    1. Scattered areas of tree-in-bud and nodular groundglass opacities in  the bilateral lower lobes, concerning for atypical infection.  2. Borderline mediastinal lymphadenopathy, likely reactive.  3. Overall unchanged apical predominant  emphysematous changes.  Per my review there is no obvious hiatal hernia there.       Because of this he underwent bronchoscopy: 4/21/21  Findings:        Vocal cords with normal appearance and movement. Normal trachea, becky        and bilateral mainstem bronchi. No endobronchial massess or secretions.        BAL of lateral segment of RLL with 180 ml of saline and return of 35 ml,        BAL of superior segment LLL with 180 ml and return of 45 ml. Returns        from both sites were blood tinged and cloudy.   Impression:           Normal vocal cords, trachea, and mainstem airways. No                         endobronhial massess or secretions. Sucessful BAL of                         lateral segment RLL and superior segment of LLL.     His case was discussed at bi-weekly esophagus med-surg meeting.  He was then scheduled to follow up with Thoracic Surgery to discuss Lynx.    He continues on omeprazole 40 mg BID.   He is waiting to here back from Dr. Nick about his lungs and any treatment that might be necessary.         Past Medical and Surgical History, Social History, Family History - per Epic EMR: REVIEWED         Allergies:   Reviewed and edited as appropriate   No Known Allergies         Medications:     Current Outpatient Medications   Medication Sig Dispense Refill     diclofenac (VOLTAREN) 1 % GEL Apply topically 4 times daily 2 Tube 6     fluticasone-salmeterol (ADVAIR) 250-50 MCG/DOSE diskus inhaler Inhale 1 puff into the lungs 2 times daily 1 Inhaler 12     ibuprofen (ADVIL/MOTRIN) 200 MG tablet Take 200 mg by mouth every 4 hours as needed for mild pain       nicotine (NICODERM CQ) 21 MG/24HR 24 hr patch Place 1 patch onto the skin every 24 hours       omeprazole (PRILOSEC) 40 MG DR capsule Take 1 capsule (40 mg) by mouth 2 times daily (before meals) 60 capsule 3     tadalafil (CIALIS) 10 MG tablet Take 1 tablet (10 mg) by mouth daily as needed (take 1 tablet 30 min prior to sexual intercourse) 10 tablet  "11     traZODone (DESYREL) 150 MG tablet Take 1 tablet (150 mg) by mouth At Bedtime 90 tablet 6             Review of Systems:     Per HPI           Physical Exam:   Ht 1.727 m (5' 8\")   Wt 88.5 kg (195 lb)   BMI 29.65 kg/m    Wt:   Wt Readings from Last 2 Encounters:   04/28/21 88.5 kg (195 lb)   04/21/21 88.3 kg (194 lb 10.7 oz)      Constitutional: cooperative, pleasant, no acute distress hoarse voice        Sascha Gunter MD  Associate Professor of Medicine  Division of Gastroenterology, Hepatology, and Nutrition  [facility]      "

## 2021-04-28 NOTE — NURSING NOTE
"Chief Complaint   Patient presents with     Follow Up       Vitals:    04/28/21 0747   Weight: 88.5 kg (195 lb)   Height: 1.727 m (5' 8\")       Body mass index is 29.65 kg/m .    Lynsey Sifuentes CMA    "

## 2021-04-29 DIAGNOSIS — B49 FUNGAL INFECTION OF LUNG: Primary | ICD-10-CM

## 2021-04-29 RX ORDER — VORICONAZOLE 200 MG/1
TABLET, FILM COATED ORAL
Qty: 90 TABLET | Refills: 1 | Status: SHIPPED | OUTPATIENT
Start: 2021-04-29 | End: 2021-11-05

## 2021-04-29 NOTE — PATIENT INSTRUCTIONS
It was a pleasure taking care of you today.  I've included a brief summary of our discussion and care plan from today's visit below.  Please review this information with your primary care provider.  _______________________________________________________________________    Recommendations:  Meet with thoracic surgery to discuss options-  LYNX  Follow up with Pulmonary Medicine regarding bronchoscopy results and pulmonary issues.    Continue omeprazole 40 mg po BID until otherwise directed.   Follow up with me PRN.          Please call our nurse Raquel/Keila with any questions or concerns - [phone].    Return to GI Clinic as needed to review your progress.    _______________________________________________________________________    Who do I call with any questions after my visit?  Please be in touch if there are any further questions that arise following today's visit.  There are multiple ways to contact your gastroenterology care team.        During business hours, you may reach a Gastroenterology nurse at [phone] and choose option 3.         To schedule or reschedule an appointment, please call [phone].       You can always send a secure message through YouTab.  YouTab messages are answered by your nurse or doctor typically within 24 hours.  Please allow extra time on weekends and holidays.        For urgent/emergent questions after business hours, you may reach the on-call GI Fellow by contacting the [facility] at [phone].     How will I get the results of any tests ordered?    You will receive all of your results.  If you have signed up for YouTab, any tests ordered at your visit will be available to you after your physician reviews them.  Typically this takes 1-2 weeks.  If there are urgent results that require a change in your care plan, your physician or nurse will call you to discuss the next steps.      What is YouTab?  YouTab is a secure way for you to access  all of your healthcare records from the [facility].  It is a web based computer program, so you can sign on to it from any location.  It also allows you to send secure messages to your care team.  I recommend signing up for [employer] access if you have not already done so and are comfortable with using a computer.      How to I schedule a follow-up visit?  If you did not schedule a follow-up visit today, please call [phone] to schedule a follow-up office visit.        Sincerely,    Sascha Gunter MD  [facility]  Division of Gastroenterology

## 2021-04-30 ENCOUNTER — MYC MEDICAL ADVICE (OUTPATIENT)
Dept: PULMONOLOGY | Facility: CLINIC | Age: 41
End: 2021-04-30

## 2021-04-30 NOTE — LETTER
May 4, 2021       Re: Caesar Richardson  [address] [facility] [address]         To whom it may concern,      A bronchoscopy was performed with culture results showing Penicillium.  Based on my extensive clinical experience, his findings on Chest CT are consistent with a fungal infection and cultures are growing penicillium.  He has had fungal pneumonia in the past.  Consultation with Infectious Disease is not indicated in a patient with a positive bronchoscopy culture and abnormal CT imaging.  Obtaining an Infectious Disease consult will only delay treatment in this immunocompromised patient with a positive fungal culture wherein treatment needs to be initiated promptly.      Sincerely,      Brenden Nick MD

## 2021-05-03 NOTE — TELEPHONE ENCOUNTER
Central Prior Authorization Team   [phone]    PA Initiation    Medication: voriconazole (VFEND) 200 MG tablet  Insurance Company: Comment:  [employer] [phone]  Pharmacy Filling the Rx: [facility] [place] - [address] [place] [address]  Filling Pharmacy Phone: [phone]  Filling Pharmacy Fax: [phone]  Start Date: 5/3/2021

## 2021-05-03 NOTE — TELEPHONE ENCOUNTER
Prior Authorization Retail Medication Request    Medication/Dose: Vfend (Voriconazole)  ICD code (if different than what is on RX):    Previously Tried and Failed:    Rationale:      Insurance Name:    Insurance ID:        Pharmacy Information (if different than what is on RX)  Name:    Phone:

## 2021-05-04 NOTE — TELEPHONE ENCOUNTER
Sent attached letter from providers (Brenden Nick MD)  response on additional questions requested by insurance via fax. Marked Request as Urgent and awaiting on a outcome via fax.

## 2021-05-04 NOTE — TELEPHONE ENCOUNTER
PA Pending. Insurance requesting additional information on medication as noted below. Please provide information/documentation on request.

## 2021-05-05 ENCOUNTER — OFFICE VISIT (OUTPATIENT)
Dept: SURGERY | Facility: CLINIC | Age: 41
End: 2021-05-05
Attending: CLINICAL NURSE SPECIALIST
Payer: COMMERCIAL

## 2021-05-05 ENCOUNTER — PREP FOR PROCEDURE (OUTPATIENT)
Dept: SURGERY | Facility: CLINIC | Age: 41
End: 2021-05-05

## 2021-05-05 VITALS
RESPIRATION RATE: 18 BRPM | OXYGEN SATURATION: 95 % | HEART RATE: 76 BPM | BODY MASS INDEX: 30.09 KG/M2 | SYSTOLIC BLOOD PRESSURE: 114 MMHG | DIASTOLIC BLOOD PRESSURE: 69 MMHG | WEIGHT: 197.9 LBS

## 2021-05-05 DIAGNOSIS — K21.01 GASTROESOPHAGEAL REFLUX DISEASE WITH ESOPHAGITIS AND HEMORRHAGE: ICD-10-CM

## 2021-05-05 DIAGNOSIS — Z01.818 PREOPERATIVE EXAMINATION: ICD-10-CM

## 2021-05-05 DIAGNOSIS — K20.90 ESOPHAGITIS: Primary | ICD-10-CM

## 2021-05-05 DIAGNOSIS — K20.80 ESOPHAGITIS, LOS ANGELES GRADE C: Primary | ICD-10-CM

## 2021-05-05 PROCEDURE — 999N001193 HC VIDEO/TELEPHONE VISIT; NO CHARGE

## 2021-05-05 PROCEDURE — 99205 OFFICE O/P NEW HI 60 MIN: CPT | Performed by: THORACIC SURGERY (CARDIOTHORACIC VASCULAR SURGERY)

## 2021-05-05 RX ORDER — CEFAZOLIN SODIUM 2 G/50ML
2 SOLUTION INTRAVENOUS SEE ADMIN INSTRUCTIONS
Status: CANCELLED | OUTPATIENT
Start: 2021-05-05

## 2021-05-05 RX ORDER — ACETAMINOPHEN 325 MG/1
975 TABLET ORAL ONCE
Status: CANCELLED | OUTPATIENT
Start: 2021-05-05 | End: 2021-05-05

## 2021-05-05 RX ORDER — CEFAZOLIN SODIUM 2 G/50ML
2 SOLUTION INTRAVENOUS
Status: CANCELLED | OUTPATIENT
Start: 2021-05-05

## 2021-05-05 ASSESSMENT — PAIN SCALES - GENERAL: PAINLEVEL: NO PAIN (0)

## 2021-05-05 NOTE — PROGRESS NOTES
THORACIC SURGERY - NEW PATIENT OFFICE VISIT      Dear Dr. Leon,    I saw Caesar Richardson at Dr. Gunter s request in consultation for the evaluation and treatment of GERD, paraesophageal hernia and esophagitis.     HPI  Mr. Caesar Richardson is a 40 year old man who presented with intractable reflux despite maximal medical therapy. His main symptoms are heartburn regurgitation and significant nocturnal aspiration. He is a patient of Dr Gunter who referred him for consideration of antireflux surgery, following discussion in multidisciplinary conference.                                   Previsit Tests   EGD (1/25/21):  LA Grade C esophagitis, 1 cm hiatal hernia.         EGD (3/26/21):  lower third of the esophagus mildly tortuous, no endoscopic evidence of esophagitis in the lower third of the esophagus, small hiatal hernia. No Mitchell's.          High Resolution Manometry (4/1/2021):  Normal Esophageal Manometry by [place] 4.0 Classification, some minor degree of distal weakness, bolus escape, likely reflux related ineffectiveness.             PFT (4/13/2021): FEV1- 76%, DLCO- 97%    PMH  Reviewed as below:  Past Medical History:   Diagnosis Date     Arthritis      BMT (bone marrow transplant) 2006     CML (chronic myelocytic leukemia) (H)      COPD (chronic obstructive pulmonary disease) (H) 1/9/2013     GERD (gastroesophageal reflux disease)      Ziobe-ngpsvl-exxs disease (H) 7/21/2011     Problem list name updated by automated process. Provider to review     Rheumatoid arthritis (H) 7/21/2011     Problem list name updated by automated process. Provider to review        PSH  Reviewed as below:  Past Surgical History:   Procedure Laterality Date     ARTHROPLASTY KNEE UNICOMPARTMENT BILATERAL Bilateral 3/1/2018    Procedure: ARTHROPLASTY KNEE UNICOMPARTMENT BILATERAL;  Bilateral Knee Uni Compartmental Arthroplasty;  Surgeon: Pradeep Turner MD;  Location: [facility] OR     BMT CELL PRODUCT INFUSION  2012     BRONCHOSCOPY  (RIGID OR FLEXIBLE), DIAGNOSTIC N/A 4/21/2021    Procedure: BRONCHOSCOPY, WITH BRONCHOALVEOLAR LAVAGE;  Surgeon: Brenden Nick MD;  Location:  GI     ESOPHAGOSCOPY, GASTROSCOPY, DUODENOSCOPY (EGD), COMBINED N/A 1/25/2021    Procedure: ESOPHAGOGASTRODUODENOSCOPY, WITH BIOPSY;  Surgeon: Meño Murphy MD;  Location: [facility] OR     ESOPHAGOSCOPY, GASTROSCOPY, DUODENOSCOPY (EGD), COMBINED N/A 3/26/2021    Procedure: ESOPHAGOGASTRODUODENOSCOPY (EGD);  Surgeon: Jose Carlos Guzman MD;  Location: [facility] OR     LARYNGOSCOPY WITH BIOPSY(IES) N/A 2/23/2021    Procedure: LARYNGOSCOPY, WITH BIOPSY;  Surgeon: Santo Sigala MD;  Location:  OR     ORTHOPEDIC SURGERY  2007     TRANSPLANT  2006    BMT        No Known Allergies    Current Outpatient Medications   Medication     diclofenac (VOLTAREN) 1 % GEL     fluticasone-salmeterol (ADVAIR) 250-50 MCG/DOSE diskus inhaler     ibuprofen (ADVIL/MOTRIN) 200 MG tablet     nicotine (NICODERM CQ) 21 MG/24HR 24 hr patch     omeprazole (PRILOSEC) 40 MG DR capsule     tadalafil (CIALIS) 10 MG tablet     traZODone (DESYREL) 150 MG tablet     voriconazole (VFEND) 200 MG tablet     No current facility-administered medications for this visit.        ETOH Once a month.   TOB Quit 7 weeks ago.     Physical examination  /69   Pulse 76   Resp 18   Wt 89.8 kg (197 lb 14.4 oz)   SpO2 95%   BMI 30.09 kg/m    BMI 29  Bilateral breath sounds  Abd soft benign ND NT. No abd scars.     From a personal perspective, he drives a truck for a living.     IMPRESSION   40 year old male with GERD, paraesophageal hernia and esophagitis.  I had an extensive discussion with the patient regarding the rationale for surgery, the alternatives risks and benefits of the procedure. We talked about the pros and cons of fundoplication vs MSA. He expressed his desire for a LINX since he does not want to be unable to belch or vomit. I explained the expected hospital stay, postoperative  course, recovery time, diet and activity restrictions. Informed consent was obtained and they agreed to proceed.      PLAN  I spent a total of 60 minutes with Caesar Richardson reviewing his chart, laboratory and imaging studies. . I reviewed the plan as follows:  Procedure planned: Lap paraesophageal hernia repair with magnetic sphincter augmentation.   Necessary Preop Tests & Appointments: PAC.  Anticoagulation: Enoxaparin postop.   Smoking Cessation: He was encouraged to continue to be abstinent from smoking. Mr. Richardson was counseled on the importance of smoking cessation and its impact on the mandeep-operative course. This guideline is in accordance with the World Health Organization (WHO) and has shown to lower the risk of both surgical and anesthesia complications as well as improve post-operative outcomes.     All questions were answered and Caesar Richardson and present family were in agreement with the plan.  I appreciate the opportunity to participate in the care of your patient and will keep you updated.  Sincerely,    LOBO ROBB MD

## 2021-05-05 NOTE — TELEPHONE ENCOUNTER
Prior Authorization Approval    Authorization Effective Date: 5/5/2021  Authorization Expiration Date: 8/4/2021  Medication: voriconazole (VFEND) 200 MG tablet-PA APPROVED   Approved Dose/Quantity:   Reference #:     Insurance Company: Comment:  [employer] [phone]  Expected CoPay: $50       CoPay Card Available:      Foundation Assistance Needed:    Which Pharmacy is filling the prescription (Not needed for infusion/clinic administered): [facility] [place] [address] [place] [address]  Pharmacy Notified: Yes- Pharmacy stated that they have a paid claim on medication and placed an order for medication which will be available on 5/6/2021. **Instructed pharmacy to notify patient when script is ready to /ship.**  Patient Notified: Yes-Called patient and notify patient on PA Approval and when medication would be available for patient to  at pharmacy.

## 2021-05-05 NOTE — NURSING NOTE
"Oncology Rooming Note    May 5, 2021 4:34 PM   Caesar Richardson is a 40 year old male who presents for:    Chief Complaint   Patient presents with     Oncology Clinic Visit     Pt is here for Reflux Disease     Initial Vitals: Blood Pressure 114/69   Pulse 76   Respiration 18   Weight 89.8 kg (197 lb 14.4 oz)   Oxygen Saturation 95%   Body Mass Index 30.09 kg/m   Estimated body mass index is 30.09 kg/m  as calculated from the following:    Height as of 4/28/21: 1.727 m (5' 8\").    Weight as of this encounter: 89.8 kg (197 lb 14.4 oz). Body surface area is 2.08 meters squared.  No Pain (0) Comment: Data Unavailable   No LMP for male patient.  Allergies reviewed: Yes  Medications reviewed: Yes    Medications: Medication refills not needed today.  Pharmacy name entered into EPIC:    [facility]  [facility], [place] - [address]    Clinical concerns: none       Lucie Van MA            "

## 2021-05-05 NOTE — LETTER
5/5/2021         RE: Caesar Richardson  [address] [facility] [address]        Dear Colleague,    Thank you for referring your patient, Caesar Richardson, to the [facility]. Please see a copy of my visit note below.    THORACIC SURGERY - NEW PATIENT OFFICE VISIT      Dear Dr. Leon,    I saw Caesar Richardson at Dr. Gunter s request in consultation for the evaluation and treatment of GERD, paraesophageal hernia and esophagitis.     HPI  Mr. Caesar Richardson is a 40 year old man who presented with intractable reflux despite maximal medical therapy. His main symptoms are heartburn regurgitation and significant nocturnal aspiration. He is a patient of Dr Gunter who referred him for consideration of antireflux surgery, following discussion in multidisciplinary conference.                                   Previsit Tests   EGD (1/25/21):  LA Grade C esophagitis, 1 cm hiatal hernia.         EGD (3/26/21):  lower third of the esophagus mildly tortuous, no endoscopic evidence of esophagitis in the lower third of the esophagus, small hiatal hernia. No Mitchell's.          High Resolution Manometry (4/1/2021):  Normal Esophageal Manometry by [place] 4.0 Classification, some minor degree of distal weakness, bolus escape, likely reflux related ineffectiveness.             PFT (4/13/2021): FEV1- 76%, DLCO- 97%    PMH  Reviewed as below:  Past Medical History:   Diagnosis Date     Arthritis      BMT (bone marrow transplant) 2006     CML (chronic myelocytic leukemia) (H)      COPD (chronic obstructive pulmonary disease) (H) 1/9/2013     GERD (gastroesophageal reflux disease)      Jqddq-biqerq-sgca disease (H) 7/21/2011     Problem list name updated by automated process. Provider to review     Rheumatoid arthritis (H) 7/21/2011     Problem list name updated by automated process. Provider to review        PSH  Reviewed as below:  Past Surgical History:   Procedure Laterality Date     ARTHROPLASTY KNEE  UNICOMPARTMENT BILATERAL Bilateral 3/1/2018    Procedure: ARTHROPLASTY KNEE UNICOMPARTMENT BILATERAL;  Bilateral Knee Uni Compartmental Arthroplasty;  Surgeon: Pradeep Turner MD;  [facility]     BMT CELL PRODUCT INFUSION  2012     BRONCHOSCOPY (RIGID OR FLEXIBLE), DIAGNOSTIC N/A 4/21/2021    Procedure: BRONCHOSCOPY, WITH BRONCHOALVEOLAR LAVAGE;  Surgeon: Brenden Nick MD;  Location: [facility] GI     ESOPHAGOSCOPY, GASTROSCOPY, DUODENOSCOPY (EGD), COMBINED N/A 1/25/2021    Procedure: ESOPHAGOGASTRODUODENOSCOPY, WITH BIOPSY;  Surgeon: Meño Murphy MD;  Location: [facility] OR     ESOPHAGOSCOPY, GASTROSCOPY, DUODENOSCOPY (EGD), COMBINED N/A 3/26/2021    Procedure: ESOPHAGOGASTRODUODENOSCOPY (EGD);  Surgeon: Jose Carlos Guzman MD;  Location: [facility] OR     LARYNGOSCOPY WITH BIOPSY(IES) N/A 2/23/2021    Procedure: LARYNGOSCOPY, WITH BIOPSY;  Surgeon: Santo Sigala MD;  Location: [facility] OR     ORTHOPEDIC SURGERY  2007     TRANSPLANT  2006    BMT        No Known Allergies    Current Outpatient Medications   Medication     diclofenac (VOLTAREN) 1 % GEL     fluticasone-salmeterol (ADVAIR) 250-50 MCG/DOSE diskus inhaler     ibuprofen (ADVIL/MOTRIN) 200 MG tablet     nicotine (NICODERM CQ) 21 MG/24HR 24 hr patch     omeprazole (PRILOSEC) 40 MG DR capsule     tadalafil (CIALIS) 10 MG tablet     traZODone (DESYREL) 150 MG tablet     voriconazole (VFEND) 200 MG tablet     No current facility-administered medications for this visit.        ETOH Once a month.   TOB Quit 7 weeks ago.     Physical examination  /69   Pulse 76   Resp 18   Wt 89.8 kg (197 lb 14.4 oz)   SpO2 95%   BMI 30.09 kg/m    BMI 29  Bilateral breath sounds  Abd soft benign ND NT. No abd scars.     From a personal perspective, he drives a truck for a living.     IMPRESSION   40 year old male with GERD, paraesophageal hernia and esophagitis.  I had an extensive discussion with the patient regarding the rationale for surgery, the  alternatives risks and benefits of the procedure. We talked about the pros and cons of fundoplication vs MSA. He expressed his desire for a LINX since he does not want to be unable to belch or vomit. I explained the expected hospital stay, postoperative course, recovery time, diet and activity restrictions. Informed consent was obtained and they agreed to proceed.      PLAN  I spent a total of 60 minutes with Caesar Richardson reviewing his chart, laboratory and imaging studies. . I reviewed the plan as follows:  Procedure planned: Lap paraesophageal hernia repair with magnetic sphincter augmentation.   Necessary Preop Tests & Appointments: PAC.  Anticoagulation: Enoxaparin postop.   Smoking Cessation: He was encouraged to continue to be abstinent from smoking. Mr. Richardson was counseled on the importance of smoking cessation and its impact on the mandeep-operative course. This guideline is in accordance with the World Health Organization (WHO) and has shown to lower the risk of both surgical and anesthesia complications as well as improve post-operative outcomes.     All questions were answered and Caesar Richardson and present family were in agreement with the plan.  I appreciate the opportunity to participate in the care of your patient and will keep you updated.  Sincerely,    LOBO ROBB MD                   Again, thank you for allowing me to participate in the care of your patient.        Sincerely,        Trip Mcintosh MD

## 2021-05-07 PROBLEM — K20.90 ESOPHAGITIS: Status: ACTIVE | Noted: 2021-05-07

## 2021-05-10 ENCOUNTER — TELEPHONE (OUTPATIENT)
Dept: SURGERY | Facility: CLINIC | Age: 41
End: 2021-05-10

## 2021-05-10 NOTE — TELEPHONE ENCOUNTER
FUTURE VISIT INFORMATION      SURGERY INFORMATION:    Date: 21    Location: [facility] OR    Surgeon:  Yumi Chin MD    Anesthesia Type:  General    Procedure: HERNIORRHAPHY, HIATAL, LAPAROSCOPIC, MAGNETIC SPHINCTER  AUGMENTATION    Consult: ov     RECORDS REQUESTED FROM:       Primary Care Provider: Sebas Leon MD- [facility]    Pertinent Medical History: copd    Most recent EKG+ Tracin13    Most recent PFT's: 21

## 2021-05-10 NOTE — TELEPHONE ENCOUNTER
Spoke with patient to schedule procedure with Dr. Yumi Dominique   Procedure was scheduled on 06/04 at [facility] OR  Patient will have H&P with PAC 06/02    Patient is aware a COVID-19 test is needed before their procedure. The test should be with-in 4 days of their procedure.   Test Details: Date 06/02 Location [facility]    Patient is aware a / is needed day of surgery.   Surgery letter was sent via [employer], patient has my direct contact information for any further questions.

## 2021-05-18 ENCOUNTER — IMMUNIZATION (OUTPATIENT)
Dept: NURSING | Facility: CLINIC | Age: 41
End: 2021-05-18
Attending: INTERNAL MEDICINE
Payer: MEDICARE

## 2021-05-18 PROCEDURE — 91300 PR COVID VAC PFIZER DIL RECON 30 MCG/0.3 ML IM: CPT

## 2021-05-18 PROCEDURE — 0002A PR COVID VAC PFIZER DIL RECON 30 MCG/0.3 ML IM: CPT

## 2021-05-19 DIAGNOSIS — Z11.59 ENCOUNTER FOR SCREENING FOR OTHER VIRAL DISEASES: ICD-10-CM

## 2021-05-19 LAB
BACTERIA SPEC CULT: NORMAL
BACTERIA SPEC CULT: NORMAL
FUNGUS SPEC CULT: ABNORMAL
FUNGUS SPEC CULT: ABNORMAL
SPECIMEN SOURCE: ABNORMAL
SPECIMEN SOURCE: NORMAL
SPECIMEN SOURCE: NORMAL

## 2021-05-25 LAB
FUNGUS SPEC CULT: ABNORMAL
FUNGUS SPEC CULT: ABNORMAL
SPECIMEN SOURCE: ABNORMAL

## 2021-05-27 ENCOUNTER — RECORDS - HEALTHEAST (OUTPATIENT)
Dept: ADMINISTRATIVE | Facility: CLINIC | Age: 41
End: 2021-05-27

## 2021-06-02 ENCOUNTER — APPOINTMENT (OUTPATIENT)
Dept: LAB | Facility: CLINIC | Age: 41
End: 2021-06-02
Payer: COMMERCIAL

## 2021-06-02 ENCOUNTER — PRE VISIT (OUTPATIENT)
Dept: SURGERY | Facility: CLINIC | Age: 41
End: 2021-06-02

## 2021-06-02 ENCOUNTER — ANESTHESIA EVENT (OUTPATIENT)
Dept: SURGERY | Facility: CLINIC | Age: 41
End: 2021-06-02
Payer: COMMERCIAL

## 2021-06-02 ENCOUNTER — OFFICE VISIT (OUTPATIENT)
Dept: SURGERY | Facility: CLINIC | Age: 41
End: 2021-06-02
Payer: COMMERCIAL

## 2021-06-02 VITALS
TEMPERATURE: 98.7 F | SYSTOLIC BLOOD PRESSURE: 134 MMHG | OXYGEN SATURATION: 97 % | HEART RATE: 114 BPM | DIASTOLIC BLOOD PRESSURE: 77 MMHG | BODY MASS INDEX: 30.01 KG/M2 | WEIGHT: 198 LBS | HEIGHT: 68 IN | RESPIRATION RATE: 16 BRPM

## 2021-06-02 DIAGNOSIS — Z01.818 PREOP EXAMINATION: Primary | ICD-10-CM

## 2021-06-02 DIAGNOSIS — K44.9 HIATAL HERNIA: ICD-10-CM

## 2021-06-02 DIAGNOSIS — Z11.59 ENCOUNTER FOR SCREENING FOR OTHER VIRAL DISEASES: ICD-10-CM

## 2021-06-02 LAB
ANION GAP SERPL CALCULATED.3IONS-SCNC: 7 MMOL/L (ref 3–14)
BUN SERPL-MCNC: 15 MG/DL (ref 7–30)
CALCIUM SERPL-MCNC: 9.8 MG/DL (ref 8.5–10.1)
CHLORIDE SERPL-SCNC: 99 MMOL/L (ref 94–109)
CO2 SERPL-SCNC: 28 MMOL/L (ref 20–32)
CREAT SERPL-MCNC: 1.08 MG/DL (ref 0.66–1.25)
ERYTHROCYTE [DISTWIDTH] IN BLOOD BY AUTOMATED COUNT: 14.3 % (ref 10–15)
GFR SERPL CREATININE-BSD FRML MDRD: 85 ML/MIN/{1.73_M2}
GLUCOSE SERPL-MCNC: 146 MG/DL (ref 70–99)
HCT VFR BLD AUTO: 43.8 % (ref 40–53)
HGB BLD-MCNC: 14.6 G/DL (ref 13.3–17.7)
LABORATORY COMMENT REPORT: NORMAL
MCH RBC QN AUTO: 29.1 PG (ref 26.5–33)
MCHC RBC AUTO-ENTMCNC: 33.3 G/DL (ref 31.5–36.5)
MCV RBC AUTO: 87 FL (ref 78–100)
PLATELET # BLD AUTO: 282 10E9/L (ref 150–450)
POTASSIUM SERPL-SCNC: 4 MMOL/L (ref 3.4–5.3)
RBC # BLD AUTO: 5.02 10E12/L (ref 4.4–5.9)
SARS-COV-2 RNA RESP QL NAA+PROBE: NEGATIVE
SARS-COV-2 RNA RESP QL NAA+PROBE: NORMAL
SODIUM SERPL-SCNC: 134 MMOL/L (ref 133–144)
SPECIMEN SOURCE: NORMAL
SPECIMEN SOURCE: NORMAL
WBC # BLD AUTO: 11.2 10E9/L (ref 4–11)

## 2021-06-02 PROCEDURE — U0003 INFECTIOUS AGENT DETECTION BY NUCLEIC ACID (DNA OR RNA); SEVERE ACUTE RESPIRATORY SYNDROME CORONAVIRUS 2 (SARS-COV-2) (CORONAVIRUS DISEASE [COVID-19]), AMPLIFIED PROBE TECHNIQUE, MAKING USE OF HIGH THROUGHPUT TECHNOLOGIES AS DESCRIBED BY CMS-2020-01-R: HCPCS | Mod: 90 | Performed by: PATHOLOGY

## 2021-06-02 PROCEDURE — 86900 BLOOD TYPING SEROLOGIC ABO: CPT | Performed by: PATHOLOGY

## 2021-06-02 PROCEDURE — 80048 BASIC METABOLIC PNL TOTAL CA: CPT | Performed by: PATHOLOGY

## 2021-06-02 PROCEDURE — 99203 OFFICE O/P NEW LOW 30 MIN: CPT | Performed by: CLINICAL NURSE SPECIALIST

## 2021-06-02 PROCEDURE — 86850 RBC ANTIBODY SCREEN: CPT | Performed by: PATHOLOGY

## 2021-06-02 PROCEDURE — U0005 INFEC AGEN DETEC AMPLI PROBE: HCPCS | Mod: 90 | Performed by: PATHOLOGY

## 2021-06-02 PROCEDURE — 86901 BLOOD TYPING SEROLOGIC RH(D): CPT | Performed by: PATHOLOGY

## 2021-06-02 PROCEDURE — 85027 COMPLETE CBC AUTOMATED: CPT | Performed by: PATHOLOGY

## 2021-06-02 PROCEDURE — 93000 ELECTROCARDIOGRAM COMPLETE: CPT | Performed by: INTERNAL MEDICINE

## 2021-06-02 PROCEDURE — 36415 COLL VENOUS BLD VENIPUNCTURE: CPT | Performed by: PATHOLOGY

## 2021-06-02 ASSESSMENT — PAIN SCALES - GENERAL: PAINLEVEL: MILD PAIN (2)

## 2021-06-02 ASSESSMENT — MIFFLIN-ST. JEOR: SCORE: 1782.62

## 2021-06-02 ASSESSMENT — COPD QUESTIONNAIRES: COPD: 1

## 2021-06-02 ASSESSMENT — LIFESTYLE VARIABLES: TOBACCO_USE: 1

## 2021-06-02 NOTE — PATIENT INSTRUCTIONS
Preparing for Your Surgery      Name:  Caesar Richardson   MRN:  [id number]   :  1980   Today's Date:  2021       Arriving for surgery:  Surgery date:  21  Arrival time:  11:15AM    Restrictions due to COVID 19:  One consistent visitor per patient is allowed.  The visitor will be allowed in the pre-op area.  Visitors are asked to leave the building during the surgery.  No ill visitors.  All visitors must wear face mask.    Fab parking is available for anyone with mobility limitations or disabilities.  ([facility] [employer]  24 hours/ 7 days a week; [facility]  7 am- 3:30 pm, Mon- Fri)    Please come to:     [facility] [address]  [address] [place] [address]    When entering the hospital you will be asked COVID screening questions, you will then be directed to registration.  Registration will direct you to the correct lobby to wait until escorted to the preop area. Preop number- [phone]     What can I eat or drink?  -  You may eat and drink normally for up to 8 hours before your surgery. (Until 21, 5:15AM)  -  You may have clear liquids until 2 hours before surgery. (Until 21, 11:15AM)    Examples of clear liquids:  Water  Clear broth  Juices (apple, white grape, white cranberry  and cider) without pulp  Noncarbonated, powder based beverages  (lemonade and Kalen-Aid)  Sodas (Sprite, 7-Up, ginger ale and seltzer)  Coffee or tea (without milk or cream)  Gatorade    -  No Alcohol for at least 24 hours before surgery     Which medicines can I take?    Hold Aspirin for 7 days before surgery.   Hold Multivitamins for 7 days before surgery.  Hold Supplements for 7 days before surgery.       Hold Tadalafil(Cialis) for 3 days prior to surgery.  Hold Diclofenac(Voltaren) gel for 24 hours.   Hold Ibuprofen (Advil, Motrin) for 1 day before surgery--unless otherwise directed by surgeon.  Hold Naproxen (Aleve) for 4 days before surgery.    -  DO  NOT take these medications the day of surgery:    Nicoderm patch    -  PLEASE TAKE these medications the day of surgery:    Advair Inhaler    Omeprazole(Prilosec)    Voriconazole(VFend)    How do I prepare myself?  - Please take 2 showers before surgery using Scrubcare or Hibiclens soap.    Use this soap only from the neck to your toes.     Leave the soap on your skin for one minute--then rinse thoroughly.      You may use your own shampoo and conditioner; no other hair products.   - Please remove all jewelry and body piercings.  - No lotions, deodorants or fragrance.  - Bring your ID and insurance card.    - All patients are required to have a Covid-19 test within 4 days of surgery/procedure.      -Patients will be contacted by the [facility] scheduling team within 1 week of surgery to make an appointment.      - Patients may call the Scheduling team at [phone] if they have not been scheduled within 4 days of  surgery.      ALL PATIENTS GOING HOME THE SAME DAY OF SURGERY ARE REQUIRED TO HAVE A RESPONSIBLE ADULT TO DRIVE AND BE IN ATTENDANCE WITH THEM FOR 24 HOURS FOLLOWING SURGERY.    IF THE RESPONSIBLE ADULT IS REQUIRED FOR POST OP TEACHING THE POST OP RN WILL ASK THEM TO COME BACK TO THE RECOVERY AREA.    Questions or Concerns:    - For any questions regarding the day of surgery or your hospital stay, please contact the Pre Admission Nursing Office at [phone].       - If you have health changes between today and your surgery please call your surgeon.       For questions after surgery please call your surgeons office.

## 2021-06-02 NOTE — H&P
Pre-Operative H & P     CC:  Preoperative exam to assess for increased cardiopulmonary risk while undergoing surgery and anesthesia.    Date of Encounter: 6/2/2021  Primary Care Physician:  Sebas Leon  Reason for visit:Esophagitis [K20.90]    HPI  Caesar Richardson is a 40 year old male who presents for pre-operative H & P in preparation for HERNIORRHAPHY, HIATAL, LAPAROSCOPIC, MAGNETIC SPHINCTER AUGMENTATION with Dr. Arnaldo Dominique on 6/4/21 at [facility]. History is obtained from the patient and medical records.     Patient who was recently referred to Dr. Arnaldo Dominique for symptoms of GERD and esophagitis with known paraesophageal hernia. His most significant symptoms are heartburn, regurgitation, and significant nocturnal aspiration. He has exhausted maximal medical therapy. His imaging was reviewed and he was counseled for above procedure.     Patient's history is otherwise complex with smoking, COPD, RA, chronic myelogenous leukemia with previous total body radiation s/p allo HCT 1/31/2006, and DLI in 6/13/12, with remission achieved. He has chronic, stable GVHD. Continues to follow with Hematology/Oncology, last seen 2/17/21. He is also followed by Pulmonary Dr. Nick, last seen in 4/2020. Concern for mold in his lungs and is now on voriconazole.     Today patient denies fever, cough, chest pain, irregular HR, or ankle edema. He quit smoking 11 weeks ago but states that he still doesn't feel much different. He has DYSPNEA ON EXERTION after climbing stairs. He is using Advair twice daily.     Past Medical History  Past Medical History:   Diagnosis Date     Arthritis      BMT (bone marrow transplant) 2006     CML (chronic myelocytic leukemia) (H)      COPD (chronic obstructive pulmonary disease) (H) 01/09/2013     GERD (gastroesophageal reflux disease)      Blkym-bqwthz-buzu disease (H) 07/21/2011     Hiatal hernia      Rheumatoid arthritis (H)  07/21/2011       Past Surgical History  Past Surgical History:   Procedure Laterality Date     ARTHROPLASTY KNEE UNICOMPARTMENT BILATERAL Bilateral 3/1/2018    Procedure: ARTHROPLASTY KNEE UNICOMPARTMENT BILATERAL;  Bilateral Knee Uni Compartmental Arthroplasty;  Surgeon: Pradeep Turner MD;  Location: [facility] OR     BMT CELL PRODUCT INFUSION  2012     BRONCHOSCOPY (RIGID OR FLEXIBLE), DIAGNOSTIC N/A 4/21/2021    Procedure: BRONCHOSCOPY, WITH BRONCHOALVEOLAR LAVAGE;  Surgeon: Brenden Nick MD;  Location: [facility] GI     ESOPHAGOSCOPY, GASTROSCOPY, DUODENOSCOPY (EGD), COMBINED N/A 1/25/2021    Procedure: ESOPHAGOGASTRODUODENOSCOPY, WITH BIOPSY;  Surgeon: Meño Murphy MD;  Location: [facility] OR     ESOPHAGOSCOPY, GASTROSCOPY, DUODENOSCOPY (EGD), COMBINED N/A 3/26/2021    Procedure: ESOPHAGOGASTRODUODENOSCOPY (EGD);  Surgeon: Jose Carlos Guzman MD;  Location: [facility] OR     LARYNGOSCOPY WITH BIOPSY(IES) N/A 2/23/2021    Procedure: LARYNGOSCOPY, WITH BIOPSY;  Surgeon: Santo Sigala MD;  Location: [facility] OR     ORTHOPEDIC SURGERY  2007     TRANSPLANT  2006    BMT       Hx of Blood transfusions/reactions: Yes in past. No known reactions. Irradiated units required.    Hx of abnormal bleeding or anti-platelet use: Denies.     Menstrual history: No LMP for male patient.    Steroid use in the last year: Denies.     Personal or FH with difficulty with Anesthesia:  Denies.     Prior to Admission Medications  Current Outpatient Medications   Medication Sig Dispense Refill     diclofenac (VOLTAREN) 1 % GEL Apply topically 4 times daily 2 Tube 6     fluticasone-salmeterol (ADVAIR) 250-50 MCG/DOSE diskus inhaler Inhale 1 puff into the lungs 2 times daily (Patient taking differently: Inhale 1 puff into the lungs every morning ) 1 Inhaler 12     omeprazole (PRILOSEC) 40 MG DR capsule Take 1 capsule (40 mg) by mouth 2 times daily (before meals) 60 capsule 3     tadalafil (CIALIS) 10 MG tablet Take 1 tablet (10 mg) by mouth  daily as needed (take 1 tablet 30 min prior to sexual intercourse) 10 tablet 11     traZODone (DESYREL) 150 MG tablet Take 1 tablet (150 mg) by mouth At Bedtime (Patient taking differently: Take 150 mg by mouth as needed ) 90 tablet 6     voriconazole (VFEND) 200 MG tablet Take 300 mg twice a day (Patient taking differently: 2 times daily Take 300 mg twice a day) 90 tablet 1     ibuprofen (ADVIL/MOTRIN) 200 MG tablet Take 200 mg by mouth every 4 hours as needed for mild pain        nicotine (NICODERM CQ) 21 MG/24HR 24 hr patch Place 1 patch onto the skin every 24 hours          Allergies  No Known Allergies    Social History  Social History     Socioeconomic History     Marital status:      Spouse name: Not on file     Number of children: 0     Years of education: 12     Highest education level: Not on file   Occupational History     Occupation: lawn care     Employer: [employer]   Social Needs     Financial resource strain: Not on file     Food insecurity     Worry: Not on file     Inability: Not on file     Transportation needs     Medical: Not on file     Non-medical: Not on file   Tobacco Use     Smoking status: Former Smoker     Packs/day: 1.00     Years: 25.00     Pack years: 25.00     Types: Cigarettes     Quit date: 3/5/2021     Years since quittin.2     Smokeless tobacco: Never Used     Tobacco comment: down to few cigs per week   Substance and Sexual Activity     Alcohol use: No     Alcohol/week: 1.7 standard drinks     Drug use: No     Sexual activity: Not on file   Lifestyle     Physical activity     Days per week: Not on file     Minutes per session: Not on file     Stress: Not on file   Relationships     Social connections     Talks on phone: Not on file     Gets together: Not on file     Attends Yarsanism service: Not on file     Active member of club or organization: Not on file     Attends meetings of clubs or organizations: Not on file     Relationship status: Not on file      Intimate partner violence     Fear of current or ex partner: Not on file     Emotionally abused: Not on file     Physically abused: Not on file     Forced sexual activity: Not on file   Other Topics Concern      Service No     Blood Transfusions No     Caffeine Concern No     Occupational Exposure No     Hobby Hazards No     Sleep Concern No     Stress Concern Yes     Weight Concern No     Special Diet No     Back Care No     Exercise Yes     Bike Helmet No     Seat Belt Yes     Self-Exams No     Parent/sibling w/ CABG, MI or angioplasty before 65F 55M? Not Asked   Social History Narrative     Not on file       Family History  Family History   Problem Relation Age of Onset     Diabetes Father      Arthritis Father      Heart Disease Father      Respiratory Father      Allergies Mother      Respiratory Mother      Asthma Brother      Allergies Brother      Diabetes Paternal Grandmother        ROS/MED HISTORY  The complete review of systems is negative other than noted in the HPI or here.    ENT/Pulmonary:     (+) tobacco use, COPD,     Neurologic:       Cardiovascular:     (+) -----Previous cardiac testing   Echo: Date: Results:    Stress Test: Date: Results:    ECG Reviewed: Date: 2013 Results:  SB  Cath: Date: Results:      METS/Exercise Tolerance: >4 METS    Hematologic: Comments: History BMT. Will require irradiated units    (+) history of blood transfusion, no previous transfusion reaction,     Musculoskeletal:   (+) arthritis,     GI/Hepatic:     (+) GERD, Symptomatic, hiatal hernia,     Renal/Genitourinary:  - neg Renal ROS     Endo:  - neg endo ROS     Psychiatric/Substance Use:  - neg psychiatric ROS     Infectious Disease:  - neg infectious disease ROS     Malignancy: Comment: chronic myelogenous leukemia with previous total body radiation s/p allo HCT 1/31/2006, and  DLI in 6/13/12, with remission achieved. He has chronic, stable GVHD.  (+) Malignancy, History of Lymphoma/Leukemia.Lymph CA  "Remission status post Radiation and Chemo.        Other:  - neg other ROS            Temp: 98.7  F (37.1  C) Temp src: Oral BP: 134/77 Pulse: 114   Resp: 16 SpO2: 97 %         198 lbs 0 oz  5' 8\"[PT REPORTED[   Body mass index is 30.11 kg/m .       Physical Exam  Constitutional: Awake, alert, cooperative, no apparent distress, and appears stated age.  Eyes: Pupils equal, round and reactive to light, extra ocular muscles intact, sclera clear, conjunctiva normal.  HENT: Normocephalic, oral pharynx with moist mucus membranes, dentures. No goiter appreciated. Raspy voice.  Respiratory: Clear to auscultation bilaterally, no crackles or wheezing. Mild coarseness in bases. No cough or obvious dyspnea.  Cardiovascular: Regular rate and rhythm, normal S1 and S2, and no murmur noted.  Carotids +2, no bruits. No edema. Palpable pulses to radial  DP and PT arteries.   GI: Normal bowel sounds, soft, non-distended, non-tender, no masses palpated, no hepatosplenomegaly.    Lymph/Hematologic: No cervical lymphadenopathy and no supraclavicular lymphadenopathy.  Genitourinary: Deferred.   Skin: Warm and dry.  No rashes at anticipated surgical site.   Musculoskeletal: Full ROM of neck. There is no redness, warmth, or swelling of the joints. Gross motor strength is normal.    Neurologic: Awake, alert, oriented to name, place and time. Cranial nerves II-XII are grossly intact. Gait is normal.   Neuropsychiatric: Calm, cooperative. Normal affect.     Labs: (personally reviewed)  Lab Results   Component Value Date    WBC 11.2 06/02/2021     Lab Results   Component Value Date    RBC 5.02 06/02/2021     Lab Results   Component Value Date    HGB 14.6 06/02/2021     Lab Results   Component Value Date    HCT 43.8 06/02/2021     Lab Results   Component Value Date    MCV 87 06/02/2021     Lab Results   Component Value Date    MCH 29.1 06/02/2021     Lab Results   Component Value Date    MCHC 33.3 06/02/2021     Lab Results   Component Value Date "    RDW 14.3 06/02/2021     Lab Results   Component Value Date     06/02/2021     Last Comprehensive Metabolic Panel:  Sodium   Date Value Ref Range Status   06/02/2021 134 133 - 144 mmol/L Final     Potassium   Date Value Ref Range Status   06/02/2021 4.0 3.4 - 5.3 mmol/L Final     Chloride   Date Value Ref Range Status   06/02/2021 99 94 - 109 mmol/L Final     Carbon Dioxide   Date Value Ref Range Status   06/02/2021 28 20 - 32 mmol/L Final     Anion Gap   Date Value Ref Range Status   06/02/2021 7 3 - 14 mmol/L Final     Glucose   Date Value Ref Range Status   06/02/2021 146 (H) 70 - 99 mg/dL Final     Urea Nitrogen   Date Value Ref Range Status   06/02/2021 15 7 - 30 mg/dL Final     Creatinine   Date Value Ref Range Status   06/02/2021 1.08 0.66 - 1.25 mg/dL Final     GFR Estimate   Date Value Ref Range Status   06/02/2021 85 >60 mL/min/[1.73_m2] Final     Comment:     Non  GFR Calc  Starting 12/18/2018, serum creatinine based estimated GFR (eGFR) will be   calculated using the Chronic Kidney Disease Epidemiology Collaboration   (CKD-EPI) equation.       Calcium   Date Value Ref Range Status   06/02/2021 9.8 8.5 - 10.1 mg/dL Final     EKG: Personally reviewed 6/2/21 Normal sinus rhythm    PFT's: 4/13/21  Most Recent [facility] Pulmonary Function Testing    FVC-Pred   Date Value Ref Range Status   04/13/2021 4.73 L      FVC-Pre   Date Value Ref Range Status   04/13/2021 4.61 L      FVC-%Pred-Pre   Date Value Ref Range Status   04/13/2021 97 %      FEV1-Pre   Date Value Ref Range Status   04/13/2021 2.94 L      FEV1-%Pred-Pre   Date Value Ref Range Status   04/13/2021 76 %      FEV1FVC-Pred   Date Value Ref Range Status   04/13/2021 81 %      FEV1FVC-Pre   Date Value Ref Range Status   04/13/2021 64 %      FEFMax-Pred   Date Value Ref Range Status   04/13/2021 9.51 L/sec      FEFMax-Pre   Date Value Ref Range Status   04/13/2021 7.73 L/sec      FEFMax-%Pred-Pre   Date Value Ref Range Status    04/13/2021 81 %      ExpTime-Pre   Date Value Ref Range Status   04/13/2021 8.32 sec      FIFMax-Pre   Date Value Ref Range Status   04/13/2021 6.45 L/sec      FEV1FEV6-Pred   Date Value Ref Range Status   04/13/2021 82 %      FEV1FEV6-Pre   Date Value Ref Range Status   04/13/2021 63 %        CT Chest 4/13/21                                                                    IMPRESSION:    1. Scattered areas of tree-in-bud and nodular groundglass opacities in  the bilateral lower lobes, concerning for atypical infection.  2. Borderline mediastinal lymphadenopathy, likely reactive.  3. Overall unchanged apical predominant emphysematous changes.    Imaging, PFTs and EKG reviewed by this provider      ASSESSMENT and PLAN  Caesar Richardson is a 40 year old male scheduled to undergo HERNIORRHAPHY, HIATAL, LAPAROSCOPIC, MAGNETIC SPHINCTER AUGMENTATION with Dr. Arnaldo Dominique on 6/4/21. He has the following specific operative considerations:   - RCRI : 0.9% risk of major adverse cardiac event.   - Anesthesia considerations:  Refer to PAC assessment in anesthesia records  - VTE risk: 0.5%  - LISA # of risks 1/8 = Low   - Risk of PONV score = 2.  If > 2, anti-emetic intervention recommended.    --Paraesophageal hernia with intractable reflux and significant symptoms. Above procedure now planned. Will take omeprazole on DOS.  --No history of problems with anesthesia.  --No cardiac history, symptoms or meds. Last EKG 2013 Sinus bradycardia. Update today: NSR. Able to walk some distance.   --Smoking history. Quit 11 weeks ago. COPD followed by Pulmonary. Being treated for fungal infection with voriconazole and will take on DOS. Will use Advair on DOS. Shortness of breath when climbing stairs. Lungs CTA.  --BMT history as above, in remission and with chronic GVHD. Patient denies significant symptoms or flare.   --Type and screen drawn today. Will require Irradiated units if needed due to BMT history.   --Rheumatoid arthritis  in knees and right ankle. He is s/p bilateral knee replacements. Will require careful positioning.     Arrival time, NPO, shower and medication instructions provided by nursing staff today.       Patient was discussed with Dr Zimmerman.    JASON Lobo CNS  Preoperative Assessment Center  [facility]  Phone: [phone]  Fax: [phone]

## 2021-06-02 NOTE — ANESTHESIA PREPROCEDURE EVALUATION
Anesthesia Pre-Procedure Evaluation    Patient: Caesar Richardson   MRN: [id number] : 1980        Preoperative Diagnosis: Esophagitis [K20.90]   Procedure : Procedure(s):  HERNIORRHAPHY, HIATAL, LAPAROSCOPIC, MAGNETIC SPHINCTER  AUGMENTATION     Past Medical History:   Diagnosis Date     Arthritis      BMT (bone marrow transplant)      CML (chronic myelocytic leukemia) (H)      COPD (chronic obstructive pulmonary disease) (H) 2013     GERD (gastroesophageal reflux disease)      Mcsam-trcrxd-ccrf disease (H) 2011     Hiatal hernia      Rheumatoid arthritis (H) 2011      Past Surgical History:   Procedure Laterality Date     ARTHROPLASTY KNEE UNICOMPARTMENT BILATERAL Bilateral 3/1/2018    Procedure: ARTHROPLASTY KNEE UNICOMPARTMENT BILATERAL;  Bilateral Knee Uni Compartmental Arthroplasty;  Surgeon: Pradeep Turner MD;  Location: [facility]     BMT CELL PRODUCT INFUSION       BRONCHOSCOPY (RIGID OR FLEXIBLE), DIAGNOSTIC N/A 2021    Procedure: BRONCHOSCOPY, WITH BRONCHOALVEOLAR LAVAGE;  Surgeon: Brenden Nick MD;  [facility]     ESOPHAGOSCOPY, GASTROSCOPY, DUODENOSCOPY (EGD), COMBINED N/A 2021    Procedure: ESOPHAGOGASTRODUODENOSCOPY, WITH BIOPSY;  Surgeon: Meño Murphy MD;  Location: [facility] OR     ESOPHAGOSCOPY, GASTROSCOPY, DUODENOSCOPY (EGD), COMBINED N/A 3/26/2021    Procedure: ESOPHAGOGASTRODUODENOSCOPY (EGD);  Surgeon: Jose Carlos Guzman MD;  Location: [facility] OR     LARYNGOSCOPY WITH BIOPSY(IES) N/A 2021    Procedure: LARYNGOSCOPY, WITH BIOPSY;  Surgeon: Santo Sigala MD;  Location: [facility]     ORTHOPEDIC SURGERY       TRANSPLANT      BMT      No Known Allergies   Social History     Tobacco Use     Smoking status: Former Smoker     Packs/day: 1.00     Years: 25.00     Pack years: 25.00     Types: Cigarettes     Quit date: 2019     Years since quittin.3     Smokeless tobacco: Never Used     Tobacco comment: down to few cigs  per week   Substance Use Topics     Alcohol use: No     Alcohol/week: 1.7 standard drinks      Wt Readings from Last 1 Encounters:   05/05/21 89.8 kg (197 lb 14.4 oz)        Anesthesia Evaluation   Pt has had prior anesthetic. Type: General.    No history of anesthetic complications       ROS/MED HX  ENT/Pulmonary: Comment: Quit smoking 11 weeks ago    (+) tobacco use, Past use, COPD,     Neurologic:  - neg neurologic ROS     Cardiovascular:     (+) -----Previous cardiac testing   Echo: Date: Results:    Stress Test: Date: Results:    ECG Reviewed: Date: 6/2/21 Results:  NSR  Cath: Date: Results:      METS/Exercise Tolerance: 4 - Raking leaves, gardening    Hematologic: Comments: History BMT. Will require irradiated units    (+) history of blood transfusion, no previous transfusion reaction,     Musculoskeletal: Comment: RA, s/p bilateral knee replacement  (+) arthritis,     GI/Hepatic:     (+) GERD, Symptomatic, hiatal hernia,     Renal/Genitourinary:  - neg Renal ROS     Endo:  - neg endo ROS     Psychiatric/Substance Use:  - neg psychiatric ROS     Infectious Disease:  - neg infectious disease ROS     Malignancy: Comment: chronic myelogenous leukemia with previous total body radiation s/p allo HCT 1/31/2006, and  DLI in 6/13/12, with remission achieved. He has chronic, stable GVHD.  (+) Malignancy, History of Lymphoma/Leukemia.Lymph CA Remission status post Radiation and Chemo.        Other:  - neg other ROS          Physical Exam    Airway        Mallampati: I   TM distance: > 3 FB   Neck ROM: full   Mouth opening: > 3 cm    Respiratory Devices and Support         Dental       (+) upper dentures and partials      Cardiovascular          Rhythm and rate: regular and normal     Pulmonary       Comment: Mild coarseness in bases.    breath sounds clear to auscultation           OUTSIDE LABS:  CBC:   Lab Results   Component Value Date    WBC 6.0 03/11/2020    WBC 6.3 09/19/2019    HGB 12.9 (L) 03/11/2020    HGB 13.2  (L) 09/19/2019    HCT 39.3 (L) 03/11/2020    HCT 39.4 (L) 09/19/2019     03/11/2020     09/19/2019     BMP:   Lab Results   Component Value Date     03/11/2020     09/19/2019    POTASSIUM 4.4 03/11/2020    POTASSIUM 4.3 09/19/2019    CHLORIDE 107 03/11/2020    CHLORIDE 113 (H) 09/19/2019    CO2 26 03/11/2020    CO2 22 09/19/2019    BUN 12 03/11/2020    BUN 20 09/19/2019    CR 1.06 03/11/2020    CR 1.10 09/19/2019     (H) 03/11/2020     (H) 09/19/2019     COAGS:   Lab Results   Component Value Date    PTT 25 03/07/2014    INR 0.93 03/04/2018    FIBR 354 06/28/2006     POC:   Lab Results   Component Value Date     (H) 03/01/2018     HEPATIC:   Lab Results   Component Value Date    ALBUMIN 3.5 03/11/2020    PROTTOTAL 6.5 (L) 03/11/2020    ALT 89 (H) 03/11/2020    AST 48 (H) 03/11/2020    ALKPHOS 125 03/11/2020    BILITOTAL 0.3 03/11/2020     OTHER:   Lab Results   Component Value Date    LACT 1.1 07/28/2012    LAKE 8.7 03/11/2020    PHOS 4.1 03/10/2014    MAG 2.0 03/12/2014    LIPASE 47 07/26/2013    AMYLASE 113 (H) 07/26/2013    TSH 1.77 10/25/2017    CRP 57.9 (H) 03/04/2018    SED 17 (H) 03/04/2018       Anesthesia Plan    ASA Status:  3   NPO Status:  NPO Appropriate    Anesthesia Type: General.     - Airway: ETT   Induction: RSI.   Maintenance: Balanced.   Techniques and Equipment:     - Lines/Monitors: 2nd IV     Consents    Anesthesia Plan(s) and associated risks, benefits, and realistic alternatives discussed. Questions answered and patient/representative(s) expressed understanding.     - Discussed with:  Patient      - Extended Intubation/Ventilatory Support Discussed: No.      - Patient is DNR/DNI Status: No    Use of blood products discussed: Yes.     - Discussed with: Patient.     - Consented: consented to blood products            Reason for refusal: other.     Postoperative Care       PONV prophylaxis: Ondansetron (or other 5HT-3), Dexamethasone or Solumedrol      Comments:              PAC Discussion and Assessment    ASA Classification: 3  Case is suitable for: [place]  Anesthetic techniques and relevant risks discussed: GA  Invasive monitoring and risk discussed: No    Possibility and Risk of blood transfusion discussed: Yes            PAC Resident/NP Anesthesia Assessment: Caesar Richardson is a 40 year old male scheduled to undergo HERNIORRHAPHY, HIATAL, LAPAROSCOPIC, MAGNETIC SPHINCTER AUGMENTATION with Dr. Arnaldo Dominique on 6/4/21. He has the following specific operative considerations:   - RCRI : 0.9% risk of major adverse cardiac event.   - VTE risk: 0.5%  - LISA # of risks 1/8 = Low   - Risk of PONV score = 2.  If > 2, anti-emetic intervention recommended.    --Paraesophageal hernia with intractable reflux and significant symptoms. Above procedure now planned. Will take omeprazole on DOS.  --No history of problems with anesthesia.  --No cardiac history, symptoms or meds. Last EKG 2013 Sinus bradycardia. Update today: NSR. Able to walk some distance.   --Smoking history. Quit 11 weeks ago. COPD followed by Pulmonary. Being treated for fungal infection with voriconazole and will take on DOS. Will use Advair on DOS. Shortness of breath when climbing stairs. Lungs CTA.  --BMT history as above, in remission and with chronic GVHD. Patient denies significant symptoms or flare.   --Type and screen drawn today. Will require Irradiated units if needed due to BMT history.   --Rheumatoid arthritis in knees and right ankle. He is s/p bilateral knee replacements. Will require careful positioning.         Patient was discussed with Dr Zimmerman.      Reviewed and Signed by PAC Mid-Level Provider/Resident  Mid-Level Provider/Resident: JASON Gilbert, CNS  Date: 6/2/21  Time: 9:55am                               JASON Lobo CNS

## 2021-06-03 LAB
ABO + RH BLD: NORMAL
ABO + RH BLD: NORMAL
BLD GP AB SCN SERPL QL: NORMAL
BLOOD BANK CMNT PATIENT-IMP: NORMAL
BLOOD BANK CMNT PATIENT-IMP: NORMAL
INTERPRETATION ECG - MUSE: NORMAL
SPECIMEN EXP DATE BLD: NORMAL

## 2021-06-04 ENCOUNTER — APPOINTMENT (OUTPATIENT)
Dept: GENERAL RADIOLOGY | Facility: CLINIC | Age: 41
End: 2021-06-04
Attending: STUDENT IN AN ORGANIZED HEALTH CARE EDUCATION/TRAINING PROGRAM
Payer: COMMERCIAL

## 2021-06-04 ENCOUNTER — ANESTHESIA (OUTPATIENT)
Dept: SURGERY | Facility: CLINIC | Age: 41
End: 2021-06-04
Payer: COMMERCIAL

## 2021-06-04 ENCOUNTER — HOSPITAL ENCOUNTER (OUTPATIENT)
Facility: CLINIC | Age: 41
Discharge: HOME | End: 2021-06-04
Attending: THORACIC SURGERY (CARDIOTHORACIC VASCULAR SURGERY) | Admitting: THORACIC SURGERY (CARDIOTHORACIC VASCULAR SURGERY)
Payer: COMMERCIAL

## 2021-06-04 VITALS
DIASTOLIC BLOOD PRESSURE: 93 MMHG | HEART RATE: 97 BPM | SYSTOLIC BLOOD PRESSURE: 134 MMHG | WEIGHT: 196.87 LBS | HEIGHT: 68 IN | TEMPERATURE: 98 F | BODY MASS INDEX: 29.84 KG/M2 | OXYGEN SATURATION: 97 % | RESPIRATION RATE: 18 BRPM

## 2021-06-04 DIAGNOSIS — K20.90 ESOPHAGITIS: ICD-10-CM

## 2021-06-04 LAB
APTT PPP: 20 SEC (ref 22–37)
FIBRINOGEN PPP-MCNC: 330 MG/DL (ref 200–420)
GLUCOSE BLDC GLUCOMTR-MCNC: 90 MG/DL (ref 70–99)
INR PPP: 1.01 (ref 0.86–1.14)

## 2021-06-04 PROCEDURE — 250N000011 HC RX IP 250 OP 636: Performed by: ANESTHESIOLOGY

## 2021-06-04 PROCEDURE — 250N000011 HC RX IP 250 OP 636: Performed by: THORACIC SURGERY (CARDIOTHORACIC VASCULAR SURGERY)

## 2021-06-04 PROCEDURE — 71045 X-RAY EXAM CHEST 1 VIEW: CPT | Mod: 26 | Performed by: RADIOLOGY

## 2021-06-04 PROCEDURE — 710N000009 HC RECOVERY PHASE 1, LEVEL 1, PER MIN: Performed by: THORACIC SURGERY (CARDIOTHORACIC VASCULAR SURGERY)

## 2021-06-04 PROCEDURE — L8699 PROSTHETIC IMPLANT NOS: HCPCS | Performed by: THORACIC SURGERY (CARDIOTHORACIC VASCULAR SURGERY)

## 2021-06-04 PROCEDURE — 360N000077 HC SURGERY LEVEL 4, PER MIN: Performed by: THORACIC SURGERY (CARDIOTHORACIC VASCULAR SURGERY)

## 2021-06-04 PROCEDURE — 250N000009 HC RX 250: Performed by: THORACIC SURGERY (CARDIOTHORACIC VASCULAR SURGERY)

## 2021-06-04 PROCEDURE — 85384 FIBRINOGEN ACTIVITY: CPT | Performed by: ANESTHESIOLOGY

## 2021-06-04 PROCEDURE — 258N000003 HC RX IP 258 OP 636: Performed by: NURSE ANESTHETIST, CERTIFIED REGISTERED

## 2021-06-04 PROCEDURE — 88302 TISSUE EXAM BY PATHOLOGIST: CPT | Mod: TC | Performed by: THORACIC SURGERY (CARDIOTHORACIC VASCULAR SURGERY)

## 2021-06-04 PROCEDURE — 85610 PROTHROMBIN TIME: CPT | Performed by: ANESTHESIOLOGY

## 2021-06-04 PROCEDURE — 250N000011 HC RX IP 250 OP 636: Performed by: NURSE ANESTHETIST, CERTIFIED REGISTERED

## 2021-06-04 PROCEDURE — 370N000017 HC ANESTHESIA TECHNICAL FEE, PER MIN: Performed by: THORACIC SURGERY (CARDIOTHORACIC VASCULAR SURGERY)

## 2021-06-04 PROCEDURE — 250N000009 HC RX 250: Performed by: NURSE ANESTHETIST, CERTIFIED REGISTERED

## 2021-06-04 PROCEDURE — 999N000141 HC STATISTIC PRE-PROCEDURE NURSING ASSESSMENT: Performed by: THORACIC SURGERY (CARDIOTHORACIC VASCULAR SURGERY)

## 2021-06-04 PROCEDURE — 85730 THROMBOPLASTIN TIME PARTIAL: CPT | Performed by: ANESTHESIOLOGY

## 2021-06-04 PROCEDURE — 250N000013 HC RX MED GY IP 250 OP 250 PS 637: Performed by: THORACIC SURGERY (CARDIOTHORACIC VASCULAR SURGERY)

## 2021-06-04 PROCEDURE — 250N000025 HC SEVOFLURANE, PER MIN: Performed by: THORACIC SURGERY (CARDIOTHORACIC VASCULAR SURGERY)

## 2021-06-04 PROCEDURE — 710N000012 HC RECOVERY PHASE 2, PER MINUTE: Performed by: THORACIC SURGERY (CARDIOTHORACIC VASCULAR SURGERY)

## 2021-06-04 PROCEDURE — 272N000001 HC OR GENERAL SUPPLY STERILE: Performed by: THORACIC SURGERY (CARDIOTHORACIC VASCULAR SURGERY)

## 2021-06-04 PROCEDURE — 999N000065 XR CHEST PORT 1 VIEW

## 2021-06-04 PROCEDURE — 250N000011 HC RX IP 250 OP 636

## 2021-06-04 PROCEDURE — 82962 GLUCOSE BLOOD TEST: CPT

## 2021-06-04 PROCEDURE — 88302 TISSUE EXAM BY PATHOLOGIST: CPT | Mod: 26 | Performed by: PATHOLOGY

## 2021-06-04 DEVICE — IMPLANTABLE DEVICE: Type: IMPLANTABLE DEVICE | Site: ABDOMEN | Status: FUNCTIONAL

## 2021-06-04 RX ORDER — MEPERIDINE HYDROCHLORIDE 25 MG/ML
12.5 INJECTION INTRAMUSCULAR; INTRAVENOUS; SUBCUTANEOUS
Status: DISCONTINUED | OUTPATIENT
Start: 2021-06-04 | End: 2021-06-05 | Stop reason: HOSPADM

## 2021-06-04 RX ORDER — ONDANSETRON 4 MG/1
4 TABLET, ORALLY DISINTEGRATING ORAL EVERY 30 MIN PRN
Status: DISCONTINUED | OUTPATIENT
Start: 2021-06-04 | End: 2021-06-05 | Stop reason: HOSPADM

## 2021-06-04 RX ORDER — LIDOCAINE 40 MG/G
CREAM TOPICAL
Status: DISCONTINUED | OUTPATIENT
Start: 2021-06-04 | End: 2021-06-04 | Stop reason: HOSPADM

## 2021-06-04 RX ORDER — SODIUM CHLORIDE, SODIUM LACTATE, POTASSIUM CHLORIDE, CALCIUM CHLORIDE 600; 310; 30; 20 MG/100ML; MG/100ML; MG/100ML; MG/100ML
INJECTION, SOLUTION INTRAVENOUS CONTINUOUS PRN
Status: DISCONTINUED | OUTPATIENT
Start: 2021-06-04 | End: 2021-06-04

## 2021-06-04 RX ORDER — DEXAMETHASONE SODIUM PHOSPHATE 4 MG/ML
INJECTION, SOLUTION INTRA-ARTICULAR; INTRALESIONAL; INTRAMUSCULAR; INTRAVENOUS; SOFT TISSUE PRN
Status: DISCONTINUED | OUTPATIENT
Start: 2021-06-04 | End: 2021-06-04

## 2021-06-04 RX ORDER — LIDOCAINE HYDROCHLORIDE 20 MG/ML
INJECTION, SOLUTION INFILTRATION; PERINEURAL PRN
Status: DISCONTINUED | OUTPATIENT
Start: 2021-06-04 | End: 2021-06-04

## 2021-06-04 RX ORDER — FENTANYL CITRATE 50 UG/ML
25-50 INJECTION, SOLUTION INTRAMUSCULAR; INTRAVENOUS
Status: DISCONTINUED | OUTPATIENT
Start: 2021-06-04 | End: 2021-06-04 | Stop reason: HOSPADM

## 2021-06-04 RX ORDER — LABETALOL HYDROCHLORIDE 5 MG/ML
INJECTION, SOLUTION INTRAVENOUS PRN
Status: DISCONTINUED | OUTPATIENT
Start: 2021-06-04 | End: 2021-06-04

## 2021-06-04 RX ORDER — HYDROMORPHONE HYDROCHLORIDE 1 MG/ML
.3-.5 INJECTION, SOLUTION INTRAMUSCULAR; INTRAVENOUS; SUBCUTANEOUS EVERY 10 MIN PRN
Status: DISCONTINUED | OUTPATIENT
Start: 2021-06-04 | End: 2021-06-05 | Stop reason: HOSPADM

## 2021-06-04 RX ORDER — NALOXONE HYDROCHLORIDE 0.4 MG/ML
0.4 INJECTION, SOLUTION INTRAMUSCULAR; INTRAVENOUS; SUBCUTANEOUS
Status: DISCONTINUED | OUTPATIENT
Start: 2021-06-04 | End: 2021-06-05 | Stop reason: HOSPADM

## 2021-06-04 RX ORDER — SODIUM CHLORIDE, SODIUM LACTATE, POTASSIUM CHLORIDE, CALCIUM CHLORIDE 600; 310; 30; 20 MG/100ML; MG/100ML; MG/100ML; MG/100ML
INJECTION, SOLUTION INTRAVENOUS CONTINUOUS
Status: DISCONTINUED | OUTPATIENT
Start: 2021-06-04 | End: 2021-06-05 | Stop reason: HOSPADM

## 2021-06-04 RX ORDER — ONDANSETRON 2 MG/ML
4 INJECTION INTRAMUSCULAR; INTRAVENOUS EVERY 30 MIN PRN
Status: DISCONTINUED | OUTPATIENT
Start: 2021-06-04 | End: 2021-06-05 | Stop reason: HOSPADM

## 2021-06-04 RX ORDER — ACETAMINOPHEN 325 MG/1
650 TABLET ORAL
Status: DISCONTINUED | OUTPATIENT
Start: 2021-06-04 | End: 2021-06-05 | Stop reason: HOSPADM

## 2021-06-04 RX ORDER — OXYCODONE HCL 5 MG/5 ML
5 SOLUTION, ORAL ORAL EVERY 6 HOURS PRN
Qty: 30 ML | Refills: 0 | Status: SHIPPED | OUTPATIENT
Start: 2021-06-04 | End: 2021-11-05

## 2021-06-04 RX ORDER — ACETAMINOPHEN 325 MG/1
975 TABLET ORAL ONCE
Status: COMPLETED | OUTPATIENT
Start: 2021-06-04 | End: 2021-06-04

## 2021-06-04 RX ORDER — NALOXONE HYDROCHLORIDE 0.4 MG/ML
0.2 INJECTION, SOLUTION INTRAMUSCULAR; INTRAVENOUS; SUBCUTANEOUS
Status: DISCONTINUED | OUTPATIENT
Start: 2021-06-04 | End: 2021-06-05 | Stop reason: HOSPADM

## 2021-06-04 RX ORDER — ONDANSETRON 2 MG/ML
INJECTION INTRAMUSCULAR; INTRAVENOUS PRN
Status: DISCONTINUED | OUTPATIENT
Start: 2021-06-04 | End: 2021-06-04

## 2021-06-04 RX ORDER — FENTANYL CITRATE 50 UG/ML
INJECTION, SOLUTION INTRAMUSCULAR; INTRAVENOUS PRN
Status: DISCONTINUED | OUTPATIENT
Start: 2021-06-04 | End: 2021-06-04

## 2021-06-04 RX ORDER — PROPOFOL 10 MG/ML
INJECTION, EMULSION INTRAVENOUS PRN
Status: DISCONTINUED | OUTPATIENT
Start: 2021-06-04 | End: 2021-06-04

## 2021-06-04 RX ORDER — OXYCODONE HYDROCHLORIDE 5 MG/1
5 TABLET ORAL
Status: DISCONTINUED | OUTPATIENT
Start: 2021-06-04 | End: 2021-06-05 | Stop reason: HOSPADM

## 2021-06-04 RX ORDER — SODIUM CHLORIDE, SODIUM LACTATE, POTASSIUM CHLORIDE, CALCIUM CHLORIDE 600; 310; 30; 20 MG/100ML; MG/100ML; MG/100ML; MG/100ML
INJECTION, SOLUTION INTRAVENOUS CONTINUOUS
Status: DISCONTINUED | OUTPATIENT
Start: 2021-06-04 | End: 2021-06-04 | Stop reason: HOSPADM

## 2021-06-04 RX ORDER — CEFAZOLIN SODIUM 2 G/100ML
2 INJECTION, SOLUTION INTRAVENOUS
Status: COMPLETED | OUTPATIENT
Start: 2021-06-04 | End: 2021-06-04

## 2021-06-04 RX ORDER — CEFAZOLIN SODIUM 2 G/100ML
2 INJECTION, SOLUTION INTRAVENOUS SEE ADMIN INSTRUCTIONS
Status: DISCONTINUED | OUTPATIENT
Start: 2021-06-04 | End: 2021-06-04 | Stop reason: HOSPADM

## 2021-06-04 RX ADMIN — SODIUM CHLORIDE, POTASSIUM CHLORIDE, SODIUM LACTATE AND CALCIUM CHLORIDE: 600; 310; 30; 20 INJECTION, SOLUTION INTRAVENOUS at 17:26

## 2021-06-04 RX ADMIN — ACETAMINOPHEN 975 MG: 325 TABLET, FILM COATED ORAL at 12:30

## 2021-06-04 RX ADMIN — HYDROMORPHONE HYDROCHLORIDE 0.5 MG: 1 INJECTION, SOLUTION INTRAMUSCULAR; INTRAVENOUS; SUBCUTANEOUS at 20:00

## 2021-06-04 RX ADMIN — ROCURONIUM BROMIDE 50 MG: 10 INJECTION INTRAVENOUS at 15:46

## 2021-06-04 RX ADMIN — SODIUM CHLORIDE, POTASSIUM CHLORIDE, SODIUM LACTATE AND CALCIUM CHLORIDE: 600; 310; 30; 20 INJECTION, SOLUTION INTRAVENOUS at 15:26

## 2021-06-04 RX ADMIN — DEXAMETHASONE SODIUM PHOSPHATE 4 MG: 4 INJECTION, SOLUTION INTRA-ARTICULAR; INTRALESIONAL; INTRAMUSCULAR; INTRAVENOUS; SOFT TISSUE at 15:42

## 2021-06-04 RX ADMIN — LIDOCAINE HYDROCHLORIDE 100 MG: 20 INJECTION, SOLUTION INFILTRATION; PERINEURAL at 15:30

## 2021-06-04 RX ADMIN — Medication 100 MG: at 15:30

## 2021-06-04 RX ADMIN — HYDROMORPHONE HYDROCHLORIDE 0.5 MG: 1 INJECTION, SOLUTION INTRAMUSCULAR; INTRAVENOUS; SUBCUTANEOUS at 18:10

## 2021-06-04 RX ADMIN — ROCURONIUM BROMIDE 10 MG: 10 INJECTION INTRAVENOUS at 18:24

## 2021-06-04 RX ADMIN — PROPOFOL 50 MG: 10 INJECTION, EMULSION INTRAVENOUS at 19:10

## 2021-06-04 RX ADMIN — HYDROMORPHONE HYDROCHLORIDE 0.5 MG: 1 INJECTION, SOLUTION INTRAMUSCULAR; INTRAVENOUS; SUBCUTANEOUS at 20:14

## 2021-06-04 RX ADMIN — FENTANYL CITRATE 50 MCG: 50 INJECTION, SOLUTION INTRAMUSCULAR; INTRAVENOUS at 16:09

## 2021-06-04 RX ADMIN — FENTANYL CITRATE 50 MCG: 50 INJECTION, SOLUTION INTRAMUSCULAR; INTRAVENOUS at 19:44

## 2021-06-04 RX ADMIN — ROCURONIUM BROMIDE 10 MG: 10 INJECTION INTRAVENOUS at 16:07

## 2021-06-04 RX ADMIN — FENTANYL CITRATE 50 MCG: 50 INJECTION, SOLUTION INTRAMUSCULAR; INTRAVENOUS at 19:49

## 2021-06-04 RX ADMIN — PROPOFOL 150 MG: 10 INJECTION, EMULSION INTRAVENOUS at 15:30

## 2021-06-04 RX ADMIN — ONDANSETRON 4 MG: 2 INJECTION INTRAMUSCULAR; INTRAVENOUS at 18:48

## 2021-06-04 RX ADMIN — ROCURONIUM BROMIDE 20 MG: 10 INJECTION INTRAVENOUS at 17:25

## 2021-06-04 RX ADMIN — HYDROMORPHONE HYDROCHLORIDE 0.5 MG: 1 INJECTION, SOLUTION INTRAMUSCULAR; INTRAVENOUS; SUBCUTANEOUS at 18:51

## 2021-06-04 RX ADMIN — MIDAZOLAM 2 MG: 1 INJECTION INTRAMUSCULAR; INTRAVENOUS at 15:23

## 2021-06-04 RX ADMIN — FENTANYL CITRATE 50 MCG: 50 INJECTION, SOLUTION INTRAMUSCULAR; INTRAVENOUS at 15:59

## 2021-06-04 RX ADMIN — LABETALOL HYDROCHLORIDE 2.5 MG: 5 INJECTION INTRAVENOUS at 19:02

## 2021-06-04 RX ADMIN — LABETALOL HYDROCHLORIDE 5 MG: 5 INJECTION INTRAVENOUS at 18:55

## 2021-06-04 RX ADMIN — FENTANYL CITRATE 150 MCG: 50 INJECTION, SOLUTION INTRAMUSCULAR; INTRAVENOUS at 15:30

## 2021-06-04 RX ADMIN — ROCURONIUM BROMIDE 20 MG: 10 INJECTION INTRAVENOUS at 16:55

## 2021-06-04 RX ADMIN — ROCURONIUM BROMIDE 10 MG: 10 INJECTION INTRAVENOUS at 17:53

## 2021-06-04 RX ADMIN — CEFAZOLIN 2 G: 10 INJECTION, POWDER, FOR SOLUTION INTRAVENOUS at 15:48

## 2021-06-04 ASSESSMENT — MIFFLIN-ST. JEOR: SCORE: 1777.5

## 2021-06-04 NOTE — BRIEF OP NOTE
[facility]    Brief Operative Note    Pre-operative diagnosis: Esophagitis [K20.90]  Post-operative diagnosis GERD    Procedure: Procedure(s):  HERNIORRHAPHY, HIATAL, LAPAROSCOPIC, MAGNETIC SPHINCTER  AUGMENTATION  Surgeon: Surgeon(s) and Role:     * Yumi Chin MD - Primary     * Dave Spencer MD - Resident - Assisting      * Jimi Tyler MD (Cardiothoracic Surgery Fellow)- Assisting   Anesthesia: General   Estimated blood loss: * No values recorded between 6/4/2021  3:58 PM and 6/4/2021  6:55 PM *  Drains: none  Specimens:   ID Type Source Tests Collected by Time Destination   A : hernia sac  Tissue Hernia Sac SURGICAL PATHOLOGY EXAM Yumi Chin MD 6/4/2021  5:16 PM      Findings:   None.  Complications: None.  Implants:   Implant Name Type Inv. Item Serial No.  Lot No. LRB No. Used Action   IMP REFLUX MANAGEMENT SYSTEM LINX 16 BEAD SIZE [id number] - [id number] Prosthesis IMP REFLUX MANAGEMENT SYSTEM LINX 16 BEAD SIZE [id number]  [facility] [id number] N/A 1 Implanted       Plan:   CXR in PACU, resume regular diet, continue prior to admission medications, activity as tolerated, use liquid oxycodone for pain control after discharge, ok to discharge tonight.    Dave Spencer MD  Surgery Resident PGY3

## 2021-06-04 NOTE — ANESTHESIA PROCEDURE NOTES
Airway       Patient location during procedure: OR  Staff -        Other Anesthesia Staff: Natalya Vega RN       Performed By: SRNA  Consent for Airway        Urgency: elective  Indications and Patient Condition       Indications for airway management: mandeep-procedural       Induction type:RSI       Mask difficulty assessment: 0 - not attempted    Final Airway Details       Final airway type: endotracheal airway       Successful airway: ETT - single  Endotracheal Airway Details        ETT size (mm): 8.0       Successful intubation technique: direct laryngoscopy       DL Blade Type: Mata 2       Grade View of Cords: 1       Adjucts: stylet       Position: Right       Measured from: lips       Secured at (cm): 23       Bite block used: None    Post intubation assessment        Placement verified by: capnometry, equal breath sounds and chest rise        Number of attempts at approach: 1       Number of other approaches attempted: 0       Secured with: pink tape       Ease of procedure: easy       Dentition: Intact and Unchanged    Medication(s) Administered   Medication Administration Time: 6/4/2021 3:32 PM

## 2021-06-05 ENCOUNTER — TELEPHONE (OUTPATIENT)
Dept: ONCOLOGY | Facility: CLINIC | Age: 41
End: 2021-06-05

## 2021-06-05 ENCOUNTER — TELEPHONE (OUTPATIENT)
Dept: TRANSPLANT | Facility: CLINIC | Age: 41
End: 2021-06-05

## 2021-06-05 NOTE — TELEPHONE ENCOUNTER
"Patient called 9:11am with request to schedule an appointment with Dr. Leon. He had surgery yesterday and in excrutiating pain. I encouraged patient to present to the emergency room if pain could not be managed at home. He stated \"I only trust Dr. Leon and will not see anyone, but him.\"     Informed patient that I would request an appointment with Dr. Leon for next week, but if pain worsens he should reconsider and present to the ED for evaluation.       Rafal Patel MD  HOT Fellow   "

## 2021-06-05 NOTE — PROVIDER NOTIFICATION
Pt very restless in PACU, pulling at lines, rubbing face, and not following directions. Surgery team made aware pt may need to stay the night. Dr. Javier at bedside to assess pt. 100 fentanyl and 1mg dilaudid given without improvement.

## 2021-06-05 NOTE — PROGRESS NOTES
"06/04/2021  Surgery Cross Cover Note    Patient reportedly restless and disoriented in PACU after dilaudid. Now 90 min after his last dose of dilaudid he is calm, alert, oriented, and feels like his normal self. Reports he has a known side effect of hallucination with dilaudid; this has now been added to his allergy list in his chart.    /80   Pulse 106   Temp 98  F (36.7  C) (Oral)   Resp 12   Ht 1.727 m (5' 8\")   Wt 89.3 kg (196 lb 13.9 oz)   SpO2 96%   BMI 29.93 kg/m      NAD, sitting comfortably in bed  A&O x3  Nonlabored breathing on room air  Noncyanotic  Abdomen soft, appropriately tender, nondistended  Laparoscopy incisions c/d/i    CXR in pacu without pneumothorax    39 yo male s/p laparoscopic hiatal hernia repair with LINX magnetic sphincter augmentation. Hallucination and restlessness secondary to dilaudid now resolved.    Patient appropriate for discharge home, which he is in agreement with.  Discharge orders placed.  Please call with questions.    Kareem Deras MD  Surgery resident  [phone]  "

## 2021-06-05 NOTE — PROGRESS NOTES
Pt said he has bad hallucinations with dilaudid and has communicated this with the anesthesiologist and team taking care of him today. Allergy added to chart.

## 2021-06-05 NOTE — PROVIDER NOTIFICATION
Surgery resident at bedside to assess pt, pt ok to go home now pt is more awake and able to communicate.

## 2021-06-05 NOTE — DISCHARGE INSTRUCTIONS
[facility]  Same-Day Surgery   Adult Discharge Orders & Instructions     For 24 hours after surgery    1. Get plenty of rest.  A responsible adult must stay with you for at least 24 hours after you leave the hospital.   2. Do not drive or use heavy equipment.  If you have weakness or tingling, don't drive or use heavy equipment until this feeling goes away.  3. Do not drink alcohol.  4. Avoid strenuous or risky activities.  Ask for help when climbing stairs.   5. You may feel lightheaded.  IF so, sit for a few minutes before standing.  Have someone help you get up.   6. If you have nausea (feel sick to your stomach): Drink only clear liquids such as apple juice, ginger ale, broth or 7-Up.  Rest may also help.  Be sure to drink enough fluids.  Move to a regular diet as you feel able.  7. You may have a slight fever. Call the doctor if your fever is over 100 F (37.7 C) (taken under the tongue) or lasts longer than 24 hours.  8. You may have a dry mouth, a sore throat, muscle aches or trouble sleeping.  These should go away after 24 hours.  9. Do not make important or legal decisions.   Call your doctor for any of the followin.  Signs of infection (fever, growing tenderness at the surgery site, a large amount of drainage or bleeding, severe pain, foul-smelling drainage, redness, swelling).    2. It has been over 8 to 10 hours since surgery and you are still not able to urinate (pass water).    3.  Headache for over 24 hours.    To contact a doctor, call Dr. Arnaldo Dominique at [phone]  or:        [phone] and ask for the resident on call for thoracic surgery (answered 24 hours a day)      Emergency Department:    [facility]: [phone]       (TTY for hearing impaired: [phone])    [facility]: [phone]       (TTY for hearing impaired: [phone])

## 2021-06-07 NOTE — OP NOTE
Procedure Date: 06/04/2021    PREOPERATIVE DIAGNOSIS:  Paraesophageal hernia and severe gastroesophageal reflux disease.    POSTOPERATIVE DIAGNOSIS:  Paraesophageal hernia and severe gastroesophageal reflux disease.    PROCEDURE PERFORMED:  Laparoscopic paraesophageal hernia repair with magnetic sphincter augmentation.    ANESTHESIA:  General endotracheal anesthesia.    SURGEON:  Yumi Dominique MD    ASSISTANTS:  Jimi Tyler MD (Cardiothoracic Surgery Fellow); Dave Spencer MD (General Surgery Resident)    COMPLICATIONS:  None.    ESTIMATED BLOOD LOSS:  Minimal.    IMPLANTS:  Magnetic sphincter augmentation was performed using the LINX device, 16 mm.    OPERATIVE FINDINGS:  The patient had a moderate-sized hiatal hernia.  The dissection was taken up to the level of the inferior pulmonary ligament.  He had extensive chronic inflammatory changes in the posterior mediastinum surrounding the esophagus, which made the procedure extremely complex.  It was a very challenging dissection; however, we had excellent intra-abdominal esophageal length.  We then measured the esophageal width with the LINX device and it accommodated a 16 mm device without problems.  We identified the posterior vagus nerve as well as the anterior nerve and preserved those throughout the case.    DESCRIPTION OF PROCEDURE IN DETAIL:  The patient was taken to the operating room and laid supine.  General anesthesia was induced.  The abdomen was prepped with ChloraPrep and draped in normal sterile fashion.  A formal timeout was carried out confirming the name of the patient and correct procedure.  He had SCDs in place and functioning prior to induction of anesthesia.    After the timeout was complete, we introduced the abdominal ports.  We used 12 mm ports on either side of the midline 2 fingerbreadths above the umbilicus and 5 mm ports in the upper quadrants of the abdomen.  We placed an Quin liver retractor.  The patient was  positioned in steep Trendelenburg.    We started insufflation maintained to 12 mmHg.  We entered the abdomen with an open Сергей technique.  Initial inspection revealed that the left lateral lobe of the liver was adhered to the omentum.  These adhesions were taken down.  Once all the adhesions were taken down in the abdomen, we repositioned the Quin liver retractor.  We inspected the patient's lyudmila.  He had a significant amount of periesophageal fat at the level of the phrenoesophageal membrane.  We opened this up and started dissection on the right lyudmila of the diaphragm.  We created a window around the esophagus and placed a Penrose drain for retraction.  We then continued the dissection up into the mediastinum surrounding the esophagus at this level.  The patient had chronic inflammatory changes and it was a very challenging dissection.  We performed an endoscopy, which confirmed absence of esophageal injury.  Once we mobilized the esophagus was completely up to the level of the inferior pulmonary veins, we then repeated endoscopy to check esophageal length , which was excellent.    We then excised the hiatal hernia sac and we sent it to pathology.  We clearly identified the posterior vagus and created a window between the vagus and the esophagus.  Next, we proceeded to repair the lyudmila of the diaphragm.  We used 4 interrupted 2-0 silk suture with a 4-0 pledget.  Once the lyudmila was repaired, we then brought the length measuring device, which was brought through the right upper quadrant port.  It appeared to accommodate a 16 mm LINX device, which was brought into the field and wrapped around the esophagus between this window between the esophagus and the posterior vagus and the LINX device was engaged and locked and the sutures were cut.  A picture was taken for documentation purposes.  Hemostasis was confirmed.  The ports were removed under direct vision.      Incisions were irrigated and closed with absorbable  suture.  The patient tolerated the procedure well.  All instrument and sponge counts were correct at the end of the case.  The patient was extubated without any complications and transferred to PACU.    Yumi Dominique MD        D: 2021   T: 2021   MT: JESUS    Name:     GRACIE CORONEL  MRN:      [id number]        Account:        [id number]   :      1980           Procedure Date: 2021     Document: [id number]

## 2021-06-09 LAB — COPATH REPORT: NORMAL

## 2021-06-14 ENCOUNTER — TELEPHONE (OUTPATIENT)
Dept: PULMONOLOGY | Facility: CLINIC | Age: 41
End: 2021-06-14

## 2021-06-14 NOTE — TELEPHONE ENCOUNTER
Faxed 4/10/21 CT report to [facility] for comparison. Called [facility] at [phone] for CT images. Per Bree, unable to push to [facility]. They will put images on disk and mail to clinic. Verified address and mail code with [facility] imaging department. Spoke with patient to inform of situation and that we will call him to re-schedule after images are received and Dr Nick reviews them. Pt understood. Stated he has upcoming appts at [facility] on 7/9 if that is an option for coordination of scheduling. Will be in touch with patient accordingly.

## 2021-06-18 DIAGNOSIS — K20.80 ESOPHAGITIS, LOS ANGELES GRADE C: ICD-10-CM

## 2021-06-18 LAB
MYCOBACTERIUM SPEC CULT: NORMAL
SPECIMEN SOURCE: NORMAL
SPECIMEN SOURCE: NORMAL

## 2021-06-21 RX ORDER — OMEPRAZOLE 40 MG/1
40 CAPSULE, DELAYED RELEASE ORAL
Qty: 180 CAPSULE | Refills: 3 | Status: SHIPPED | OUTPATIENT
Start: 2021-06-21

## 2021-06-21 NOTE — TELEPHONE ENCOUNTER
4/28/2021  [facility] [place]   Sascha Gunter MD  Gastroenterology     omeprazole (PRILOSEC) 40 MG DR capsule    Continue omeprazole 40 mg po BID until otherwise directed

## 2021-07-06 DIAGNOSIS — Z13.1 SCREENING FOR DIABETES MELLITUS: ICD-10-CM

## 2021-07-06 DIAGNOSIS — Z94.81 STATUS POST BONE MARROW TRANSPLANT (H): Primary | ICD-10-CM

## 2021-07-06 DIAGNOSIS — Z13.220 LIPID SCREENING: ICD-10-CM

## 2021-07-07 ENCOUNTER — VIRTUAL VISIT (OUTPATIENT)
Dept: SURGERY | Facility: CLINIC | Age: 41
End: 2021-07-07
Attending: CLINICAL NURSE SPECIALIST
Payer: MEDICARE

## 2021-07-07 DIAGNOSIS — K21.01 GASTROESOPHAGEAL REFLUX DISEASE WITH ESOPHAGITIS AND HEMORRHAGE: Primary | ICD-10-CM

## 2021-07-07 PROCEDURE — 999N000109 HC STATISTIC OP CR VISIT

## 2021-07-07 PROCEDURE — 99024 POSTOP FOLLOW-UP VISIT: CPT | Mod: 95 | Performed by: CLINICAL NURSE SPECIALIST

## 2021-07-07 NOTE — PROGRESS NOTES
"LOU is a 40 year old who is being evaluated via a billable telephone visit.      What phone number would you like to be contacted at? [phone]    How would you like to obtain your AVS? [employer]    Vitals - Patient Reported  Weight (Patient Reported): 88.5 kg (195 lb)  Height (Patient Reported): 172.7 cm (5' 7.99\")  BMI (Based on Pt Reported Ht/Wt): 29.66  Pain Score: No Pain (0)    Maurilio Dhillon [facility]     THORACIC SURGERY FOLLOW UP VISIT    I spoke by telephone to . Caesar Richardson in follow-up today. The clinical summary follows:     PREOPERATIVE DIAGNOSIS:    Paraesophageal hernia and severe gastroesophageal reflux disease.        PROCEDURE PERFORMED:   6/4/21   Laparoscopic paraesophageal hernia repair with magnetic sphincter augmentation.None    INTERVAL STUDIES  None    SUBJECTIVE  I am doing OK, just need to remember to eat in small bites or food sticks.   I know it is my fault.   I have no pain and my weight is steady.    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    IMPRESSION GERD with hiatal hernia    LOU is a 40 year old recovering well after a LINX procedure.   He is still taking omeprazole but \"just one a day instead of 2\".   He is sleeping well, has normal bowel function and a good appetite.   He denies pain, fever, shortness of breath.  His incisions are healing well with no erythema or drainage.   He has not had any imaging.  He asked about \"some type of scan\" to make sure he hasn't damaged anything when he eats to fast/large bites and regurgitates.       PLAN  I spent 30 min on the date of the encounter in chart review, patient visit, review of tests, documentation and/or discussion with other providers about the issues documented above. I reviewed the plan as follows:  Return or call PRN  If eating continues to be problematic at times, call us and we can arrange an esophagram xray when you return to the [facility] in November, or " sooner.  All questions were answered and the patient and present family were in agreement with the plan.  I appreciate the opportunity to participate in the care of your patient and will keep you updated.  Sincerely,  JASON Villalpando. CNS

## 2021-07-07 NOTE — LETTER
"    7/7/2021         RE: Caesar Richardson  [address] [facility] [address]        Dear Colleague,    Thank you for referring your patient, Caesar Richardson, to the [facility]. Please see a copy of my visit note below.    LOU is a 40 year old who is being evaluated via a billable telephone visit.      What phone number would you like to be contacted at? [phone]    How would you like to obtain your AVS? MyChart    Vitals - Patient Reported  Weight (Patient Reported): 88.5 kg (195 lb)  Height (Patient Reported): 172.7 cm (5' 7.99\")  BMI (Based on Pt Reported Ht/Wt): 29.66  Pain Score: No Pain (0)    Maurilio Dhillon LPN     THORACIC SURGERY FOLLOW UP VISIT    I spoke by telephone to Mr. Caesar Richardson in follow-up today. The clinical summary follows:     PREOPERATIVE DIAGNOSIS:    Paraesophageal hernia and severe gastroesophageal reflux disease.        PROCEDURE PERFORMED:   6/4/21   Laparoscopic paraesophageal hernia repair with magnetic sphincter augmentation.None    INTERVAL STUDIES  None    SUBJECTIVE  I am doing OK, just need to remember to eat in small bites or food sticks.   I know it is my fault.   I have no pain and my weight is steady.    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    IMPRESSION GERD with hiatal hernia    LOU is a 40 year old recovering well after a LINX procedure.   He is still taking omeprazole but \"just one a day instead of 2\".   He is sleeping well, has normal bowel function and a good appetite.   He denies pain, fever, shortness of breath.  His incisions are healing well with no erythema or drainage.   He has not had any imaging.  He asked about \"some type of scan\" to make sure he hasn't damaged anything when he eats to fast/large bites and regurgitates.       PLAN  I spent 30 min on the date of the encounter in chart review, patient visit, review of tests, documentation and/or discussion with " other providers about the issues documented above. I reviewed the plan as follows:  Return or call PRN  If eating continues to be problematic at times, call us and we can arrange an esophagram xray when you return to the [facility] in November, or sooner.  All questions were answered and the patient and present family were in agreement with the plan.  I appreciate the opportunity to participate in the care of your patient and will keep you updated.  Sincerely,  JASON Villalpando. CNS          Again, thank you for allowing me to participate in the care of your patient.        Sincerely,        JASON Ramos CNS

## 2021-07-09 ENCOUNTER — ANCILLARY PROCEDURE (OUTPATIENT)
Dept: ULTRASOUND IMAGING | Facility: CLINIC | Age: 41
End: 2021-07-09
Attending: INTERNAL MEDICINE
Payer: COMMERCIAL

## 2021-07-09 ENCOUNTER — APPOINTMENT (OUTPATIENT)
Dept: LAB | Facility: CLINIC | Age: 41
End: 2021-07-09
Attending: INTERNAL MEDICINE
Payer: COMMERCIAL

## 2021-07-09 ENCOUNTER — ONCOLOGY VISIT (OUTPATIENT)
Dept: TRANSPLANT | Facility: CLINIC | Age: 41
End: 2021-07-09
Attending: INTERNAL MEDICINE
Payer: COMMERCIAL

## 2021-07-09 ENCOUNTER — TELEPHONE (OUTPATIENT)
Dept: PULMONOLOGY | Facility: CLINIC | Age: 41
End: 2021-07-09

## 2021-07-09 VITALS
OXYGEN SATURATION: 97 % | HEART RATE: 96 BPM | RESPIRATION RATE: 16 BRPM | WEIGHT: 194.3 LBS | BODY MASS INDEX: 29.45 KG/M2 | DIASTOLIC BLOOD PRESSURE: 74 MMHG | HEIGHT: 68 IN | SYSTOLIC BLOOD PRESSURE: 111 MMHG | TEMPERATURE: 98.8 F

## 2021-07-09 DIAGNOSIS — Z94.81 STATUS POST BONE MARROW TRANSPLANT (H): ICD-10-CM

## 2021-07-09 DIAGNOSIS — Z13.220 LIPID SCREENING: ICD-10-CM

## 2021-07-09 DIAGNOSIS — B49 FUNGAL INFECTION OF LUNG: Primary | ICD-10-CM

## 2021-07-09 LAB
ALBUMIN SERPL-MCNC: 3.7 G/DL (ref 3.4–5)
ALBUMIN UR-MCNC: NEGATIVE MG/DL
ALP SERPL-CCNC: 179 U/L (ref 40–150)
ALT SERPL W P-5'-P-CCNC: 43 U/L (ref 0–70)
ANION GAP SERPL CALCULATED.3IONS-SCNC: 7 MMOL/L (ref 3–14)
APPEARANCE UR: CLEAR
AST SERPL W P-5'-P-CCNC: 25 U/L (ref 0–45)
BASOPHILS # BLD AUTO: 0.1 10E9/L (ref 0–0.2)
BASOPHILS NFR BLD AUTO: 0.7 %
BILIRUB SERPL-MCNC: 0.6 MG/DL (ref 0.2–1.3)
BILIRUB UR QL STRIP: NEGATIVE
BUN SERPL-MCNC: 13 MG/DL (ref 7–30)
CALCIUM SERPL-MCNC: 9.1 MG/DL (ref 8.5–10.1)
CHLORIDE SERPL-SCNC: 106 MMOL/L (ref 94–109)
CHOLEST SERPL-MCNC: 178 MG/DL
CO2 SERPL-SCNC: 23 MMOL/L (ref 20–32)
COLOR UR AUTO: YELLOW
CREAT SERPL-MCNC: 1.07 MG/DL (ref 0.66–1.25)
DIFFERENTIAL METHOD BLD: ABNORMAL
EOSINOPHIL # BLD AUTO: 0.5 10E9/L (ref 0–0.7)
EOSINOPHIL NFR BLD AUTO: 4.6 %
ERYTHROCYTE [DISTWIDTH] IN BLOOD BY AUTOMATED COUNT: 13.5 % (ref 10–15)
GFR SERPL CREATININE-BSD FRML MDRD: 86 ML/MIN/{1.73_M2}
GLUCOSE SERPL-MCNC: 75 MG/DL (ref 70–99)
GLUCOSE UR STRIP-MCNC: NEGATIVE MG/DL
HCT VFR BLD AUTO: 40 % (ref 40–53)
HDLC SERPL-MCNC: 42 MG/DL
HGB BLD-MCNC: 13 G/DL (ref 13.3–17.7)
HGB UR QL STRIP: NEGATIVE
IMM GRANULOCYTES # BLD: 0 10E9/L (ref 0–0.4)
IMM GRANULOCYTES NFR BLD: 0.4 %
KETONES UR STRIP-MCNC: NEGATIVE MG/DL
LDLC SERPL CALC-MCNC: 112 MG/DL
LEUKOCYTE ESTERASE UR QL STRIP: NEGATIVE
LYMPHOCYTES # BLD AUTO: 3 10E9/L (ref 0.8–5.3)
LYMPHOCYTES NFR BLD AUTO: 30.1 %
MCH RBC QN AUTO: 28.1 PG (ref 26.5–33)
MCHC RBC AUTO-ENTMCNC: 32.5 G/DL (ref 31.5–36.5)
MCV RBC AUTO: 87 FL (ref 78–100)
MONOCYTES # BLD AUTO: 0.9 10E9/L (ref 0–1.3)
MONOCYTES NFR BLD AUTO: 8.9 %
NEUTROPHILS # BLD AUTO: 5.4 10E9/L (ref 1.6–8.3)
NEUTROPHILS NFR BLD AUTO: 55.3 %
NITRATE UR QL: NEGATIVE
NONHDLC SERPL-MCNC: 136 MG/DL
NRBC # BLD AUTO: 0 10*3/UL
NRBC BLD AUTO-RTO: 0 /100
PH UR STRIP: 6 PH (ref 5–7)
PLATELET # BLD AUTO: 323 10E9/L (ref 150–450)
POTASSIUM SERPL-SCNC: 4.2 MMOL/L (ref 3.4–5.3)
PROT SERPL-MCNC: 7.9 G/DL (ref 6.8–8.8)
RBC # BLD AUTO: 4.62 10E12/L (ref 4.4–5.9)
SODIUM SERPL-SCNC: 136 MMOL/L (ref 133–144)
SOURCE: NORMAL
SP GR UR STRIP: 1.01 (ref 1–1.03)
TRIGL SERPL-MCNC: 119 MG/DL
UROBILINOGEN UR STRIP-MCNC: 0 MG/DL (ref 0–2)
WBC # BLD AUTO: 9.8 10E9/L (ref 4–11)

## 2021-07-09 PROCEDURE — 80061 LIPID PANEL: CPT | Performed by: INTERNAL MEDICINE

## 2021-07-09 PROCEDURE — G0463 HOSPITAL OUTPT CLINIC VISIT: HCPCS

## 2021-07-09 PROCEDURE — 36415 COLL VENOUS BLD VENIPUNCTURE: CPT

## 2021-07-09 PROCEDURE — 76770 US EXAM ABDO BACK WALL COMP: CPT | Performed by: RADIOLOGY

## 2021-07-09 PROCEDURE — 99214 OFFICE O/P EST MOD 30 MIN: CPT | Mod: 25 | Performed by: INTERNAL MEDICINE

## 2021-07-09 PROCEDURE — 81003 URINALYSIS AUTO W/O SCOPE: CPT | Performed by: INTERNAL MEDICINE

## 2021-07-09 PROCEDURE — 85025 COMPLETE CBC W/AUTO DIFF WBC: CPT | Performed by: INTERNAL MEDICINE

## 2021-07-09 PROCEDURE — 84478 ASSAY OF TRIGLYCERIDES: CPT | Performed by: INTERNAL MEDICINE

## 2021-07-09 PROCEDURE — 80053 COMPREHEN METABOLIC PANEL: CPT | Performed by: INTERNAL MEDICINE

## 2021-07-09 ASSESSMENT — PAIN SCALES - GENERAL: PAINLEVEL: NO PAIN (0)

## 2021-07-09 ASSESSMENT — MIFFLIN-ST. JEOR: SCORE: 1765.71

## 2021-07-09 NOTE — CONFIDENTIAL NOTE
Dr Nick reviewed the CT. The changes from 4-21 have resolved and patient can stop the anti-fungal agent he is taking. He needs to have a repeat chest CT in 2 months; He can do this locally and Dr Nick can review after it is pushed over.  Patient given this message. He had stopped anti fungal on June 4 th. Will fax repeat chest ct order to local hospital. He confirmed understanding.

## 2021-07-09 NOTE — NURSING NOTE
"Oncology Rooming Note    July 9, 2021 10:50 AM   Caesar Richardson is a 40 year old male who presents for:    Chief Complaint   Patient presents with     Blood Draw     Labs drawn via  by RN. VS taken.     Oncology Clinic Visit     chronic myelocytic leukemia     Initial Vitals: /74   Pulse 96   Temp 98.8  F (37.1  C) (Oral)   Resp 16   Ht 1.727 m (5' 7.99\")   Wt 88.1 kg (194 lb 4.8 oz)   SpO2 97%   BMI 29.55 kg/m   Estimated body mass index is 29.55 kg/m  as calculated from the following:    Height as of this encounter: 1.727 m (5' 7.99\").    Weight as of this encounter: 88.1 kg (194 lb 4.8 oz). Body surface area is 2.06 meters squared.  No Pain (0) Comment: Data Unavailable   No LMP for male patient.  Allergies reviewed: Yes  Medications reviewed: Yes    Medications: Medication refills not needed today.  Pharmacy name entered into [employer]:    [facility]  [facility], [place], [place] - [address]    Clinical concerns: no new concerns        Quinton Rahman MA            "

## 2021-07-09 NOTE — PROGRESS NOTES
CLINIC PROGRESS NOTE    I saw and examined Mr. 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  The patient returns today for a regular followup after his recent surgery for severe gastroesophageal reflux.    The patient reports that he is finally doing better in the last week.  The symptoms that he developed after the surgery have now resolved, and he is much more functional.  He feels that he is finally back to where he should be.  He is eating better and does not have heartburn or chest pain.  His mood is excellent, and he continues to be active driving in [place].  He continues to smoke.  He does not abuse alcohol or drugs.  He is somewhat overweight and knows that he has high glucose in the blood, but has not established primary care in [place].  He continues to have occasional bronchitis, but that is not symptomatic today.      The remaining complete review of systems is otherwise negative.    PHYSICAL EXAMINATION:  The patient is alert, oriented and in no acute distress.  He is not pale or jaundiced, and he does not have any palpable adenopathy.  His buccal mucosa shows plates, but no mucosal damage or lichenoid changes.  There is no drainage from the nasopharynx.  His vital signs today show blood pressure of 111/74, temperature of 98.8, heart rate of 90, respirations of 16 and oxygen saturation of 97% on room air.  His weight today is 88 kg with a BMI of 29.5.  His lungs have some rare wheezing in the bases, but otherwise no crackles or rhonchi.  His heart is regular with no gallop, rub or murmur.  His abdomen is moderately obese, soft and nontender.  There is no organomegaly or masses.  He has the scars from the recent laparoscopic surgery.  Extremities are warm and well perfused with no edema.  The patient has no rashes or fibrotic changes consistent with GVHD.    LABORATORY:  The patient's blood work today shows a total white cell count of 9.8, hemoglobin of 13, platelets of  323,000 and neutrophils of 5.4.  His chemistry panel shows a normal liver function and electrolytes with a mildly elevated alk phos of 179.  His total cholesterol was 178, triglycerides were 119, and HDL cholesterol was 42.  His glucose today was 75.    Mr. 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  He is doing better recovering from a recent surgery.  The notable problem that requires medical attention at this time is the slight drop in his hemoglobin that could be a combination of the hematuria that was detected on a recent UA and the recent surgery as well.  In the face of that, we are going to repeat a UA, and we will order a KUB.  The patient has a long history of smoking and is at risk for bladder cancer.  If persistent hematuria with or without ultrasound changes, we would recommend that he see a urologist for further evaluation.    The patient is also overweight and has had intermittent sugar in the urine.  While his fasting glucose today is normal, I strongly recommend that he establish with a primary care physician in [place] to manage all these metabolic issues, which include being overweight, monitoring his lipids and monitoring his glucose and managing it.  I stressed to him that he is only 40 years old, and he needs to do preventative care now rather than waiting until he is in his 50s-60s.    Mr. Richardson, as usual, was very cheerful and agreeable to the plan.  We will see him back in November or sooner if clinically necessary.    Sebas Leon MD on 7/9/2021 at 4:09 PM        30  minutes spent on the date of the encounter doing chart review, history and exam, documentation and further activities per the note

## 2021-07-09 NOTE — LETTER
7/9/2021         RE: Caesar Richardson  [address] [facility] [address]        Dear Colleague,    Thank you for referring your patient, Caesar Richardson, to the [facility] BLOOD AND [facility]ARROW TRANSPLANT PROGRAM [place]. Please see a copy of my visit note below.    CLINIC PROGRESS NOTE    I saw and examined Mr. 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  The patient returns today for a regular followup after his recent surgery for severe gastroesophageal reflux.    The patient reports that he is finally doing better in the last week.  The symptoms that he developed after the surgery have now resolved, and he is much more functional.  He feels that he is finally back to where he should be.  He is eating better and does not have heartburn or chest pain.  His mood is excellent, and he continues to be active driving in [place].  He continues to smoke.  He does not abuse alcohol or drugs.  He is somewhat overweight and knows that he has high glucose in the blood, but has not established primary care in [place].  He continues to have occasional bronchitis, but that is not symptomatic today.      The remaining complete review of systems is otherwise negative.    PHYSICAL EXAMINATION:  The patient is alert, oriented and in no acute distress.  He is not pale or jaundiced, and he does not have any palpable adenopathy.  His buccal mucosa shows plates, but no mucosal damage or lichenoid changes.  There is no drainage from the nasopharynx.  His vital signs today show blood pressure of 111/74, temperature of 98.8, heart rate of 90, respirations of 16 and oxygen saturation of 97% on room air.  His weight today is 88 kg with a BMI of 29.5.  His lungs have some rare wheezing in the bases, but otherwise no crackles or rhonchi.  His heart is regular with no gallop, rub or murmur.  His abdomen is moderately obese, soft and nontender.  There is no organomegaly or masses.  He has the scars from the  recent laparoscopic surgery.  Extremities are warm and well perfused with no edema.  The patient has no rashes or fibrotic changes consistent with GVHD.    LABORATORY:  The patient's blood work today shows a total white cell count of 9.8, hemoglobin of 13, platelets of 323,000 and neutrophils of 5.4.  His chemistry panel shows a normal liver function and electrolytes with a mildly elevated alk phos of 179.  His total cholesterol was 178, triglycerides were 119, and HDL cholesterol was 42.  His glucose today was 75.    Mr. 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  He is doing better recovering from a recent surgery.  The notable problem that requires medical attention at this time is the slight drop in his hemoglobin that could be a combination of the hematuria that was detected on a recent UA and the recent surgery as well.  In the face of that, we are going to repeat a UA, and we will order a KUB.  The patient has a long history of smoking and is at risk for bladder cancer.  If persistent hematuria with or without ultrasound changes, we would recommend that he see a urologist for further evaluation.    The patient is also overweight and has had intermittent sugar in the urine.  While his fasting glucose today is normal, I strongly recommend that he establish with a primary care physician in [place] to manage all these metabolic issues, which include being overweight, monitoring his lipids and monitoring his glucose and managing it.  I stressed to him that he is only 40 years old, and he needs to do preventative care now rather than waiting until he is in his 50s-60s.    Mr. Richardson, as usual, was very cheerful and agreeable to the plan.  We will see him back in November or sooner if clinically necessary.    Sebas Leon MD on 7/9/2021 at 4:09 PM        30  minutes spent on the date of the encounter doing chart review, history and exam, documentation and further  activities per the note          Again, thank you for allowing me to participate in the care of your patient.        Sincerely,        Sebas Leon MD

## 2021-07-09 NOTE — NURSING NOTE
Chief Complaint   Patient presents with     Blood Draw     Labs drawn via  by RN. VS taken.     Labs drawn with vpt by rn.  Pt tolerated well.  VS taken.  Pt checked in for next appt.    Fabricio Restrepo RN

## 2021-07-26 DIAGNOSIS — N52.9 ERECTILE DYSFUNCTION, UNSPECIFIED ERECTILE DYSFUNCTION TYPE: ICD-10-CM

## 2021-07-26 DIAGNOSIS — C92.10 CHRONIC MYELOID LEUKEMIA (H): ICD-10-CM

## 2021-07-26 RX ORDER — TADALAFIL 10 MG/1
10 TABLET ORAL DAILY PRN
Qty: 10 TABLET | Refills: 11 | Status: SHIPPED | OUTPATIENT
Start: 2021-07-26 | End: 2021-11-05

## 2021-08-19 ENCOUNTER — TELEPHONE (OUTPATIENT)
Dept: PULMONOLOGY | Facility: CLINIC | Age: 41
End: 2021-08-19

## 2021-08-19 NOTE — TELEPHONE ENCOUNTER
Faxed new CT chest orders to [facility] at [phone]. Called patient to let him know that orders had been faxed and inform us of appointment date as images will need to requested to be mailed.

## 2021-09-03 ENCOUNTER — ANCILLARY PROCEDURE (OUTPATIENT)
Dept: CT IMAGING | Facility: CLINIC | Age: 41
End: 2021-09-03
Attending: INTERNAL MEDICINE
Payer: COMMERCIAL

## 2021-09-03 DIAGNOSIS — B49 FUNGAL INFECTION OF LUNG: ICD-10-CM

## 2021-09-03 PROCEDURE — 71250 CT THORAX DX C-: CPT | Mod: GC | Performed by: RADIOLOGY

## 2021-09-19 ENCOUNTER — HEALTH MAINTENANCE LETTER (OUTPATIENT)
Age: 41
End: 2021-09-19

## 2021-11-05 ENCOUNTER — APPOINTMENT (OUTPATIENT)
Dept: LAB | Facility: CLINIC | Age: 41
End: 2021-11-05
Attending: INTERNAL MEDICINE
Payer: COMMERCIAL

## 2021-11-05 ENCOUNTER — ONCOLOGY VISIT (OUTPATIENT)
Dept: TRANSPLANT | Facility: CLINIC | Age: 41
End: 2021-11-05
Attending: INTERNAL MEDICINE
Payer: COMMERCIAL

## 2021-11-05 DIAGNOSIS — N52.9 ERECTILE DYSFUNCTION, UNSPECIFIED ERECTILE DYSFUNCTION TYPE: ICD-10-CM

## 2021-11-05 DIAGNOSIS — C92.10 CHRONIC MYELOID LEUKEMIA (H): ICD-10-CM

## 2021-11-05 PROCEDURE — G0463 HOSPITAL OUTPT CLINIC VISIT: HCPCS

## 2021-11-05 PROCEDURE — 99213 OFFICE O/P EST LOW 20 MIN: CPT | Performed by: INTERNAL MEDICINE

## 2021-11-05 RX ORDER — TADALAFIL 10 MG/1
20 TABLET ORAL DAILY PRN
Qty: 10 TABLET | Refills: 11 | Status: SHIPPED | OUTPATIENT
Start: 2021-11-05 | End: 2022-03-29

## 2021-11-05 NOTE — PROGRESS NOTES
CLINIC PROGRESS NOTE    I saw and examined Mr. 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  The patient returns today for a regular followup after his recent surgery for severe gastroesophageal reflux.    The patient reports that he has been doing well.  He will be having right ankle surgery for a new pain that developed.  Otherwise he still smokes but the bronchitis is under control.  He has had no cough shortness of breath or fevers.  He had all 3 doses of Covid vaccination.  He has not yet had a flu shot.     The remaining complete review of systems is otherwise negative.    PHYSICAL EXAMINATION:  The patient is alert, oriented and in no acute distress.  He is not pale or jaundiced, and he does not have any palpable adenopathy.  His buccal mucosa shows plates, but no mucosal damage or lichenoid changes.  There is no drainage from the nasopharynx.  His lungs have some rare wheezing in the bases, but otherwise no crackles or rhonchi.  His heart is regular with no gallop, rub or murmur.  His abdomen is moderately obese, soft and nontender.  There is no organomegaly or masses.  He has the scars from the recent laparoscopic surgery.  Extremities are warm and well perfused with no edema.  The patient has no rashes or fibrotic changes consistent with GVHD.    LABORATORY: There was no blood work done today.  He will have it done in [place] and   send it to us.     Assessment and plan:  Mr. Richardson is a 40-year-old gentleman now 16 years after allogeneic transplant for CML, and approximately 9 years after donor lymphocyte infusions.  He has been doing very well.  Is going be having some ankle surgery.  He has no evidence of infectious issues at this time his been very proactive regarding Covid prophylaxis.  I again recommended that he stop smoking.  Mr. Richardson would like to continue at least yearly visits with us in the BMT program.  He will have labs done with his primary care physician,  that he now established in [place].  He will have those results sent to us.  He is also supposed to get all the contact information on his primary care physician, Dr. Cui,      Sebas Leon MD on 7/9/2021 at 4:09 PM        20  minutes spent on the date of the encounter doing chart review, history and exam, documentation and further activities per the note

## 2021-11-05 NOTE — NURSING NOTE
"Oncology Rooming Note    November 5, 2021 11:22 AM   Caesar Richardson is a 41 year old male who presents for:    Chief Complaint   Patient presents with     Oncology Clinic Visit     CML     Initial Vitals: There were no vitals taken for this visit. Estimated body mass index is 29.55 kg/m  as calculated from the following:    Height as of 7/9/21: 1.727 m (5' 7.99\").    Weight as of 7/9/21: 88.1 kg (194 lb 4.8 oz). There is no height or weight on file to calculate BSA.  Data Unavailable Comment: Data Unavailable   No LMP for male patient.  Allergies reviewed: Yes  Medications reviewed: Yes    Medications: Medication refills not needed today.  Pharmacy name entered into [employer]:    [facility]  -[facility], [place] - [place], [place] - [address] [place] [address]    Clinical concerns: none       Deana Pal CMA            "

## 2021-11-05 NOTE — LETTER
11/5/2021         RE: Caesar Richardson  [address] [facility] [address]        Dear Colleague,    Thank you for referring your patient, Caesar Richardson, to the [facility] BLOOD AND MARROW TRANSPLANT PROGRAM [place]. Please see a copy of my visit note below.    CLINIC PROGRESS NOTE    I saw and examined Mr. 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  The patient returns today for a regular followup after his recent surgery for severe gastroesophageal reflux.    The patient reports that he has been doing well.  He will be having right ankle surgery for a new pain that developed.  Otherwise he still smokes but the bronchitis is under control.  He has had no cough shortness of breath or fevers.  He had all 3 doses of Covid vaccination.  He has not yet had a flu shot.     The remaining complete review of systems is otherwise negative.    PHYSICAL EXAMINATION:  The patient is alert, oriented and in no acute distress.  He is not pale or jaundiced, and he does not have any palpable adenopathy.  His buccal mucosa shows plates, but no mucosal damage or lichenoid changes.  There is no drainage from the nasopharynx.  His lungs have some rare wheezing in the bases, but otherwise no crackles or rhonchi.  His heart is regular with no gallop, rub or murmur.  His abdomen is moderately obese, soft and nontender.  There is no organomegaly or masses.  He has the scars from the recent laparoscopic surgery.  Extremities are warm and well perfused with no edema.  The patient has no rashes or fibrotic changes consistent with GVHD.    LABORATORY: There was no blood work done today.  He will have it done in [place] and   send it to us.     Assessment and plan:  Mr. Richardson is a 40-year-old gentleman now 16 years after allogeneic transplant for CML, and approximately 9 years after donor lymphocyte infusions.  He has been doing very well.  Is going be having some ankle surgery.  He has no evidence of  infectious issues at this time his been very proactive regarding Covid prophylaxis.  I again recommended that he stop smoking.  Mr. Richardson would like to continue at least yearly visits with us in the BMT program.  He will have labs done with his primary care physician, that he now established in [place].  He will have those results sent to us.  He is also supposed to get all the contact information on his primary care physician, Dr. Cui,      Sebas Leon MD on 7/9/2021 at 4:09 PM    20  minutes spent on the date of the encounter doing chart review, history and exam, documentation and further activities per the note

## 2022-03-29 DIAGNOSIS — C92.10 CHRONIC MYELOID LEUKEMIA (H): ICD-10-CM

## 2022-03-29 DIAGNOSIS — N52.9 ERECTILE DYSFUNCTION, UNSPECIFIED ERECTILE DYSFUNCTION TYPE: ICD-10-CM

## 2022-03-29 RX ORDER — TADALAFIL 10 MG/1
20 TABLET ORAL DAILY PRN
Qty: 30 TABLET | Refills: 1 | Status: SHIPPED | OUTPATIENT
Start: 2022-03-29

## 2022-05-01 ENCOUNTER — HEALTH MAINTENANCE LETTER (OUTPATIENT)
Age: 42
End: 2022-05-01

## 2022-11-19 NOTE — PROGRESS NOTES
Caesar Richardson is a 40 year old male who is being evaluated via a billable video visit.      Please call phone number:  [phone]    Caesar Richardson is a 40 year old male who is being evaluated via a telephone visit.      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  Severe acid reflux with regurgitation of gastric contents at night, ongoing despite omeprazole 40 mg po BID.        Recommendations:  Meet with thoracic surgery to discuss options-  LYNX  Follow up with Pulmonary Medicine regarding bronchoscopy results and pulmonary issues.    Continue omeprazole 40 mg po BID until otherwise directed.   Follow up with me PRN.               History of Present Illness:   Caesar Richardson is a 40 year old male with a history of  BMT in 2006 and DLI in 2012  for CML developed post transplant fungal pneumonia, more recently is having multiple symptoms consistent with severe GERD.  The most trouble some symptoms include worse horseness, cough, and LA grade C esophagitis while on omeprazole 40 mg daily.  Direct Laryngoscopy showed intra arytenoid pachydermia.  Biopsies showed granulomatous nodules on vocal folds.    The esophagitis did improve with increase in omeprazole to 40 mg po BID.    However he continues to regularly experience acid regurgitation.     1/25/21  EGD   Impression:          - Z-line irregular, 35 cm from the incisors.                        - LA Grade C esophagitis. Biopsied.                        - Erythematous mucosa in the stomach. Biopsied.                        - 1 cm hiatal hernia.                        - Normal examined  duodenum. Biopsied.                        - Dysphagia likely due to esophagitis. Will increase                        omeprazole to 40 mg PO BID and follow symptoms. Repeat                        EGD in 2-3 months to check for healing of esophagitis                        and ensure no Mitchell's.                        - No stricture seen. No need for dilation given                        esophagitis.  3/25/21  EGD  Findings:        The Z-line was irregular.        The lower third of the esophagus was mildly tortuous.        There is no endoscopic evidence of esophagitis in the lower third of the        esophagus.        A small hiatal hernia was present.        The exam of the stomach was otherwise normal.        The examined duodenum was normal.                                             Normal Esophageal Manometry per [place] 4.0 Classification - however he did have some distal weakness consistent with older criteria for IEM.    High Resolution Esophageal Manometry     Impression:   Normal Esophageal Manometry by [place] 4.0 Classification   Some minor degree of distal weakness, bolus escape, likely reflux related ineffectiveness.           Because of the severity of his cough and the previous history of fungal pneumonia, he had follow up CT which showed similar changes to 2014 (during fungal pneumonia) he underwent bronchoscopy last week and met with Dr. Nick in pulmonary medicine.   CT CHEST 4/13/21  IMPRESSION:    1. Scattered areas of tree-in-bud and nodular groundglass opacities in  the bilateral lower lobes, concerning for atypical infection.  2. Borderline mediastinal lymphadenopathy, likely reactive.  3. Overall unchanged apical predominant emphysematous changes.  Per my review there is no obvious hiatal hernia there.       Because of this he underwent bronchoscopy: 4/21/21  Findings:        Vocal cords with normal appearance and movement. Normal trachea, becky        and bilateral mainstem bronchi.  "No endobronchial massess or secretions.        BAL of lateral segment of RLL with 180 ml of saline and return of 35 ml,        BAL of superior segment LLL with 180 ml and return of 45 ml. Returns        from both sites were blood tinged and cloudy.   Impression:           Normal vocal cords, trachea, and mainstem airways. No                         endobronhial massess or secretions. Sucessful BAL of                         lateral segment RLL and superior segment of LLL.     His case was discussed at bi-weekly esophagus med-surg meeting.  He was then scheduled to follow up with Thoracic Surgery to discuss Lynx.    He continues on omeprazole 40 mg BID.   He is waiting to here back from Dr. Nick about his lungs and any treatment that might be necessary.         Past Medical and Surgical History, Social History, Family History - per Epic EMR: REVIEWED         Allergies:   Reviewed and edited as appropriate   No Known Allergies         Medications:     Current Outpatient Medications   Medication Sig Dispense Refill     diclofenac (VOLTAREN) 1 % GEL Apply topically 4 times daily 2 Tube 6     fluticasone-salmeterol (ADVAIR) 250-50 MCG/DOSE diskus inhaler Inhale 1 puff into the lungs 2 times daily 1 Inhaler 12     ibuprofen (ADVIL/MOTRIN) 200 MG tablet Take 200 mg by mouth every 4 hours as needed for mild pain       nicotine (NICODERM CQ) 21 MG/24HR 24 hr patch Place 1 patch onto the skin every 24 hours       omeprazole (PRILOSEC) 40 MG DR capsule Take 1 capsule (40 mg) by mouth 2 times daily (before meals) 60 capsule 3     tadalafil (CIALIS) 10 MG tablet Take 1 tablet (10 mg) by mouth daily as needed (take 1 tablet 30 min prior to sexual intercourse) 10 tablet 11     traZODone (DESYREL) 150 MG tablet Take 1 tablet (150 mg) by mouth At Bedtime 90 tablet 6             Review of Systems:     Per HPI           Physical Exam:   Ht 1.727 m (5' 8\")   Wt 88.5 kg (195 lb)   BMI 29.65 kg/m    Wt:   Wt Readings from Last 2 " Encounters:   04/28/21 88.5 kg (195 lb)   04/21/21 88.3 kg (194 lb 10.7 oz)      Constitutional: cooperative, pleasant, no acute distress hoarse voice        Sascha Gunter MD  Associate Professor of Medicine  Division of Gastroenterology, Hepatology, and Nutrition  [facility]       show

## 2022-11-21 ENCOUNTER — HEALTH MAINTENANCE LETTER (OUTPATIENT)
Age: 42
End: 2022-11-21

## 2023-06-02 ENCOUNTER — HEALTH MAINTENANCE LETTER (OUTPATIENT)
Age: 43
End: 2023-06-02

## 2024-01-04 NOTE — TELEPHONE ENCOUNTER
Clinical   Blood and Marrow Transplant Service    Spoke with LOU about his health insurance and medications. Writer asked if he applied for [place] Medical Assistance and he indicated that he had not - he is going before a SSA  on 6.22.17 and he is hoping to get his SSDI benefits and Medicare reinstated. We talked about the value of having medical insurance - both if he has his benefits reinstated and if he does not. The MA could help with the gap that Medicare does not pay and also be a free standing insurance if he does not have Medicare. He indicated a barrier for him to complete the form is he has difficulty with reading/completing the form. Writer asked if anyone would help him at the [place] office and he said they would not. His SSDI  encouraged him on Monday to go the [place] office and explain that he has difficulty completing forms and to ask for help. He will also ask his Aunt to help him as she has in the past. Writer also indicated that if his Aunt helps him he could mail the document to writeric and we could discuss over the phone on how to ensure we have completed the entire application.     Re: his medications that he has been without for 1 month and a new prescription today (Cleocin cream, Advair and Spiriva) writer will send pharmaceutical company applications to him for all 3 medications. Writer will complete entire form except for MD portion and where LOU would need to sign/date and send to LOU. He will return to writer after signing/dating and we can send in with MD information and prescription. Writer will also speak with Pharmacy at [facility] to see what the cost would be for these medications and if we could do a willy.     LOU was in agreement with the plan above and when he returns home on Friday he will start the process. Writer will mail out the applications to him on Friday 4.14.17.    No other questions or concerns identified at this time. Will continue to follow for supportive  counseling and assist with resources.     Neena GREY LICSW  4/12/2017        show

## 2024-06-22 ENCOUNTER — HEALTH MAINTENANCE LETTER (OUTPATIENT)
Age: 44
End: 2024-06-22

## 2025-02-16 NOTE — ANESTHESIA CARE TRANSFER NOTE
Patient: Caesar Richardson    Procedure(s):  HERNIORRHAPHY, HIATAL, LAPAROSCOPIC, MAGNETIC SPHINCTER  AUGMENTATION    Diagnosis: Esophagitis [K20.90]  Diagnosis Additional Information: No value filed.    Anesthesia Type:   General     Note:    Oropharynx: oral airway in place  Level of Consciousness: drowsy  Oxygen Supplementation: face mask  Level of Supplemental Oxygen (L/min / FiO2): 6  Independent Airway: airway patency satisfactory and stable  Dentition: dentition unchanged  Vital Signs Stable: post-procedure vital signs reviewed and stable  Report to RN Given: handoff report given  Patient transferred to: PACU    Handoff Report: Identifed the Patient, Identified the Reponsible Provider, Reviewed the pertinent medical history, Discussed the surgical course, Reviewed Intra-OP anesthesia mangement and issues during anesthesia, Set expectations for post-procedure period and Allowed opportunity for questions and acknowledgement of understanding      Vitals: (Last set prior to Anesthesia Care Transfer)  CRNA VITALS  6/4/2021 1847 - 6/4/2021 1929      6/4/2021             NIBP:  128/76    Ht Rate:  83        Electronically Signed By: JASON Way CRNA  June 4, 2021  7:29 PM   Attending Attestation (For Attendings USE Only)...

## (undated) DEVICE — SUCTION CATH AIRLIFE TRI-FLO W/CONTROL PORT 14FR  T60C

## (undated) DEVICE — GLOVE PROTEXIS BLUE W/NEU-THERA 7.5  2D73EB75

## (undated) DEVICE — ENDO BITE BLOCK ADULT OMNI-BLOC

## (undated) DEVICE — BLADE CLIPPER SGL USE 9680

## (undated) DEVICE — TRAY PNEUMOTHORAX G12032 C-UTPTY-1400-WAYNE-112497

## (undated) DEVICE — DRAPE EXTREMITY BILAT

## (undated) DEVICE — SUCTION MANIFOLD NEPTUNE 2 SYS 1 PORT 702-025-000

## (undated) DEVICE — SU VICRYL 0 UR-6 27" J603H

## (undated) DEVICE — SU SILK 0 TIE 6X30" A306H

## (undated) DEVICE — SOL NACL 0.9% IRRIG 1000ML BOTTLE 2F7124

## (undated) DEVICE — GLOVE EXAM NITRILE LG PF LATEX FREE 5064

## (undated) DEVICE — BNDG ELASTIC 6"X5YDS STERILE 6611-6S

## (undated) DEVICE — SUCTION MANIFOLD NEPTUNE 2 SYS 4 PORT 0702-020-000

## (undated) DEVICE — SU SILK 0 SH 30" K834H

## (undated) DEVICE — DRAPE CONVERTORS U-DRAPE 60X72" 8476

## (undated) DEVICE — LINEN TOWEL PACK X5 5464

## (undated) DEVICE — LINEN TOWEL PACK X6 WHITE 5487

## (undated) DEVICE — CONNECTOR PERFUSION Y 3/8X3/8" W/O LL 6031

## (undated) DEVICE — PACK ENT ENDOSCOPY UMMC

## (undated) DEVICE — DRAPE IOBAN INCISE 23X17" 6650EZ

## (undated) DEVICE — ANTIFOG SOLUTION W/FOAM PAD 31142527

## (undated) DEVICE — GLOVE PROTEXIS W/NEU-THERA 8.0  2D73TE80

## (undated) DEVICE — SYR 10ML LL W/O NDL 302995

## (undated) DEVICE — SYR 30ML SLIP TIP W/O NDL 302833

## (undated) DEVICE — DRSG ADAPTIC 3X8" 6113

## (undated) DEVICE — TUBING SUCTION 12"X1/4" N612

## (undated) DEVICE — DRSG GAUZE 4X8"

## (undated) DEVICE — GOWN IMPERVIOUS 2XL BLUE

## (undated) DEVICE — PEN MARKING SKIN W/LABELS 31145918

## (undated) DEVICE — Device

## (undated) DEVICE — LINEN BACK PACK 5440

## (undated) DEVICE — SPECIMEN CONTAINER 3OZ W/FORMALIN 59901

## (undated) DEVICE — STRAP KNEE/BODY 31143004

## (undated) DEVICE — TOURNIQUET CUFF 30" REPRO BLUE 60-7070-105

## (undated) DEVICE — NDL 22GA 1.5"

## (undated) DEVICE — SUCTION DRY CHEST DRAIN OASIS 3600-100

## (undated) DEVICE — DRAIN PENROSE 5/8X18" LATEX 0918040

## (undated) DEVICE — SU MONOCRYL 3-0 PS-1 27" Y936H

## (undated) DEVICE — SU MONOCRYL 4-0 PS-2 27" UND Y426H

## (undated) DEVICE — PREP CHLORAPREP 26ML TINTED ORANGE  260815

## (undated) DEVICE — BONE CLEANING TIP INTERPULSE  0210-010-000

## (undated) DEVICE — SOL WATER IRRIG 1000ML BOTTLE 2F7114

## (undated) DEVICE — LINEN GOWN X4 5410

## (undated) DEVICE — JELLY LUBRICATING SURGILUBE 2OZ TUBE

## (undated) DEVICE — DRSG STERI STRIP 1X5" R1548

## (undated) DEVICE — PREP DURAPREP 26ML APL 8630

## (undated) DEVICE — SYR 10ML FINGER CONTROL W/O NDL 309695

## (undated) DEVICE — BNDG ESMARK 6" STERILE 836-3612

## (undated) DEVICE — BLADE SAW SAGITTAL STRK 19.5X90X1.27MM 2108-109-000S11

## (undated) DEVICE — ENDO FORCEP BX CAPTURA PRO SPIKE G50696

## (undated) DEVICE — ESU GROUND PAD UNIVERSAL W/O CORD

## (undated) DEVICE — KIT ENDO TURNOVER/PROCEDURE CARRY-ON 101822

## (undated) DEVICE — GOWN XLG DISP 9545

## (undated) DEVICE — SUCTION IRR SYSTEM W/O TIP INTERPULSE HANDPIECE 0210-100-000

## (undated) DEVICE — SOL NACL 0.9% IRRIG 3000ML BAG 2B7477

## (undated) DEVICE — KIT CONNECTOR FOR OLYMPUS ENDOSCOPES DEFENDO 100310

## (undated) DEVICE — ENDO TOOTH GUARD SAC2001

## (undated) DEVICE — ESU ELEC BLADE 2.75" COATED/INSULATED E1455

## (undated) DEVICE — BLADE KNIFE SURG 10 371110

## (undated) DEVICE — SPONGE COTTONOID 1/2X3" 80-1407

## (undated) DEVICE — SU VICRYL 4-0 PS-2 18" UND J496H

## (undated) DEVICE — TUBING SUCTION 10'X3/16" N510

## (undated) DEVICE — ESU LIGASURE MARYLAND LAPAROSCOPIC SLR/DVDR 5MMX37CM LF1937

## (undated) DEVICE — ENDO SYSTEM WATER BOTTLE & TUBING W/CO2 FILTER 00711549

## (undated) DEVICE — DRAIN CHEST TUBE EXTENDED STR LF 19907

## (undated) DEVICE — ENDO TROCAR FIRST ENTRY KII FIOS Z-THRD 12X100MM CTF73

## (undated) DEVICE — SU VICRYL 2-0 CT-2 27" UND J269H

## (undated) DEVICE — ADH SKIN CLOSURE PREMIERPRO EXOFIN 1.0ML 3470

## (undated) DEVICE — BLADE KNIFE SURG 15 371115

## (undated) DEVICE — DRSG ABDOMINAL 07 1/2X8" 7197D

## (undated) DEVICE — SU MONOCRYL 4-0 PS-2 18" UND Y496G

## (undated) DEVICE — SOL WATER IRRIG 500ML BOTTLE 2F7113

## (undated) DEVICE — SPONGE RAY-TEC 4X8" 7318

## (undated) DEVICE — GLOVE PROTEXIS BLUE W/NEU-THERA 8.0  2D73EB80

## (undated) DEVICE — SU PLEDGET SOFT TFE 13MMX7MMX1.5MM D7044

## (undated) DEVICE — BONE CEMENT MIXEVAC III HI VAC KIT  0206-015-000

## (undated) DEVICE — HOOD FLYTE W/PEELAWAY 408-800-100

## (undated) DEVICE — LINEN GOWN XLG 5407

## (undated) DEVICE — SU SILK 2-0 SH 30" K833H

## (undated) DEVICE — SU VICRYL 1 CT-1 27" UND J261H

## (undated) DEVICE — DRAPE STOCKINETTE IMPERVIOUS 12" 1587

## (undated) DEVICE — CAST PADDING 6" STERILE 9046S

## (undated) DEVICE — DRSG TELFA 3X8" 1238

## (undated) DEVICE — DRSG PRIMAPORE 02X3" 7133

## (undated) DEVICE — BNDG ELASTIC 6" DBL LENGTH UNSTERILE 6611-16

## (undated) DEVICE — SU VICRYL 2-0 CT-1 27" UND J259H

## (undated) DEVICE — SPONGE LAP 18X18" X8435

## (undated) DEVICE — BLADE SAW RECIP STRK 77.5X11X1.23MM 0277-096-326

## (undated) DEVICE — SUCTION MANIFOLD DORNOCH ULTRA CART UL-CL500

## (undated) DEVICE — ENDO TROCAR FIRST ENTRY KII FIOS Z-THRD 05X100MM CTF03

## (undated) DEVICE — BASIN SET MAJOR

## (undated) RX ORDER — FENTANYL CITRATE 50 UG/ML
INJECTION, SOLUTION INTRAMUSCULAR; INTRAVENOUS
Status: DISPENSED
Start: 2021-01-25

## (undated) RX ORDER — ONDANSETRON 2 MG/ML
INJECTION INTRAMUSCULAR; INTRAVENOUS
Status: DISPENSED
Start: 2021-02-23

## (undated) RX ORDER — CEFAZOLIN SODIUM 2 G/100ML
INJECTION, SOLUTION INTRAVENOUS
Status: DISPENSED
Start: 2018-03-01

## (undated) RX ORDER — HYDROMORPHONE HYDROCHLORIDE 1 MG/ML
INJECTION, SOLUTION INTRAMUSCULAR; INTRAVENOUS; SUBCUTANEOUS
Status: DISPENSED
Start: 2021-06-04

## (undated) RX ORDER — FENTANYL CITRATE 50 UG/ML
INJECTION, SOLUTION INTRAMUSCULAR; INTRAVENOUS
Status: DISPENSED
Start: 2018-03-01

## (undated) RX ORDER — LIDOCAINE HYDROCHLORIDE 20 MG/ML
SOLUTION OROPHARYNGEAL
Status: DISPENSED
Start: 2021-04-01

## (undated) RX ORDER — EPHEDRINE SULFATE 50 MG/ML
INJECTION, SOLUTION INTRAMUSCULAR; INTRAVENOUS; SUBCUTANEOUS
Status: DISPENSED
Start: 2021-02-23

## (undated) RX ORDER — FENTANYL CITRATE 50 UG/ML
INJECTION, SOLUTION INTRAMUSCULAR; INTRAVENOUS
Status: DISPENSED
Start: 2021-06-04

## (undated) RX ORDER — PROPOFOL 10 MG/ML
INJECTION, EMULSION INTRAVENOUS
Status: DISPENSED
Start: 2021-06-04

## (undated) RX ORDER — FENTANYL CITRATE 50 UG/ML
INJECTION, SOLUTION INTRAMUSCULAR; INTRAVENOUS
Status: DISPENSED
Start: 2021-02-23

## (undated) RX ORDER — PROPOFOL 10 MG/ML
INJECTION, EMULSION INTRAVENOUS
Status: DISPENSED
Start: 2018-03-01

## (undated) RX ORDER — ONDANSETRON 2 MG/ML
INJECTION INTRAMUSCULAR; INTRAVENOUS
Status: DISPENSED
Start: 2021-06-04

## (undated) RX ORDER — FENTANYL CITRATE 50 UG/ML
INJECTION, SOLUTION INTRAMUSCULAR; INTRAVENOUS
Status: DISPENSED
Start: 2021-03-26

## (undated) RX ORDER — LIDOCAINE HYDROCHLORIDE 10 MG/ML
INJECTION, SOLUTION EPIDURAL; INFILTRATION; INTRACAUDAL; PERINEURAL
Status: DISPENSED
Start: 2021-04-21

## (undated) RX ORDER — LIDOCAINE HYDROCHLORIDE 10 MG/ML
INJECTION, SOLUTION EPIDURAL; INFILTRATION; INTRACAUDAL; PERINEURAL
Status: DISPENSED
Start: 2021-06-04

## (undated) RX ORDER — LIDOCAINE HYDROCHLORIDE 20 MG/ML
INJECTION, SOLUTION EPIDURAL; INFILTRATION; INTRACAUDAL; PERINEURAL
Status: DISPENSED
Start: 2021-02-23

## (undated) RX ORDER — ACETAMINOPHEN 325 MG/1
TABLET ORAL
Status: DISPENSED
Start: 2018-03-01

## (undated) RX ORDER — FENTANYL CITRATE 50 UG/ML
INJECTION, SOLUTION INTRAMUSCULAR; INTRAVENOUS
Status: DISPENSED
Start: 2021-04-21

## (undated) RX ORDER — DIPHENHYDRAMINE HYDROCHLORIDE 50 MG/ML
INJECTION INTRAMUSCULAR; INTRAVENOUS
Status: DISPENSED
Start: 2021-01-25

## (undated) RX ORDER — DEXAMETHASONE SODIUM PHOSPHATE 4 MG/ML
INJECTION, SOLUTION INTRA-ARTICULAR; INTRALESIONAL; INTRAMUSCULAR; INTRAVENOUS; SOFT TISSUE
Status: DISPENSED
Start: 2021-06-04

## (undated) RX ORDER — LIDOCAINE HYDROCHLORIDE 20 MG/ML
INJECTION, SOLUTION EPIDURAL; INFILTRATION; INTRACAUDAL; PERINEURAL
Status: DISPENSED
Start: 2018-03-01

## (undated) RX ORDER — LIDOCAINE HYDROCHLORIDE 20 MG/ML
INJECTION, SOLUTION EPIDURAL; INFILTRATION; INTRACAUDAL; PERINEURAL
Status: DISPENSED
Start: 2021-06-04

## (undated) RX ORDER — FENTANYL CITRATE-0.9 % NACL/PF 10 MCG/ML
PLASTIC BAG, INJECTION (ML) INTRAVENOUS
Status: DISPENSED
Start: 2021-02-23

## (undated) RX ORDER — PROPOFOL 10 MG/ML
INJECTION, EMULSION INTRAVENOUS
Status: DISPENSED
Start: 2021-02-23

## (undated) RX ORDER — BUPIVACAINE HYDROCHLORIDE 2.5 MG/ML
INJECTION, SOLUTION EPIDURAL; INFILTRATION; INTRACAUDAL
Status: DISPENSED
Start: 2021-06-04

## (undated) RX ORDER — TRIAMCINOLONE ACETONIDE 40 MG/ML
INJECTION, SUSPENSION INTRA-ARTICULAR; INTRAMUSCULAR
Status: DISPENSED
Start: 2017-09-14

## (undated) RX ORDER — ONDANSETRON 2 MG/ML
INJECTION INTRAMUSCULAR; INTRAVENOUS
Status: DISPENSED
Start: 2018-03-01

## (undated) RX ORDER — ACETAMINOPHEN 325 MG/1
TABLET ORAL
Status: DISPENSED
Start: 2021-06-04

## (undated) RX ORDER — CEFAZOLIN SODIUM 2 G/100ML
INJECTION, SOLUTION INTRAVENOUS
Status: DISPENSED
Start: 2021-06-04

## (undated) RX ORDER — OXYMETAZOLINE HYDROCHLORIDE 0.05 G/100ML
SPRAY NASAL
Status: DISPENSED
Start: 2021-02-23

## (undated) RX ORDER — DEXAMETHASONE SODIUM PHOSPHATE 4 MG/ML
INJECTION, SOLUTION INTRA-ARTICULAR; INTRALESIONAL; INTRAMUSCULAR; INTRAVENOUS; SOFT TISSUE
Status: DISPENSED
Start: 2018-03-01

## (undated) RX ORDER — PHENYLEPHRINE HCL IN 0.9% NACL 1 MG/10 ML
SYRINGE (ML) INTRAVENOUS
Status: DISPENSED
Start: 2018-03-01

## (undated) RX ORDER — GABAPENTIN 300 MG/1
CAPSULE ORAL
Status: DISPENSED
Start: 2018-03-01

## (undated) RX ORDER — LABETALOL HYDROCHLORIDE 5 MG/ML
INJECTION, SOLUTION INTRAVENOUS
Status: DISPENSED
Start: 2021-06-04

## (undated) RX ORDER — LIDOCAINE HYDROCHLORIDE 20 MG/ML
SOLUTION OROPHARYNGEAL
Status: DISPENSED
Start: 2021-04-21

## (undated) RX ORDER — DEXAMETHASONE SODIUM PHOSPHATE 4 MG/ML
INJECTION, SOLUTION INTRA-ARTICULAR; INTRALESIONAL; INTRAMUSCULAR; INTRAVENOUS; SOFT TISSUE
Status: DISPENSED
Start: 2021-02-23

## (undated) RX ORDER — LIDOCAINE HYDROCHLORIDE AND EPINEPHRINE 10; 10 MG/ML; UG/ML
INJECTION, SOLUTION INFILTRATION; PERINEURAL
Status: DISPENSED
Start: 2021-02-23

## (undated) RX ORDER — EPHEDRINE SULFATE 50 MG/ML
INJECTION, SOLUTION INTRAMUSCULAR; INTRAVENOUS; SUBCUTANEOUS
Status: DISPENSED
Start: 2018-03-01

## (undated) RX ORDER — LIDOCAINE HYDROCHLORIDE 10 MG/ML
INJECTION, SOLUTION INFILTRATION; PERINEURAL
Status: DISPENSED
Start: 2017-09-14